# Patient Record
Sex: FEMALE | Race: WHITE | NOT HISPANIC OR LATINO | Employment: FULL TIME | URBAN - METROPOLITAN AREA
[De-identification: names, ages, dates, MRNs, and addresses within clinical notes are randomized per-mention and may not be internally consistent; named-entity substitution may affect disease eponyms.]

---

## 2015-09-16 LAB
HUMAN PAPILLOMAVIRUS (HPV): NORMAL
PAP SMEAR: NORMAL

## 2018-05-03 ENCOUNTER — OFFICE VISIT (OUTPATIENT)
Dept: CARDIOLOGY | Facility: CLINIC | Age: 28
End: 2018-05-03
Payer: COMMERCIAL

## 2018-05-03 VITALS
HEART RATE: 116 BPM | DIASTOLIC BLOOD PRESSURE: 69 MMHG | BODY MASS INDEX: 21.12 KG/M2 | HEIGHT: 63 IN | SYSTOLIC BLOOD PRESSURE: 119 MMHG | WEIGHT: 119.19 LBS

## 2018-05-03 DIAGNOSIS — R00.0 SINUS TACHYCARDIA: Primary | ICD-10-CM

## 2018-05-03 PROCEDURE — 99202 OFFICE O/P NEW SF 15 MIN: CPT | Mod: S$GLB,,, | Performed by: INTERNAL MEDICINE

## 2018-05-03 PROCEDURE — 93000 ELECTROCARDIOGRAM COMPLETE: CPT | Mod: S$GLB,,, | Performed by: INTERNAL MEDICINE

## 2018-05-03 PROCEDURE — 3008F BODY MASS INDEX DOCD: CPT | Mod: CPTII,S$GLB,, | Performed by: INTERNAL MEDICINE

## 2018-05-03 PROCEDURE — 99999 PR PBB SHADOW E&M-NEW PATIENT-LVL III: CPT | Mod: PBBFAC,,, | Performed by: INTERNAL MEDICINE

## 2018-05-03 RX ORDER — CYCLOBENZAPRINE HCL 10 MG
10 TABLET ORAL NIGHTLY
COMMUNITY
Start: 2018-04-24 | End: 2020-11-11

## 2018-05-03 RX ORDER — MELOXICAM 15 MG/1
15 TABLET ORAL DAILY PRN
COMMUNITY
End: 2020-11-11

## 2018-05-03 RX ORDER — MELATONIN 10 MG
CAPSULE ORAL
COMMUNITY
End: 2020-11-11

## 2018-05-03 NOTE — PROGRESS NOTES
"  Subjective:      Patient ID: Cheryl Peter is a 27 y.o. female.    Chief Complaint: Palpitations and Tachycardia    HPI: "I can feel a flutter in my chest and my heart pounding intermittently for the past month.  Pt is aware of palpitations mostly when sitting.  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  No exercise per se. Pt performs housework without difficulty.  After blowdrying hair last Saturday pt arose and felt weak with blurry vision transiently.  BP was 114/68 with heart rate 130 bpm.    Pt takes cyclobenzaprine for fibromyalgia.  Review of Systems   Cardiovascular: Positive for palpitations. Negative for chest pain, claudication, dyspnea on exertion, irregular heartbeat, leg swelling, near-syncope, orthopnea and syncope.        No past medical history on file.     Past Surgical History:   Procedure Laterality Date    ankle arthroscopy      DILATION AND CURETTAGE OF UTERUS      TONSILLECTOMY         Family History   Problem Relation Age of Onset    Diabetes Mother     Hyperlipidemia Mother     Heart disease Father     Hyperlipidemia Father     Diabetes Father        Social History     Social History    Marital status:      Spouse name: N/A    Number of children: N/A    Years of education: N/A     Social History Main Topics    Smoking status: Never Smoker    Smokeless tobacco: Never Used    Alcohol use No    Drug use: Unknown    Sexual activity: Not Asked     Other Topics Concern    None     Social History Narrative    None       No current outpatient prescriptions on file prior to visit.     No current facility-administered medications on file prior to visit.        Review of patient's allergies indicates:   Allergen Reactions    Penicillins Hives and Rash     Objective:     Vitals:    05/03/18 1605   BP: 119/69   Pulse: (!) 116   Weight: 54.1 kg (119 lb 3.2 oz)   Height: 5' 3" (1.6 m)        Physical Exam   Constitutional: She is oriented to person, " place, and time. She appears well-developed and well-nourished.   Eyes: No scleral icterus.   Neck: No JVD present. Carotid bruit is not present.   Cardiovascular: Normal rate and regular rhythm.  Exam reveals no gallop.    Murmur (I/VI systolic) heard.  Pulses:       Dorsalis pedis pulses are 2+ on the right side, and 2+ on the left side.   Pulmonary/Chest: Breath sounds normal.   Abdominal: Soft. There is no hepatosplenomegaly. There is no tenderness.   Musculoskeletal: She exhibits no edema.   Neurological: She is alert and oriented to person, place, and time.   Skin: Skin is warm and dry.   Psychiatric: She has a normal mood and affect. Her behavior is normal. Judgment and thought content normal.   Vitals reviewed.     ECG: sinus tachycardia, nonspecific ST segment and T wave abnormalities.    Log of heart rate and blood pressures reviewed from home  Note normal Hgb in January 2018  Note normal Tsh in December last year    Note BMP was normal last year      Assessment:     1. Sinus tachycardia      Plan:   Cheryl was seen today for palpitations and tachycardia.    Diagnoses and all orders for this visit:    Sinus tachycardia  -     2D echo with color flow doppler; Future  -     TSH; Future  -     CBC auto differential; Future  -     Lipid panel; Future  -     Comprehensive metabolic panel; Future  -     T4, free; Future  -     Holter monitor - 24 hour; Future  -     EKG 12-lead     I suspect that the sinus tachycardia is a side effect of cyclobenzaprine.     Will empircally reduce the dose to 10 mg q hs and pt will discuss with her rheumatologist alternative drugs.    Discussed usual w/u for sinus tachycardia:  TFT's   Echocardiogram  24 hour holter monitor   Repeat CBC  CMP  Lipids due to FH of hyperlipidemia    Pt will proceed with w/u if palpitations persist even after weaning the cyclobenzaprine    Follow-up in about 4 weeks (around 5/31/2018).

## 2018-09-21 ENCOUNTER — LAB VISIT (OUTPATIENT)
Dept: LAB | Facility: HOSPITAL | Age: 28
End: 2018-09-21
Attending: INTERNAL MEDICINE
Payer: COMMERCIAL

## 2018-09-21 DIAGNOSIS — D68.9 COAGULATION DISORDER: ICD-10-CM

## 2018-09-21 LAB
ALBUMIN SERPL BCP-MCNC: 4.9 G/DL
ALP SERPL-CCNC: 36 U/L
ALT SERPL W/O P-5'-P-CCNC: 25 U/L
ANION GAP SERPL CALC-SCNC: 12 MMOL/L
APTT PPP: 30.7 SEC
AST SERPL-CCNC: 23 U/L
BASOPHILS # BLD AUTO: 0.03 K/UL
BASOPHILS NFR BLD: 0.3 %
BILIRUB SERPL-MCNC: 0.4 MG/DL
BUN SERPL-MCNC: 12 MG/DL
CALCIUM SERPL-MCNC: 10.3 MG/DL
CHLORIDE SERPL-SCNC: 102 MMOL/L
CO2 SERPL-SCNC: 24 MMOL/L
CREAT SERPL-MCNC: 0.62 MG/DL
DIFFERENTIAL METHOD: ABNORMAL
EOSINOPHIL # BLD AUTO: 0 K/UL
EOSINOPHIL NFR BLD: 0.4 %
ERYTHROCYTE [DISTWIDTH] IN BLOOD BY AUTOMATED COUNT: 11.9 %
EST. GFR  (AFRICAN AMERICAN): >60 ML/MIN/1.73 M^2
EST. GFR  (NON AFRICAN AMERICAN): >60 ML/MIN/1.73 M^2
GLUCOSE SERPL-MCNC: 96 MG/DL
HCT VFR BLD AUTO: 41.8 %
HGB BLD-MCNC: 14 G/DL
INR PPP: 1
LYMPHOCYTES # BLD AUTO: 2.9 K/UL
LYMPHOCYTES NFR BLD: 33 %
MCH RBC QN AUTO: 31.3 PG
MCHC RBC AUTO-ENTMCNC: 33.5 G/DL
MCV RBC AUTO: 93 FL
MONOCYTES # BLD AUTO: 0.5 K/UL
MONOCYTES NFR BLD: 5.4 %
NEUTROPHILS # BLD AUTO: 5.4 K/UL
NEUTROPHILS NFR BLD: 60.9 %
NRBC BLD-RTO: 0 /100 WBC
PLATELET # BLD AUTO: 323 K/UL
PMV BLD AUTO: 8.7 FL
POTASSIUM SERPL-SCNC: 4.4 MMOL/L
PROT SERPL-MCNC: 8.2 G/DL
PROTHROMBIN TIME: 12.9 SEC
RBC # BLD AUTO: 4.48 M/UL
SODIUM SERPL-SCNC: 138 MMOL/L
WBC # BLD AUTO: 8.92 K/UL

## 2018-09-21 PROCEDURE — 80053 COMPREHEN METABOLIC PANEL: CPT

## 2018-09-21 PROCEDURE — 85610 PROTHROMBIN TIME: CPT

## 2018-09-21 PROCEDURE — 36415 COLL VENOUS BLD VENIPUNCTURE: CPT | Mod: PN

## 2018-09-21 PROCEDURE — 85610 PROTHROMBIN TIME: CPT | Mod: PN

## 2018-09-21 PROCEDURE — 80053 COMPREHEN METABOLIC PANEL: CPT | Mod: PN

## 2018-09-21 PROCEDURE — 85730 THROMBOPLASTIN TIME PARTIAL: CPT | Mod: PN

## 2018-09-21 PROCEDURE — 85025 COMPLETE CBC W/AUTO DIFF WBC: CPT

## 2018-09-21 PROCEDURE — 85025 COMPLETE CBC W/AUTO DIFF WBC: CPT | Mod: PN

## 2018-09-21 PROCEDURE — 85730 THROMBOPLASTIN TIME PARTIAL: CPT

## 2018-09-21 PROCEDURE — 86146 BETA-2 GLYCOPROTEIN ANTIBODY: CPT | Mod: 59

## 2018-09-27 LAB
B2 GLYCOPROT1 IGA SER QL: <9 SAU
B2 GLYCOPROT1 IGG SER QL: <9 SGU
B2 GLYCOPROT1 IGM SER QL: <9 SMU

## 2019-02-04 ENCOUNTER — TELEPHONE (OUTPATIENT)
Dept: OTOLARYNGOLOGY | Facility: CLINIC | Age: 29
End: 2019-02-04

## 2019-02-07 DIAGNOSIS — R42 DIZZY: Primary | ICD-10-CM

## 2019-02-07 NOTE — TELEPHONE ENCOUNTER
Pt reported vertigo and tinnitus.  Added BPPV eval after Audiogram and ENT visit tomorrow. Advised no Meclizine for 24 hours.  Pt's last dose was yesterday. Understanding voiced.

## 2019-02-08 ENCOUNTER — CLINICAL SUPPORT (OUTPATIENT)
Dept: AUDIOLOGY | Facility: CLINIC | Age: 29
End: 2019-02-08
Payer: COMMERCIAL

## 2019-02-08 ENCOUNTER — OFFICE VISIT (OUTPATIENT)
Dept: OTOLARYNGOLOGY | Facility: CLINIC | Age: 29
End: 2019-02-08
Payer: COMMERCIAL

## 2019-02-08 VITALS
SYSTOLIC BLOOD PRESSURE: 119 MMHG | BODY MASS INDEX: 21.54 KG/M2 | DIASTOLIC BLOOD PRESSURE: 69 MMHG | WEIGHT: 117.06 LBS | HEIGHT: 62 IN | HEART RATE: 82 BPM

## 2019-02-08 DIAGNOSIS — H81.11 BPPV (BENIGN PAROXYSMAL POSITIONAL VERTIGO), RIGHT: Primary | ICD-10-CM

## 2019-02-08 DIAGNOSIS — H81.01 ENDOLYMPHATIC HYDROPS OF RIGHT EAR: Primary | ICD-10-CM

## 2019-02-08 DIAGNOSIS — H90.41 SENSORINEURAL HEARING LOSS (SNHL) OF RIGHT EAR WITH UNRESTRICTED HEARING OF LEFT EAR: Primary | ICD-10-CM

## 2019-02-08 DIAGNOSIS — H93.11 TINNITUS, RIGHT: ICD-10-CM

## 2019-02-08 PROCEDURE — 92541 SPONTANEOUS NYSTAGMUS TEST: CPT | Mod: S$GLB,,, | Performed by: AUDIOLOGIST

## 2019-02-08 PROCEDURE — 99203 PR OFFICE/OUTPT VISIT, NEW, LEVL III, 30-44 MIN: ICD-10-PCS | Mod: S$GLB,,, | Performed by: NURSE PRACTITIONER

## 2019-02-08 PROCEDURE — 92567 TYMPANOMETRY: CPT | Mod: S$GLB,,, | Performed by: AUDIOLOGIST-HEARING AID FITTER

## 2019-02-08 PROCEDURE — 3008F PR BODY MASS INDEX (BMI) DOCUMENTED: ICD-10-PCS | Mod: CPTII,S$GLB,, | Performed by: NURSE PRACTITIONER

## 2019-02-08 PROCEDURE — 99999 PR PBB SHADOW E&M-EST. PATIENT-LVL III: ICD-10-PCS | Mod: PBBFAC,,, | Performed by: NURSE PRACTITIONER

## 2019-02-08 PROCEDURE — 3008F BODY MASS INDEX DOCD: CPT | Mod: CPTII,S$GLB,, | Performed by: NURSE PRACTITIONER

## 2019-02-08 PROCEDURE — 92557 PR COMPREHENSIVE HEARING TEST: ICD-10-PCS | Mod: S$GLB,,, | Performed by: AUDIOLOGIST-HEARING AID FITTER

## 2019-02-08 PROCEDURE — 99999 PR PBB SHADOW E&M-EST. PATIENT-LVL I: CPT | Mod: PBBFAC,,,

## 2019-02-08 PROCEDURE — 92541 PR SPONTANEOUS NYSTAGMUS TEST: ICD-10-PCS | Mod: S$GLB,,, | Performed by: AUDIOLOGIST

## 2019-02-08 PROCEDURE — 99203 OFFICE O/P NEW LOW 30 MIN: CPT | Mod: S$GLB,,, | Performed by: NURSE PRACTITIONER

## 2019-02-08 PROCEDURE — 92542 PR POSITIONAL NYSTAGMUS TEST: ICD-10-PCS | Mod: 59,S$GLB,, | Performed by: AUDIOLOGIST

## 2019-02-08 PROCEDURE — 92557 COMPREHENSIVE HEARING TEST: CPT | Mod: S$GLB,,, | Performed by: AUDIOLOGIST-HEARING AID FITTER

## 2019-02-08 PROCEDURE — 99999 PR PBB SHADOW E&M-EST. PATIENT-LVL I: ICD-10-PCS | Mod: PBBFAC,,,

## 2019-02-08 PROCEDURE — 99999 PR PBB SHADOW E&M-EST. PATIENT-LVL III: CPT | Mod: PBBFAC,,, | Performed by: NURSE PRACTITIONER

## 2019-02-08 PROCEDURE — 92567 PR TYMPA2METRY: ICD-10-PCS | Mod: S$GLB,,, | Performed by: AUDIOLOGIST-HEARING AID FITTER

## 2019-02-08 PROCEDURE — 92542 POSITIONAL NYSTAGMUS TEST: CPT | Mod: 59,S$GLB,, | Performed by: AUDIOLOGIST

## 2019-02-08 RX ORDER — TRIAMTERENE AND HYDROCHLOROTHIAZIDE 37.5; 25 MG/1; MG/1
1 CAPSULE ORAL EVERY MORNING
Qty: 30 CAPSULE | Refills: 2 | Status: SHIPPED | OUTPATIENT
Start: 2019-02-08 | End: 2019-02-08

## 2019-02-08 RX ORDER — METHYLPREDNISOLONE 4 MG/1
TABLET ORAL
Qty: 21 TABLET | Refills: 0 | Status: SHIPPED | OUTPATIENT
Start: 2019-02-08 | End: 2019-02-08

## 2019-02-08 RX ORDER — PROGESTERONE 200 MG/1
CAPSULE ORAL
Refills: 5 | COMMUNITY
Start: 2018-12-04 | End: 2020-11-11

## 2019-02-08 NOTE — PROGRESS NOTES
Cheryl Peter was seen 02/08/2019 for an audiological evaluation. Patient complains of dizziness. Pt reports dizziness began Saturday morning following a concert the previous night. She also endorses muffled hearing, aural fullness and loud tinnitus in her right ear as well. She became nauseous and vomited. She went to the ER where she was given Meclizine.  Her tinnitus has since changed to roaring and the dizziness has changed to more lightheadedness rather than spinning. She is being evaluated for dizziness but Lakisha Parker     Results reveal normal hearing from 250-8000Hz bilaterally with the exception of a mild SNHL at 8K Hz for the right ear.    Speech Reception Thresholds were  15 dBHL for the right ear and 15 dBHL for the left ear.    Word recognition scores were excellent bilaterally.   Tympanograms were Type A for the right ear and Type A for the left ear.    Audiogram results were reviewed in detail with patient and all questions were answered. Results will be reviewed by ENT at the completion of this note. Recommend further balance eval for the lightheadedness, repeat hearing test if problems arise and hearing protection in loud noise.

## 2019-02-08 NOTE — PROGRESS NOTES
Cheryl Peter was seen 2/8/19 for a BPPV evaluation.    Patient reported that she went to a concert on Friday and did consume some alcohol.  She woke up Saturday morning with vertigo, along with aural fullness, muffled hearing and tinnitus in her right ear.  She began to have nausea and vomiting as the day progressed.  Her dizziness occurred whenever she tried to get up but improved with laying down.  Her vertigo continued from Saturday through Tuesday and then gradually progressed to more of a lightheaded feeling as opposed to a true spinning. Her tinnitus began to change       VAT was negative right and negative left   Gaze nystagmus was absent  Spontaneous nystagmus absent  Head Right Positional was negative but yielded 2 d/s RB nystagmus   Head Left Positional was negative but yielded 4 d/s LB nystagmus   Hallpike Right was positive for BPPV - 5 d/s RB nystagmus with 5 d/s DB nystagmus; torsional with fixation  Hallpike Left was negative for BPPV but yielded 4 d/s LB nystagmus     A 5-position Epley maneuver was performed for the right ear.  Patient tolerated the maneuver well and was asymptomatic upon discharge.  Post maneuver instructions were given to the patient both verbally and written.  Understanding was voiced.    A follow-up appointment (Lydia) has been scheduled for one week to ensure that BPPV has resolved.    Diagnosis:  PSCC BPPV right, likely secondary to other inner ear pathology given additional aural symptoms that began at the same time    Will possibly need VNG once BPPV is cleared.

## 2019-02-08 NOTE — PROGRESS NOTES
Subjective:       Patient ID: Cheryl Peter is a 28 y.o. female.    Chief Complaint: No chief complaint on file.    HPI   Patient had some alcohol with dinner Friday night (2/1/19). Woke up Saturday (2/2/19) with right ear fullness, right loud ringing tinnitus, right-sided hearing loss, then developed non-positional vertigo that gradually worsened throughout the morning. She had emesis X 2 from vertigo; went to Urgent Care where she was given meclizine. She stayed in bed Saturday through Tuesday (2/5/19), because any movement was not tolerated due to vertigo. Wednesday she felt slightly better with transition from spinning to lightheaded, able to move around for the first time since it started, then yesterday and today just a little lightheaded.   No family h/o Meniere's. She had a beer and a radha Friday night, ate mexican so likely high sodium intake. States she is probably frequently dehydrated.   She and her  are trying to conceive so she is leery to start any new medication at this time (she has experienced three miscarriages).     Review of Systems    Objective:      Physical Exam   Constitutional: She is oriented to person, place, and time. Vital signs are normal. She appears well-developed and well-nourished. She is cooperative. She does not appear ill. No distress.   HENT:   Head: Normocephalic and atraumatic.   Right Ear: Hearing, tympanic membrane, external ear and ear canal normal. Tympanic membrane is not erythematous. No middle ear effusion.   Left Ear: Hearing, tympanic membrane, external ear and ear canal normal. Tympanic membrane is not erythematous.  No middle ear effusion.   Nose: Nose normal. No mucosal edema or rhinorrhea. Right sinus exhibits no maxillary sinus tenderness and no frontal sinus tenderness. Left sinus exhibits no maxillary sinus tenderness and no frontal sinus tenderness.   Mouth/Throat: Uvula is midline, oropharynx is clear and moist and mucous membranes are  normal. Mucous membranes are not pale, not dry and not cyanotic. No oral lesions. No oropharyngeal exudate, posterior oropharyngeal edema or posterior oropharyngeal erythema.   Eyes: Conjunctivae, EOM and lids are normal. Pupils are equal, round, and reactive to light. Right eye exhibits no discharge. Left eye exhibits no discharge. No scleral icterus.   Neck: Trachea normal and normal range of motion. Neck supple. No tracheal deviation present. No thyroid mass and no thyromegaly present.   Cardiovascular: Normal rate.   Pulmonary/Chest: Effort normal. No stridor. No respiratory distress. She has no wheezes.   Musculoskeletal: Normal range of motion.   Lymphadenopathy:        Head (right side): No submental, no submandibular, no tonsillar, no preauricular and no posterior auricular adenopathy present.        Head (left side): No submental, no submandibular, no tonsillar, no preauricular and no posterior auricular adenopathy present.     She has no cervical adenopathy.        Right cervical: No superficial cervical and no posterior cervical adenopathy present.       Left cervical: No superficial cervical and no posterior cervical adenopathy present.   Neurological: She is alert and oriented to person, place, and time. She has normal strength. Coordination and gait normal.   Skin: Skin is warm, dry and intact. No lesion and no rash noted. She is not diaphoretic. No cyanosis. No pallor.   Psychiatric: She has a normal mood and affect. Her speech is normal and behavior is normal. Judgment and thought content normal. Cognition and memory are normal.   Nursing note and vitals reviewed.       Assessment:     She reports all four components consistent with Meniere's: right aural fullness, right tinnitus, right hearing loss, and non-positional vertigo.   Audiogram today is not clearly indicative of Meniere's (no significant low-frequency asymmetry); however her symptoms have significantly improved over the last three days.  It would have been interesting to see what her audiogram may have looked like 5-6 days ago.      Plan:     Patient is trying to conceive. Dyazide is pregnancy category C. So is methylprednisolone. Her symptoms have significantly improved over the past 3 days, so she is inclined to wait and see if improvement continues. If not, and her upcoming pregnancy test next week is negative, she may choose to start Dyazide for three weeks.     She is encouraged to call me and let me know what she decides to do.

## 2019-02-11 ENCOUNTER — PATIENT MESSAGE (OUTPATIENT)
Dept: OTOLARYNGOLOGY | Facility: CLINIC | Age: 29
End: 2019-02-11

## 2019-02-11 DIAGNOSIS — R42 DIZZY: Primary | ICD-10-CM

## 2019-02-15 ENCOUNTER — CLINICAL SUPPORT (OUTPATIENT)
Dept: AUDIOLOGY | Facility: CLINIC | Age: 29
End: 2019-02-15
Payer: COMMERCIAL

## 2019-02-15 DIAGNOSIS — H81.11 BPPV (BENIGN PAROXYSMAL POSITIONAL VERTIGO), RIGHT: Primary | ICD-10-CM

## 2019-02-15 PROCEDURE — 92542 PR POSITIONAL NYSTAGMUS TEST: ICD-10-PCS | Mod: S$GLB,,, | Performed by: AUDIOLOGIST

## 2019-02-15 PROCEDURE — 92542 POSITIONAL NYSTAGMUS TEST: CPT | Mod: S$GLB,,, | Performed by: AUDIOLOGIST

## 2019-02-15 NOTE — PROGRESS NOTES
Cheryl Peter was seen 2/15/19 for a BPPV evaluation.     Patient reported no vertigo for several days.  She has been able to move in ways that previously triggered her vertigo.  She stated that her right ear is starting to feel better (decrease in degree of aural fullness).  She is not taking any meds for this because she recently found out she is pregnant.       VAT was negative right and negative left   Head Right Positional was negative  Head Left Positional was negative for BPPV but yielded 3 d/s RB nystagmus (clinically insignificant with pt reporting no vertigo)  Hallpike Right was negative for torsional nystagmus indicative of typical BPPV but yielded 5 d/s DB nystagmus with some vertigo reported  Hallpike Left was negative for BPPV but yielded 3 d/s RB nystagmus (clinically insignificant with pt reporting no vertigo)    A 5-position Epley maneuver was performed for the right ear.  Patient tolerated the maneuver well and was asymptomatic upon discharge.  Post maneuver instructions were given to the patient both verbally and written.  Understanding was voiced.    Pt will continue with movement restrictions through the weekend and call if her symptoms return as she begins to return to normal activities next week.     Impression: BPPV AD almost fully resolved

## 2020-06-05 ENCOUNTER — OFFICE VISIT (OUTPATIENT)
Dept: PODIATRY | Facility: CLINIC | Age: 30
End: 2020-06-05
Payer: COMMERCIAL

## 2020-06-05 VITALS
WEIGHT: 117 LBS | HEIGHT: 62 IN | BODY MASS INDEX: 21.53 KG/M2 | DIASTOLIC BLOOD PRESSURE: 65 MMHG | HEART RATE: 91 BPM | SYSTOLIC BLOOD PRESSURE: 106 MMHG

## 2020-06-05 DIAGNOSIS — L60.3 NAIL DYSTROPHY: Primary | ICD-10-CM

## 2020-06-05 PROCEDURE — 99203 PR OFFICE/OUTPT VISIT, NEW, LEVL III, 30-44 MIN: ICD-10-PCS | Mod: S$GLB,,, | Performed by: PODIATRIST

## 2020-06-05 PROCEDURE — 88312 PR  SPECIAL STAINS,GROUP I: ICD-10-PCS | Mod: 26,,, | Performed by: PATHOLOGY

## 2020-06-05 PROCEDURE — 99203 OFFICE O/P NEW LOW 30 MIN: CPT | Mod: S$GLB,,, | Performed by: PODIATRIST

## 2020-06-05 PROCEDURE — 88312 SPECIAL STAINS GROUP 1: CPT | Mod: 26,,, | Performed by: PATHOLOGY

## 2020-06-05 PROCEDURE — 88302 TISSUE EXAM BY PATHOLOGIST: CPT | Mod: 26,,, | Performed by: PATHOLOGY

## 2020-06-05 PROCEDURE — 99999 PR PBB SHADOW E&M-EST. PATIENT-LVL III: CPT | Mod: PBBFAC,,, | Performed by: PODIATRIST

## 2020-06-05 PROCEDURE — 88312 SPECIAL STAINS GROUP 1: CPT | Performed by: PATHOLOGY

## 2020-06-05 PROCEDURE — 3008F BODY MASS INDEX DOCD: CPT | Mod: CPTII,S$GLB,, | Performed by: PODIATRIST

## 2020-06-05 PROCEDURE — 88302 PR  SURG PATH,LEVEL II: ICD-10-PCS | Mod: 26,,, | Performed by: PATHOLOGY

## 2020-06-05 PROCEDURE — 88302 TISSUE EXAM BY PATHOLOGIST: CPT | Performed by: PATHOLOGY

## 2020-06-05 PROCEDURE — 3008F PR BODY MASS INDEX (BMI) DOCUMENTED: ICD-10-PCS | Mod: CPTII,S$GLB,, | Performed by: PODIATRIST

## 2020-06-05 PROCEDURE — 99999 PR PBB SHADOW E&M-EST. PATIENT-LVL III: ICD-10-PCS | Mod: PBBFAC,,, | Performed by: PODIATRIST

## 2020-06-05 NOTE — PROGRESS NOTES
Subjective:      Patient ID: Cheryl Peter is a 29 y.o. female.    Chief Complaint: Nail Problem (assessment for fungus 2nd lesser toe )  Patient presents to clinic with the chief complaint of a fungal infection of the Lt. 2nd toe that has been present for the past year.  Relates first noticing nail changes while pregnant which failed to resolve thereafter.  Notes having difficulty trimming the nail and is concerned by its unsightly appearance.  Has attempted to self treat with application of topical OTC antifungals.  States these failed to improve her issue.  Inquires as to additional treatment options.  Denies any additional pedal complaints.  No past medical history on file.    Past Surgical History:   Procedure Laterality Date    ankle arthroscopy      DILATION AND CURETTAGE OF UTERUS      TONSILLECTOMY         Family History   Problem Relation Age of Onset    Diabetes Mother     Hyperlipidemia Mother     Heart disease Father     Hyperlipidemia Father     Diabetes Father        Social History     Socioeconomic History    Marital status:      Spouse name: Not on file    Number of children: Not on file    Years of education: Not on file    Highest education level: Not on file   Occupational History    Not on file   Social Needs    Financial resource strain: Not on file    Food insecurity:     Worry: Not on file     Inability: Not on file    Transportation needs:     Medical: Not on file     Non-medical: Not on file   Tobacco Use    Smoking status: Never Smoker    Smokeless tobacco: Never Used   Substance and Sexual Activity    Alcohol use: No    Drug use: Not on file    Sexual activity: Not on file   Lifestyle    Physical activity:     Days per week: Not on file     Minutes per session: Not on file    Stress: Not on file   Relationships    Social connections:     Talks on phone: Not on file     Gets together: Not on file     Attends Zoroastrianism service: Not on file     Active  member of club or organization: Not on file     Attends meetings of clubs or organizations: Not on file     Relationship status: Not on file   Other Topics Concern    Not on file   Social History Narrative    Not on file       Current Outpatient Medications   Medication Sig Dispense Refill    cyclobenzaprine (FLEXERIL) 10 MG tablet Take 10 mg by mouth every evening.      diphenhydramine HCl (UNISOM SLEEPGELS ORAL) Take by mouth.      melatonin 10 mg Cap Take by mouth.      meloxicam (MOBIC) 15 MG tablet Take 15 mg by mouth daily as needed for Pain.      PNV 79-iron-folic-lmfolate-dha 27 mg-400 mcg- 1.13 mg-250 mg Cap Take 1 capsule by mouth once daily. 90 capsule 6    progesterone (PROMETRIUM) 200 MG capsule PLACE 1 CAPSULE IN VAGINA AT BEDTIME AS DIRECTED  5    promethazine (PHENERGAN) 25 MG tablet Take 25 mg by mouth every 4 (four) hours.       No current facility-administered medications for this visit.        Review of patient's allergies indicates:   Allergen Reactions    Penicillins Hives and Rash       Review of Systems   Constitution: Negative for chills and fever.   Cardiovascular: Negative for claudication and leg swelling.   Skin: Positive for color change and nail changes.   Musculoskeletal: Negative for joint pain, joint swelling, muscle cramps, muscle weakness and myalgias.   Gastrointestinal: Negative for nausea and vomiting.   Psychiatric/Behavioral: Negative for altered mental status.           Objective:      Physical Exam   Constitutional: She is oriented to person, place, and time. She appears well-developed and well-nourished. No distress.   Cardiovascular:   Pulses:       Dorsalis pedis pulses are 2+ on the right side, and 2+ on the left side.        Posterior tibial pulses are 2+ on the right side, and 2+ on the left side.   CFT is < 3 seconds bilateral.  Pedal hair growth is present bilateral.  No lower extremity edema noted bilateral.  Toes are warm to touch bilateral.      Musculoskeletal: She exhibits no edema or tenderness.   Muscle strength 5/5 in all muscle groups bilateral.  No tenderness nor crepitation with ROM of foot/ankle joints bilateral.  No tenderness with palpation of bilateral foot and ankle.  Bilateral pes planus foot type.   Neurological: She is alert and oriented to person, place, and time. She has normal strength. No sensory deficit.   Light touch intact bilateral.   Skin: Skin is warm, dry and intact. Capillary refill takes 2 to 3 seconds. No abrasion, no bruising, no burn, no ecchymosis, no laceration, no lesion and no rash noted. She is not diaphoretic. No erythema. No pallor.   Pedal skin has normal turgor, temperature, and texture bilateral.  Remaining toenails x 9 appear normotrophic.  The Lt. 2nd toenail appears thickened, dystrophic, and shows evidence of subungual debris.  Examination of the skin reveals no evidence of significant maceration, rashes, open lesions, suspicious appearing nevi or other concerning lesions.              Assessment:       Encounter Diagnosis   Name Primary?    Nail dystrophy Yes         Plan:       Cheryl was seen today for nail problem.    Diagnoses and all orders for this visit:    Nail dystrophy  -     Specimen to Pathology Other      I counseled the patient on her conditions, their implications and medical management.    With the patient's verbal consent, a sterile nail nipper was used to trim a portion of the Lt. 2nd toenail without incident.  This was well tolerated by the patient.  The specimen was sent to Pathology for assessment of possible fungal infection.    Discussed with patient a variety of treatment options to address onychomycosis including Maurice Vaporub, topical/oral antifungals, and laser therapy.    Advised to regularly clean shoe gear and shower surfaces to limit exposure.    Advised to be wary of walking barefoot on carpeted surfaces at gyms and hotel rooms.  Also, avoid barefoot walking at public showers  and pool areas.    RTC prn.    Fazal Haley DPM

## 2020-06-11 LAB
FINAL PATHOLOGIC DIAGNOSIS: NORMAL
GROSS: NORMAL

## 2020-06-12 ENCOUNTER — TELEPHONE (OUTPATIENT)
Dept: PODIATRY | Facility: CLINIC | Age: 30
End: 2020-06-12

## 2020-06-12 NOTE — TELEPHONE ENCOUNTER
----- Message from Siomara Rosenbaum LPN sent at 6/12/2020 11:05 AM CDT -----  2nd attempt called no answer. Could not leave message

## 2020-08-18 DIAGNOSIS — M25.512 LEFT SHOULDER PAIN, UNSPECIFIED CHRONICITY: Primary | ICD-10-CM

## 2020-08-20 ENCOUNTER — OFFICE VISIT (OUTPATIENT)
Dept: ORTHOPEDICS | Facility: CLINIC | Age: 30
End: 2020-08-20
Payer: COMMERCIAL

## 2020-08-20 ENCOUNTER — HOSPITAL ENCOUNTER (OUTPATIENT)
Dept: RADIOLOGY | Facility: HOSPITAL | Age: 30
Discharge: HOME | End: 2020-08-20
Attending: ORTHOPAEDIC SURGERY
Payer: COMMERCIAL

## 2020-08-20 VITALS — BODY MASS INDEX: 21.53 KG/M2 | RESPIRATION RATE: 16 BRPM | HEIGHT: 62 IN | WEIGHT: 117 LBS

## 2020-08-20 DIAGNOSIS — M25.512 LEFT SHOULDER PAIN, UNSPECIFIED CHRONICITY: ICD-10-CM

## 2020-08-20 DIAGNOSIS — M25.512 LEFT SHOULDER PAIN, UNSPECIFIED CHRONICITY: Primary | ICD-10-CM

## 2020-08-20 DIAGNOSIS — M89.8X1 PAIN OF LEFT SCAPULA: Primary | ICD-10-CM

## 2020-08-20 PROCEDURE — 99203 OFFICE O/P NEW LOW 30 MIN: CPT | Mod: S$GLB,,, | Performed by: ORTHOPAEDIC SURGERY

## 2020-08-20 PROCEDURE — 73030 XR SHOULDER TRAUMA 3 VIEW LEFT: ICD-10-PCS | Mod: 26,LT,, | Performed by: RADIOLOGY

## 2020-08-20 PROCEDURE — 3008F PR BODY MASS INDEX (BMI) DOCUMENTED: ICD-10-PCS | Mod: CPTII,S$GLB,, | Performed by: ORTHOPAEDIC SURGERY

## 2020-08-20 PROCEDURE — 99999 PR PBB SHADOW E&M-EST. PATIENT-LVL III: ICD-10-PCS | Mod: PBBFAC,,, | Performed by: ORTHOPAEDIC SURGERY

## 2020-08-20 PROCEDURE — 99203 PR OFFICE/OUTPT VISIT, NEW, LEVL III, 30-44 MIN: ICD-10-PCS | Mod: S$GLB,,, | Performed by: ORTHOPAEDIC SURGERY

## 2020-08-20 PROCEDURE — 73030 X-RAY EXAM OF SHOULDER: CPT | Mod: TC,PN,LT

## 2020-08-20 PROCEDURE — 73030 X-RAY EXAM OF SHOULDER: CPT | Mod: 26,LT,, | Performed by: RADIOLOGY

## 2020-08-20 PROCEDURE — 99999 PR PBB SHADOW E&M-EST. PATIENT-LVL III: CPT | Mod: PBBFAC,,, | Performed by: ORTHOPAEDIC SURGERY

## 2020-08-20 PROCEDURE — 3008F BODY MASS INDEX DOCD: CPT | Mod: CPTII,S$GLB,, | Performed by: ORTHOPAEDIC SURGERY

## 2020-08-20 NOTE — PROGRESS NOTES
History reviewed. No pertinent past medical history.    Past Surgical History:   Procedure Laterality Date    ankle arthroscopy      DILATION AND CURETTAGE OF UTERUS      TONSILLECTOMY         Current Outpatient Medications   Medication Sig    cyclobenzaprine (FLEXERIL) 10 MG tablet Take 10 mg by mouth every evening.    diphenhydramine HCl (UNISOM SLEEPGELS ORAL) Take by mouth.    melatonin 10 mg Cap Take by mouth.    meloxicam (MOBIC) 15 MG tablet Take 15 mg by mouth daily as needed for Pain.    PNV 79-iron-folic-lmfolate-dha 27 mg-400 mcg- 1.13 mg-250 mg Cap Take 1 capsule by mouth once daily.    progesterone (PROMETRIUM) 200 MG capsule PLACE 1 CAPSULE IN VAGINA AT BEDTIME AS DIRECTED    promethazine (PHENERGAN) 25 MG tablet Take 25 mg by mouth every 4 (four) hours.     No current facility-administered medications for this visit.        Review of patient's allergies indicates:   Allergen Reactions    Penicillins Hives and Rash       Family History   Problem Relation Age of Onset    Diabetes Mother     Hyperlipidemia Mother     Heart disease Father     Hyperlipidemia Father     Diabetes Father        Social History     Socioeconomic History    Marital status:      Spouse name: Not on file    Number of children: Not on file    Years of education: Not on file    Highest education level: Not on file   Occupational History    Not on file   Social Needs    Financial resource strain: Not on file    Food insecurity     Worry: Not on file     Inability: Not on file    Transportation needs     Medical: Not on file     Non-medical: Not on file   Tobacco Use    Smoking status: Never Smoker    Smokeless tobacco: Never Used   Substance and Sexual Activity    Alcohol use: No    Drug use: Not on file    Sexual activity: Not on file   Lifestyle    Physical activity     Days per week: Not on file     Minutes per session: Not on file    Stress: Not on file   Relationships    Social connections      Talks on phone: Not on file     Gets together: Not on file     Attends [employer] service: Not on file     Active member of club or organization: Not on file     Attends meetings of clubs or organizations: Not on file     Relationship status: Not on file   Other Topics Concern    Not on file   Social History Narrative    Not on file       Chief Complaint:   Chief Complaint   Patient presents with    Left Shoulder - Pain, Initial Assessment, Initial Visit       History of present illness:  This is a 30-year-old right-hand-dominant female seen for left scapular pain.  Patient states that she has had some symptoms similar for years but gotten worse over the last month.  No injury or trauma.  It is worse when sitting at a keyboard or lifting her toddler up.  Pain is a 3/10.  Describes a numbness and tingling around the medial shoulder blade.      Answers for HPI/ROS submitted by the patient on 8/13/2020   Arm pain  unexpected weight change: No  appetite change : No  sleep disturbance: Yes  IMMUNOCOMPROMISED: No  nervous/ anxious: No  dysphoric mood: No  rash: No  visual disturbance: No  eye redness: No  eye pain: No  ear pain: No  tinnitus: No  hearing loss: No  sinus pressure : No  nosebleeds: No  enviro allergies: No  food allergies: No  cough: No  shortness of breath: No  sweating: No  dysuria: No  frequency: No  difficulty urinating: No  hematuria: No  painful intercourse: No  chest pain: No  palpitations: No  nausea: No  vomiting: No  diarrhea: No  blood in stool: No  constipation: No  headaches: No  dizziness: No  numbness: No  seizures: No  joint swelling: No  myalgia: No  weakness: No  back pain: No  Pain Chronicity: new  History of trauma: Yes  Onset: more than 1 month ago  Frequency: daily  Progression since onset: gradually worsening  injury location: at home  pain- numeric: 0/10  pain location: left shoulder, left elbow  pain quality: aching, dull, tingling  Radiating Pain: No  Aggravating factors:  flexion, lifting  fever: No  inability to bear weight: No  itching: No  joint locking: No  limited range of motion: No  stiffness: No  tingling: Yes  Treatments tried: brace/corset, exercise, movement, NSAIDs, OTC pain meds, rest, other  physical therapy: not tried  Improvement on treatment: no relief      Physical Examination:    Vital Signs:    Vitals:    08/20/20 0806   Resp: 16       Body mass index is 21.4 kg/m².    This a well-developed, well nourished patient in no acute distress.  They are alert and oriented and cooperative to examination.  Pt. walks without an antalgic gait.      Examination of the left shoulder shows no rashes or erythema. There are no masses, ecchymosis, or atrophy. The patient has full range of motion in forward flexion, external rotation, and internal rotation to the mid T-spine. The patient has - impingement signs. - Carroll's test. - Speeds test. Nontender to palpation over a.c. joint. Normal stability anteriorly, posteriorly, and negative sulcus sign. Passive range of motion: Forward flexion of 180°, external rotation at 90° of 90°, internal rotation of 50°, and external rotation at 0° of 50°. 2+ radial pulse. Intact axillary, radial, median and ulnar sensation. 5 out of 5 resisted forward flexion, external rotation, and negative lift off test.  Some slight scapular protraction    Examination of the right shoulder shows no rashes or erythema. There are no masses, ecchymosis, or atrophy. The patient has full range of motion in forward flexion, external rotation, and internal rotation to the mid T-spine. The patient has - impingement signs. - Carroll's test. - Speeds test. Nontender to palpation over a.c. joint. Normal stability anteriorly, posteriorly, and negative sulcus sign. Passive range of motion: Forward flexion of 180°, external rotation at 90° of 90°, internal rotation of 50°, and external rotation at 0° of 50°. 2+ radial pulse. Intact axillary, radial, median and ulnar  sensation. 5 out of 5 resisted forward flexion, external rotation, and negative lift off test.        X-rays:  X-rays of the left shoulder ordered and reviewed which show normal left shoulder     Assessment::  Left scapulothoracic pain    Plan:  Reviewed the findings with her today.  Recommended formal physical therapy for some scapular work.  Follow-up in 6 weeks.    This note was created using [employer] voice recognition software that occasionally misinterpreted phrases or words.    Consult note is delivered via Epic messaging service.

## 2020-09-30 ENCOUNTER — OFFICE VISIT (OUTPATIENT)
Dept: ORTHOPEDICS | Facility: CLINIC | Age: 30
End: 2020-09-30
Payer: COMMERCIAL

## 2020-09-30 VITALS — WEIGHT: 117 LBS | RESPIRATION RATE: 16 BRPM | HEIGHT: 62 IN | BODY MASS INDEX: 21.53 KG/M2

## 2020-09-30 DIAGNOSIS — M89.8X1 PAIN OF LEFT SCAPULA: Primary | ICD-10-CM

## 2020-09-30 PROCEDURE — 99999 PR PBB SHADOW E&M-EST. PATIENT-LVL III: ICD-10-PCS | Mod: PBBFAC,,, | Performed by: ORTHOPAEDIC SURGERY

## 2020-09-30 PROCEDURE — 99212 OFFICE O/P EST SF 10 MIN: CPT | Mod: S$GLB,,, | Performed by: ORTHOPAEDIC SURGERY

## 2020-09-30 PROCEDURE — 99212 PR OFFICE/OUTPT VISIT, EST, LEVL II, 10-19 MIN: ICD-10-PCS | Mod: S$GLB,,, | Performed by: ORTHOPAEDIC SURGERY

## 2020-09-30 PROCEDURE — 3008F PR BODY MASS INDEX (BMI) DOCUMENTED: ICD-10-PCS | Mod: CPTII,S$GLB,, | Performed by: ORTHOPAEDIC SURGERY

## 2020-09-30 PROCEDURE — 99999 PR PBB SHADOW E&M-EST. PATIENT-LVL III: CPT | Mod: PBBFAC,,, | Performed by: ORTHOPAEDIC SURGERY

## 2020-09-30 PROCEDURE — 3008F BODY MASS INDEX DOCD: CPT | Mod: CPTII,S$GLB,, | Performed by: ORTHOPAEDIC SURGERY

## 2020-09-30 NOTE — PROGRESS NOTES
History reviewed. No pertinent past medical history.    Past Surgical History:   Procedure Laterality Date    ankle arthroscopy      DILATION AND CURETTAGE OF UTERUS      TONSILLECTOMY         Current Outpatient Medications   Medication Sig    cyclobenzaprine (FLEXERIL) 10 MG tablet Take 10 mg by mouth every evening.    diphenhydramine HCl (UNISOM SLEEPGELS ORAL) Take by mouth.    melatonin 10 mg Cap Take by mouth.    meloxicam (MOBIC) 15 MG tablet Take 15 mg by mouth daily as needed for Pain.    PNV 79-iron-folic-lmfolate-dha 27 mg-400 mcg- 1.13 mg-250 mg Cap Take 1 capsule by mouth once daily.    progesterone (PROMETRIUM) 200 MG capsule PLACE 1 CAPSULE IN VAGINA AT BEDTIME AS DIRECTED    promethazine (PHENERGAN) 25 MG tablet Take 25 mg by mouth every 4 (four) hours.     No current facility-administered medications for this visit.        Review of patient's allergies indicates:   Allergen Reactions    Penicillins Hives and Rash       Family History   Problem Relation Age of Onset    Diabetes Mother     Hyperlipidemia Mother     Heart disease Father     Hyperlipidemia Father     Diabetes Father        Social History     Socioeconomic History    Marital status:      Spouse name: Not on file    Number of children: Not on file    Years of education: Not on file    Highest education level: Not on file   Occupational History    Not on file   Social Needs    Financial resource strain: Not on file    Food insecurity     Worry: Not on file     Inability: Not on file    Transportation needs     Medical: Not on file     Non-medical: Not on file   Tobacco Use    Smoking status: Never Smoker    Smokeless tobacco: Never Used   Substance and Sexual Activity    Alcohol use: No    Drug use: Not on file    Sexual activity: Not on file   Lifestyle    Physical activity     Days per week: Not on file     Minutes per session: Not on file    Stress: Not on file   Relationships    Social connections      Talks on phone: Not on file     Gets together: Not on file     Attends Latter-day service: Not on file     Active member of club or organization: Not on file     Attends meetings of clubs or organizations: Not on file     Relationship status: Not on file   Other Topics Concern    Not on file   Social History Narrative    Not on file       Chief Complaint:   Chief Complaint   Patient presents with    Shoulder Pain     left shoulder pain-6 week follow up post PT       History of present illness:  This is a 30-year-old right-hand-dominant female seen for left scapular pain.  Patient states that she has had some symptoms similar for years but gotten worse over the last month.  No injury or trauma.  It is worse when sitting at a keyboard or lifting her toddler up.  Pain is a 3/10.  We start her on some physical therapy in the numbness and tingling around the shoulder has gotten better.  Just more sore and her postural issues have not fully resolved but are improving.      Answers for HPI/ROS submitted by the patient on 8/13/2020   Arm pain  unexpected weight change: No  appetite change : No  sleep disturbance: Yes  IMMUNOCOMPROMISED: No  nervous/ anxious: No  dysphoric mood: No  rash: No  visual disturbance: No  eye redness: No  eye pain: No  ear pain: No  tinnitus: No  hearing loss: No  sinus pressure : No  nosebleeds: No  enviro allergies: No  food allergies: No  cough: No  shortness of breath: No  sweating: No  dysuria: No  frequency: No  difficulty urinating: No  hematuria: No  painful intercourse: No  chest pain: No  palpitations: No  nausea: No  vomiting: No  diarrhea: No  blood in stool: No  constipation: No  headaches: No  dizziness: No  numbness: No  seizures: No  joint swelling: No  myalgia: No  weakness: No  back pain: No  Pain Chronicity: new  History of trauma: Yes  Onset: more than 1 month ago  Frequency: daily  Progression since onset: gradually worsening  injury location: at home  pain- numeric:  0/10  pain location: left shoulder, left elbow  pain quality: aching, dull, tingling  Radiating Pain: No  Aggravating factors: flexion, lifting  fever: No  inability to bear weight: No  itching: No  joint locking: No  limited range of motion: No  stiffness: No  tingling: Yes  Treatments tried: brace/corset, exercise, movement, NSAIDs, OTC pain meds, rest, other  physical therapy: not tried  Improvement on treatment: no relief      Physical Examination:    Vital Signs:    Vitals:    09/30/20 0833   Resp: 16       Body mass index is 21.4 kg/m².    This a well-developed, well nourished patient in no acute distress.  They are alert and oriented and cooperative to examination.  Pt. walks without an antalgic gait.      Examination of the left shoulder shows no rashes or erythema. There are no masses, ecchymosis, or atrophy. The patient has full range of motion in forward flexion, external rotation, and internal rotation to the mid T-spine. The patient has - impingement signs. - McLaughlin's test. - Speeds test. Nontender to palpation over a.c. joint. Normal stability anteriorly, posteriorly, and negative sulcus sign. Passive range of motion: Forward flexion of 180°, external rotation at 90° of 90°, internal rotation of 50°, and external rotation at 0° of 50°. 2+ radial pulse. Intact axillary, radial, median and ulnar sensation. 5 out of 5 resisted forward flexion, external rotation, and negative lift off test.  Some slight scapular protraction but improved from previous          X-rays:  X-rays of the left shoulder  reviewed which show normal left shoulder     Assessment::  Left scapulothoracic pain    Plan:  Reviewed the findings with her today.  Continue with physical therapy.  She has just been taking Tylenol and I recommended changing to an NSAID.  Follow-up in 6 weeks.    This note was created using [employer] voice recognition software that occasionally misinterpreted phrases or words.    Consult note is delivered via  Epic messaging service.

## 2020-11-11 ENCOUNTER — OFFICE VISIT (OUTPATIENT)
Dept: ORTHOPEDICS | Facility: CLINIC | Age: 30
End: 2020-11-11
Payer: COMMERCIAL

## 2020-11-11 VITALS — HEIGHT: 62 IN | RESPIRATION RATE: 16 BRPM | WEIGHT: 117 LBS | BODY MASS INDEX: 21.53 KG/M2

## 2020-11-11 DIAGNOSIS — M54.2 NECK PAIN: Primary | ICD-10-CM

## 2020-11-11 DIAGNOSIS — M89.8X1 PAIN OF LEFT SCAPULA: Primary | ICD-10-CM

## 2020-11-11 PROCEDURE — 3008F PR BODY MASS INDEX (BMI) DOCUMENTED: ICD-10-PCS | Mod: CPTII,S$GLB,, | Performed by: ORTHOPAEDIC SURGERY

## 2020-11-11 PROCEDURE — 99999 PR PBB SHADOW E&M-EST. PATIENT-LVL III: CPT | Mod: PBBFAC,,, | Performed by: ORTHOPAEDIC SURGERY

## 2020-11-11 PROCEDURE — 99999 PR PBB SHADOW E&M-EST. PATIENT-LVL III: ICD-10-PCS | Mod: PBBFAC,,, | Performed by: ORTHOPAEDIC SURGERY

## 2020-11-11 PROCEDURE — 3008F BODY MASS INDEX DOCD: CPT | Mod: CPTII,S$GLB,, | Performed by: ORTHOPAEDIC SURGERY

## 2020-11-11 PROCEDURE — 99213 PR OFFICE/OUTPT VISIT, EST, LEVL III, 20-29 MIN: ICD-10-PCS | Mod: S$GLB,,, | Performed by: ORTHOPAEDIC SURGERY

## 2020-11-11 PROCEDURE — 99213 OFFICE O/P EST LOW 20 MIN: CPT | Mod: S$GLB,,, | Performed by: ORTHOPAEDIC SURGERY

## 2020-11-11 NOTE — PROGRESS NOTES
History reviewed. No pertinent past medical history.    Past Surgical History:   Procedure Laterality Date    ankle arthroscopy      DILATION AND CURETTAGE OF UTERUS      TONSILLECTOMY         Current Outpatient Medications   Medication Sig    PNV 79-iron-folic-lmfolate-dha 27 mg-400 mcg- 1.13 mg-250 mg Cap Take 1 capsule by mouth once daily.     No current facility-administered medications for this visit.        Review of patient's allergies indicates:   Allergen Reactions    Penicillins Hives and Rash       Family History   Problem Relation Age of Onset    Diabetes Mother     Hyperlipidemia Mother     Heart disease Father     Hyperlipidemia Father     Diabetes Father        Social History     Socioeconomic History    Marital status:      Spouse name: Not on file    Number of children: Not on file    Years of education: Not on file    Highest education level: Not on file   Occupational History    Not on file   Social Needs    Financial resource strain: Not on file    Food insecurity     Worry: Not on file     Inability: Not on file    Transportation needs     Medical: Not on file     Non-medical: Not on file   Tobacco Use    Smoking status: Never Smoker    Smokeless tobacco: Never Used   Substance and Sexual Activity    Alcohol use: No    Drug use: Not on file    Sexual activity: Not on file   Lifestyle    Physical activity     Days per week: Not on file     Minutes per session: Not on file    Stress: Not on file   Relationships    Social connections     Talks on phone: Not on file     Gets together: Not on file     Attends Latter-day service: Not on file     Active member of club or organization: Not on file     Attends meetings of clubs or organizations: Not on file     Relationship status: Not on file   Other Topics Concern    Not on file   Social History Narrative    Not on file       Chief Complaint:   Chief Complaint   Patient presents with    Left Shoulder - Pain        History of present illness:  This is a 30-year-old right-hand-dominant female seen for left scapular pain as well as some neck pain.  Patient states that she has had some symptoms similar for years. No injury or trauma.  It is worse when sitting at a keyboard or lifting her toddler up.  Pain is a 3/10.  We started her on some physical therapy and the numbness and tingling around the shoulder had gotten better for a while but now is back.  The therapist thinks it is related to her neck.  Just more sore and her postural issues have not fully resolved but are improving.      Answers for HPI/ROS submitted by the patient on 8/13/2020   Arm pain  unexpected weight change: No  appetite change : No  sleep disturbance: Yes  IMMUNOCOMPROMISED: No  nervous/ anxious: No  dysphoric mood: No  rash: No  visual disturbance: No  eye redness: No  eye pain: No  ear pain: No  tinnitus: No  hearing loss: No  sinus pressure : No  nosebleeds: No  enviro allergies: No  food allergies: No  cough: No  shortness of breath: No  sweating: No  dysuria: No  frequency: No  difficulty urinating: No  hematuria: No  painful intercourse: No  chest pain: No  palpitations: No  nausea: No  vomiting: No  diarrhea: No  blood in stool: No  constipation: No  headaches: No  dizziness: No  numbness: No  seizures: No  joint swelling: No  myalgia: No  weakness: No  back pain: No  Pain Chronicity: new  History of trauma: Yes  Onset: more than 1 month ago  Frequency: daily  Progression since onset: gradually worsening  injury location: at home  pain- numeric: 0/10  pain location: left shoulder, left elbow  pain quality: aching, dull, tingling  Radiating Pain: No  Aggravating factors: flexion, lifting  fever: No  inability to bear weight: No  itching: No  joint locking: No  limited range of motion: No  stiffness: No  tingling: Yes  Treatments tried: brace/corset, exercise, movement, NSAIDs, OTC pain meds, rest, other  physical therapy: not tried  Improvement  on treatment: no relief      Physical Examination:    Vital Signs:    Vitals:    11/11/20 0847   Resp: 16       Body mass index is 21.4 kg/m².    This a well-developed, well nourished patient in no acute distress.  They are alert and oriented and cooperative to examination.  Pt. walks without an antalgic gait.      Examination of the left shoulder shows no rashes or erythema. There are no masses, ecchymosis, or atrophy. The patient has full range of motion in forward flexion, external rotation, and internal rotation to the mid T-spine. The patient has - impingement signs. - Gates's test. - Speeds test. Nontender to palpation over a.c. joint. Normal stability anteriorly, posteriorly, and negative sulcus sign. Passive range of motion: Forward flexion of 180°, external rotation at 90° of 90°, internal rotation of 50°, and external rotation at 0° of 50°. 2+ radial pulse. Intact axillary, radial, median and ulnar sensation. 5 out of 5 resisted forward flexion, external rotation, and negative lift off test.  Some slight scapular protraction but improved from previous          X-rays:  X-rays of the left shoulder  reviewed which show normal left shoulder     Assessment::  Left scapulothoracic pain  Possible cervical involvement    Plan:  Reviewed the findings with her today.  We will get her in with the back and spine to evaluate her neck.  Follow-up with me as needed.      This note was created using [employer] voice recognition software that occasionally misinterpreted phrases or words.    Consult note is delivered via Epic messaging service.

## 2020-11-12 ENCOUNTER — OFFICE VISIT (OUTPATIENT)
Dept: PAIN MEDICINE | Facility: CLINIC | Age: 30
End: 2020-11-12
Payer: COMMERCIAL

## 2020-11-12 VITALS
SYSTOLIC BLOOD PRESSURE: 110 MMHG | OXYGEN SATURATION: 100 % | HEIGHT: 63 IN | WEIGHT: 111.56 LBS | BODY MASS INDEX: 19.77 KG/M2 | RESPIRATION RATE: 18 BRPM | TEMPERATURE: 97 F | DIASTOLIC BLOOD PRESSURE: 51 MMHG | HEART RATE: 91 BPM

## 2020-11-12 DIAGNOSIS — M54.2 NECK PAIN: ICD-10-CM

## 2020-11-12 DIAGNOSIS — M54.12 RADICULOPATHY, CERVICAL REGION: Primary | ICD-10-CM

## 2020-11-12 PROCEDURE — 3008F PR BODY MASS INDEX (BMI) DOCUMENTED: ICD-10-PCS | Mod: CPTII,S$GLB,, | Performed by: ANESTHESIOLOGY

## 2020-11-12 PROCEDURE — 99203 PR OFFICE/OUTPT VISIT, NEW, LEVL III, 30-44 MIN: ICD-10-PCS | Mod: S$GLB,,, | Performed by: ANESTHESIOLOGY

## 2020-11-12 PROCEDURE — 1126F PR PAIN SEVERITY QUANTIFIED, NO PAIN PRESENT: ICD-10-PCS | Mod: S$GLB,,, | Performed by: ANESTHESIOLOGY

## 2020-11-12 PROCEDURE — 3008F BODY MASS INDEX DOCD: CPT | Mod: CPTII,S$GLB,, | Performed by: ANESTHESIOLOGY

## 2020-11-12 PROCEDURE — 99999 PR PBB SHADOW E&M-EST. PATIENT-LVL IV: CPT | Mod: PBBFAC,,, | Performed by: ANESTHESIOLOGY

## 2020-11-12 PROCEDURE — 99999 PR PBB SHADOW E&M-EST. PATIENT-LVL IV: ICD-10-PCS | Mod: PBBFAC,,, | Performed by: ANESTHESIOLOGY

## 2020-11-12 PROCEDURE — 99203 OFFICE O/P NEW LOW 30 MIN: CPT | Mod: S$GLB,,, | Performed by: ANESTHESIOLOGY

## 2020-11-12 PROCEDURE — 1126F AMNT PAIN NOTED NONE PRSNT: CPT | Mod: S$GLB,,, | Performed by: ANESTHESIOLOGY

## 2020-11-12 RX ORDER — METHOCARBAMOL 500 MG/1
500 TABLET, FILM COATED ORAL 2 TIMES DAILY PRN
Qty: 30 TABLET | Refills: 0 | Status: SHIPPED | OUTPATIENT
Start: 2020-11-12 | End: 2021-04-07

## 2020-11-12 NOTE — PROGRESS NOTES
[facility] New Patient Evaluation    Referred by: Dr. Garcia  Reason for referral: neck pain    CC:   Chief Complaint   Patient presents with    Establish Care    Neck Pain    Shoulder Pain    Numbness      Last 3 PDI Scores 11/12/2020   Pain Disability Index (PDI) 0       HPI:   Cheryl Peter is a 30 y.o. female who complains of neck pain    Onset: 6 months  Inciting Event: none  Progression: since onset, pain is stable  Typical Range: 2-6/10  Timing: intermittent  Quality: aching, tingling  Radiation: no  Associated numbness or weakness: yes numbness, yes weakness of the left hand  Exacerbated by: neck flexion  Allievated by: PT, neck retraction  Is Pain Level Acceptable?: No    Previous Therapies:  PT/OT: yes, currently  HEP: yes, currently  Interventions:   Surgery:  Medications:   - NSAIDS: ibuprofen  - MSK Relaxants:   - TCAs:   - SNRIs:   - Topicals:   - Anticonvulsants:  - Opioids:      History:    Current Outpatient Medications:     methocarbamoL (ROBAXIN) 500 MG Tab, Take 1 tablet (500 mg total) by mouth 2 (two) times daily as needed., Disp: 30 tablet, Rfl: 0    PNV 79-iron-folic-lmfolate-dha 27 mg-400 mcg- 1.13 mg-250 mg Cap, Take 1 capsule by mouth once daily., Disp: 90 capsule, Rfl: 6    History reviewed. No pertinent past medical history.    Past Surgical History:   Procedure Laterality Date    ankle arthroscopy      DILATION AND CURETTAGE OF UTERUS      TONSILLECTOMY         Family History   Problem Relation Age of Onset    Diabetes Mother     Hyperlipidemia Mother     Heart disease Father     Hyperlipidemia Father     Diabetes Father        Social History     Socioeconomic History    Marital status:      Spouse name: Not on file    Number of children: Not on file    Years of education: Not on file    Highest education level: Not on file   Occupational History    Not on file   Social Needs    Financial resource strain: Not on file    Food insecurity      "Worry: Not on file     Inability: Not on file    Transportation needs     Medical: Not on file     Non-medical: Not on file   Tobacco Use    Smoking status: Never Smoker    Smokeless tobacco: Never Used   Substance and Sexual Activity    Alcohol use: No    Drug use: Not on file    Sexual activity: Not on file   Lifestyle    Physical activity     Days per week: Not on file     Minutes per session: Not on file    Stress: Not on file   Relationships    Social connections     Talks on phone: Not on file     Gets together: Not on file     Attends [employer] service: Not on file     Active member of club or organization: Not on file     Attends meetings of clubs or organizations: Not on file     Relationship status: Not on file   Other Topics Concern    Not on file   Social History Narrative    Not on file       Review of patient's allergies indicates:   Allergen Reactions    Penicillins Hives and Rash       Review of Systems:  General ROS: negative for - fever  Psychological ROS: negative for - hostility  Hematological and Lymphatic ROS: negative for - bleeding problems  Endocrine ROS: negative for - unexpected weight changes  Respiratory ROS: no cough, shortness of breath, or wheezing  Cardiovascular ROS: no chest pain or dyspnea on exertion  Gastrointestinal ROS: no abdominal pain, change in bowel habits, or black or bloody stools  Musculoskeletal ROS: positive for - muscular weakness  Neurological ROS: positive for - numbness/tingling  negative for - bowel and bladder control changes or gait disturbance  Dermatological ROS: negative for rash    Physical Exam:  Vitals:    11/12/20 0804   BP: (!) 110/51   Pulse: 91   Resp: 18   Temp: 97.4 °F (36.3 °C)   TempSrc: Temporal   SpO2: 100%   Weight: 50.6 kg (111 lb 8.8 oz)   Height: 5' 3" (1.6 m)   PainSc: 0-No pain     Body mass index is 19.76 kg/m².     Gen: NAD  Psych: mood appropriate for given condition  CV: 2+ radial pulse  HEENT: anicteric   Respiratory: non " labored  Abd: soft nt, nd  Skin: intact  Sensation: intact to lt touch bilaterally in c4-t1   Reflexes: 0/0 b/l Bicep, 2+ b/l tricep and patella Brown positive bilaterally  ROM: Cervical ROM full, shoulder, elbow and wrist ROM full  Tone:  Normal at elbow, wrist and shoulder   Inspection: no atrophy of bicep, FDI or APB noted  Special tests: negative axial facet loading  Palpation: tender cervical paraspinals, levator scapula and trapezius    Motor:    Right Left   C4 Shoulder Abduction  5  5   C5 Elbow Flexion    5  5   C6 Wrist Extension  5  5   C7 Elbow Extension   5  5   C8/T1 Hand Intrinsics   5  5                 Imaging:  none    Labs:  BMP  Lab Results   Component Value Date     09/21/2018    K 4.4 09/21/2018     09/21/2018    CO2 24 09/21/2018    BUN 12 09/21/2018    CREATININE 0.62 09/21/2018    CALCIUM 10.3 (H) 09/21/2018    ANIONGAP 12 09/21/2018    ESTGFRAFRICA >60 09/21/2018    EGFRNONAA >60 09/21/2018     Lab Results   Component Value Date    ALT 25 09/21/2018    AST 23 09/21/2018    ALKPHOS 36 (L) 09/21/2018    BILITOT 0.4 09/21/2018       Assessment:   Problem List Items Addressed This Visit     None      Visit Diagnoses     Radiculopathy, cervical region    -  Primary    Relevant Orders    MRI Cervical Spine Without Contrast    Neck pain            30 y.o. year old female presents to the office with neck pain.  She says the pain started about 6 months ago.  No inciting event, no trauma, had a child a year ago.  Today she reports aching pain into her left shoulder and scapula with tingling, ranges from 2-6/10, intermittent.  In the past she has had some weakness into her left hand.  She denies any current weakness, bowel/bladder dysfunction, or gait changes.  On exam she has 2+ b/l triceps and patellar dtr, 0/0 b/l biceps, intact sensation to light touch b/l C4-T1, + brown's bilaterally.  Differential is scapulothoracic myofascial pain vs cervical radiculopathy.  Given her persistent  pain despite maximizing conservative care and also positive jamil's will get a cervical MRI to evaluate her neuroanatomy.  We will call her once it's done to discuss further treatment options.      Treatment Plan:   Procedures: none at this time  PT/OT/HEP: over the past 12 weeks she has done PT and home exercises without significant relief  Medications: over the past 12 weeks she has taken ibuprofen without significant relief.  Trial robaxin 500mg po bid prn for any myofascial component of her pain.    Labs: Reviewed and medications are appropriately dosed for current hepatorenal function.  Imaging: MRI cervical spine    : Not applicable    Gerhard Kumar M.D.  Interventional Pain Medicine / Anesthesiology

## 2020-11-12 NOTE — LETTER
November 12, 2020      Kj Garcia MD  [address] [place] [address] [address]  [place] [address]           [facility]  [address] [place] [address]  Phone: [phone]  Fax: [phone]          Patient: Cheryl Peter   MR Number: [id number]   YOB: 1990   Date of Visit: 11/12/2020       Dear Dr. Kj Garcia:    Thank you for referring Cheryl Peter to me for evaluation. Attached you will find relevant portions of my assessment and plan of care.    If you have questions, please do not hesitate to call me. I look forward to following Cheryl Peter along with you.    Sincerely,    Gerhard Kumar MD    Enclosure  CC:  No Recipients    If you would like to receive this communication electronically, please contact [email or web address] or [phone] to request more information on [employer] access.    For providers and/or their staff who would like to refer a patient to [facility], please contact us through our one-stop-shop provider referral line, [facility], at [phone].    If you feel you have received this communication in error or would no longer like to receive these types of communications, please e-mail [email or web address]

## 2020-11-13 ENCOUNTER — TELEPHONE (OUTPATIENT)
Dept: PAIN MEDICINE | Facility: CLINIC | Age: 30
End: 2020-11-13

## 2020-11-13 ENCOUNTER — HOSPITAL ENCOUNTER (OUTPATIENT)
Dept: RADIOLOGY | Facility: HOSPITAL | Age: 30
Discharge: HOME | End: 2020-11-13
Attending: ANESTHESIOLOGY
Payer: COMMERCIAL

## 2020-11-13 DIAGNOSIS — M79.18 MYOFASCIAL PAIN: Primary | ICD-10-CM

## 2020-11-13 DIAGNOSIS — M54.12 RADICULOPATHY, CERVICAL REGION: ICD-10-CM

## 2020-11-13 PROCEDURE — 72141 MRI NECK SPINE W/O DYE: CPT | Mod: TC,PO

## 2020-11-13 PROCEDURE — 72141 MRI CERVICAL SPINE WITHOUT CONTRAST: ICD-10-PCS | Mod: 26,,, | Performed by: RADIOLOGY

## 2020-11-13 PROCEDURE — 72141 MRI NECK SPINE W/O DYE: CPT | Mod: 26,,, | Performed by: RADIOLOGY

## 2020-11-13 NOTE — TELEPHONE ENCOUNTER
I spoke to Ms. Rome regarding her MRI results.  No significant pathology.    I've placed a referral for her to see Dr. Brothers for eval for scapulothoracic muscle pain.

## 2020-12-01 ENCOUNTER — OFFICE VISIT (OUTPATIENT)
Dept: PHYSICAL MEDICINE AND REHAB | Facility: CLINIC | Age: 30
End: 2020-12-01
Payer: COMMERCIAL

## 2020-12-01 VITALS — HEIGHT: 63 IN | WEIGHT: 112.44 LBS | BODY MASS INDEX: 19.92 KG/M2

## 2020-12-01 DIAGNOSIS — M79.18 MYOFASCIAL PAIN: ICD-10-CM

## 2020-12-01 DIAGNOSIS — M89.9 SCAPULAR DYSFUNCTION: Primary | ICD-10-CM

## 2020-12-01 PROCEDURE — 99999 PR PBB SHADOW E&M-EST. PATIENT-LVL III: CPT | Mod: PBBFAC,,, | Performed by: PHYSICAL MEDICINE & REHABILITATION

## 2020-12-01 PROCEDURE — 99243 PR OFFICE CONSULTATION,LEVEL III: ICD-10-PCS | Mod: S$GLB,,, | Performed by: PHYSICAL MEDICINE & REHABILITATION

## 2020-12-01 PROCEDURE — 99999 PR PBB SHADOW E&M-EST. PATIENT-LVL III: ICD-10-PCS | Mod: PBBFAC,,, | Performed by: PHYSICAL MEDICINE & REHABILITATION

## 2020-12-01 PROCEDURE — 3008F BODY MASS INDEX DOCD: CPT | Mod: CPTII,S$GLB,, | Performed by: PHYSICAL MEDICINE & REHABILITATION

## 2020-12-01 PROCEDURE — 1125F PR PAIN SEVERITY QUANTIFIED, PAIN PRESENT: ICD-10-PCS | Mod: S$GLB,,, | Performed by: PHYSICAL MEDICINE & REHABILITATION

## 2020-12-01 PROCEDURE — 99243 OFF/OP CNSLTJ NEW/EST LOW 30: CPT | Mod: S$GLB,,, | Performed by: PHYSICAL MEDICINE & REHABILITATION

## 2020-12-01 PROCEDURE — 3008F PR BODY MASS INDEX (BMI) DOCUMENTED: ICD-10-PCS | Mod: CPTII,S$GLB,, | Performed by: PHYSICAL MEDICINE & REHABILITATION

## 2020-12-01 PROCEDURE — 1125F AMNT PAIN NOTED PAIN PRSNT: CPT | Mod: S$GLB,,, | Performed by: PHYSICAL MEDICINE & REHABILITATION

## 2020-12-01 NOTE — LETTER
December 1, 2020      Gerhard Kumar MD  [address] [facility] [address] [place] [address]           [facility]  [address] [place] [address]  Phone: [phone]  Fax: [phone]          Patient: Cheryl Peter   MR Number: [id number]   YOB: 1990   Date of Visit: 12/1/2020       Dear Dr. Gerhard Kumar:    Thank you for referring Cheryl Peter to me for evaluation. Attached you will find relevant portions of my assessment and plan of care.    If you have questions, please do not hesitate to call me. I look forward to following Cheryl Peter along with you.    Sincerely,    Jr Brothers MD    Enclosure  CC:  No Recipients    If you would like to receive this communication electronically, please contact [email or web address] or [phone] to request more information on [employer] Link access.    For providers and/or their staff who would like to refer a patient to [facility], please contact us through our one-stop-shop provider referral line, [facility], at [phone].    If you feel you have received this communication in error or would no longer like to receive these types of communications, please e-mail [email or web address]

## 2021-02-09 ENCOUNTER — OFFICE VISIT (OUTPATIENT)
Dept: OTOLARYNGOLOGY | Facility: CLINIC | Age: 31
End: 2021-02-09
Payer: COMMERCIAL

## 2021-02-09 VITALS — BODY MASS INDEX: 19.92 KG/M2 | WEIGHT: 112.44 LBS | HEIGHT: 63 IN

## 2021-02-09 DIAGNOSIS — J02.9 EXUDATIVE PHARYNGITIS: Primary | ICD-10-CM

## 2021-02-09 PROCEDURE — 3008F PR BODY MASS INDEX (BMI) DOCUMENTED: ICD-10-PCS | Mod: CPTII,S$GLB,, | Performed by: NURSE PRACTITIONER

## 2021-02-09 PROCEDURE — 99214 PR OFFICE/OUTPT VISIT, EST, LEVL IV, 30-39 MIN: ICD-10-PCS | Mod: S$GLB,,, | Performed by: NURSE PRACTITIONER

## 2021-02-09 PROCEDURE — 99214 OFFICE O/P EST MOD 30 MIN: CPT | Mod: S$GLB,,, | Performed by: NURSE PRACTITIONER

## 2021-02-09 PROCEDURE — 87070 CULTURE OTHR SPECIMN AEROBIC: CPT

## 2021-02-09 PROCEDURE — 1125F PR PAIN SEVERITY QUANTIFIED, PAIN PRESENT: ICD-10-PCS | Mod: S$GLB,,, | Performed by: NURSE PRACTITIONER

## 2021-02-09 PROCEDURE — 99999 PR PBB SHADOW E&M-EST. PATIENT-LVL III: CPT | Mod: PBBFAC,,, | Performed by: NURSE PRACTITIONER

## 2021-02-09 PROCEDURE — 99999 PR PBB SHADOW E&M-EST. PATIENT-LVL III: ICD-10-PCS | Mod: PBBFAC,,, | Performed by: NURSE PRACTITIONER

## 2021-02-09 PROCEDURE — 3008F BODY MASS INDEX DOCD: CPT | Mod: CPTII,S$GLB,, | Performed by: NURSE PRACTITIONER

## 2021-02-09 PROCEDURE — 1125F AMNT PAIN NOTED PAIN PRSNT: CPT | Mod: S$GLB,,, | Performed by: NURSE PRACTITIONER

## 2021-02-09 RX ORDER — ACETAMINOPHEN, DIPHENHYDRAMINE HCL, PHENYLEPHRINE HCL 325; 25; 5 MG/1; MG/1; MG/1
TABLET ORAL
COMMUNITY
End: 2022-11-22

## 2021-02-09 RX ORDER — CEFDINIR 300 MG/1
600 CAPSULE ORAL DAILY
Qty: 20 CAPSULE | Refills: 0 | Status: SHIPPED | OUTPATIENT
Start: 2021-02-09 | End: 2021-02-19

## 2021-02-11 ENCOUNTER — TELEPHONE (OUTPATIENT)
Dept: OTOLARYNGOLOGY | Facility: CLINIC | Age: 31
End: 2021-02-11

## 2021-02-11 LAB — BACTERIA THROAT CULT: NORMAL

## 2021-03-03 ENCOUNTER — PATIENT MESSAGE (OUTPATIENT)
Dept: OTOLARYNGOLOGY | Facility: CLINIC | Age: 31
End: 2021-03-03

## 2021-03-04 DIAGNOSIS — B37.31 VULVAL CANDIDIASIS: Primary | ICD-10-CM

## 2021-03-04 RX ORDER — FLUCONAZOLE 150 MG/1
150 TABLET ORAL DAILY
Qty: 2 TABLET | Refills: 0 | Status: SHIPPED | OUTPATIENT
Start: 2021-03-04 | End: 2021-03-06

## 2021-04-07 ENCOUNTER — OFFICE VISIT (OUTPATIENT)
Dept: FAMILY MEDICINE | Facility: CLINIC | Age: 31
End: 2021-04-07
Payer: COMMERCIAL

## 2021-04-07 ENCOUNTER — LAB VISIT (OUTPATIENT)
Dept: LAB | Facility: HOSPITAL | Age: 31
End: 2021-04-07
Attending: PHYSICIAN ASSISTANT
Payer: COMMERCIAL

## 2021-04-07 VITALS
HEART RATE: 82 BPM | OXYGEN SATURATION: 98 % | SYSTOLIC BLOOD PRESSURE: 100 MMHG | WEIGHT: 113.13 LBS | HEIGHT: 62 IN | DIASTOLIC BLOOD PRESSURE: 64 MMHG | BODY MASS INDEX: 20.82 KG/M2

## 2021-04-07 DIAGNOSIS — M25.50 PAIN IN JOINT, MULTIPLE SITES: ICD-10-CM

## 2021-04-07 DIAGNOSIS — R53.82 CHRONIC FATIGUE: ICD-10-CM

## 2021-04-07 DIAGNOSIS — M79.7 FIBROMYALGIA: ICD-10-CM

## 2021-04-07 DIAGNOSIS — Z76.89 ENCOUNTER TO ESTABLISH CARE: Primary | ICD-10-CM

## 2021-04-07 PROCEDURE — 99999 PR PBB SHADOW E&M-EST. PATIENT-LVL III: ICD-10-PCS | Mod: PBBFAC,,, | Performed by: PHYSICIAN ASSISTANT

## 2021-04-07 PROCEDURE — 80061 LIPID PANEL: CPT | Performed by: PHYSICIAN ASSISTANT

## 2021-04-07 PROCEDURE — 86225 DNA ANTIBODY NATIVE: CPT | Performed by: PHYSICIAN ASSISTANT

## 2021-04-07 PROCEDURE — 86431 RHEUMATOID FACTOR QUANT: CPT | Performed by: PHYSICIAN ASSISTANT

## 2021-04-07 PROCEDURE — 3008F BODY MASS INDEX DOCD: CPT | Mod: CPTII,S$GLB,, | Performed by: PHYSICIAN ASSISTANT

## 2021-04-07 PROCEDURE — 82306 VITAMIN D 25 HYDROXY: CPT | Performed by: PHYSICIAN ASSISTANT

## 2021-04-07 PROCEDURE — 84443 ASSAY THYROID STIM HORMONE: CPT | Performed by: PHYSICIAN ASSISTANT

## 2021-04-07 PROCEDURE — 99204 PR OFFICE/OUTPT VISIT, NEW, LEVL IV, 45-59 MIN: ICD-10-PCS | Mod: S$GLB,,, | Performed by: PHYSICIAN ASSISTANT

## 2021-04-07 PROCEDURE — 3008F PR BODY MASS INDEX (BMI) DOCUMENTED: ICD-10-PCS | Mod: CPTII,S$GLB,, | Performed by: PHYSICIAN ASSISTANT

## 2021-04-07 PROCEDURE — 85025 COMPLETE CBC W/AUTO DIFF WBC: CPT | Performed by: PHYSICIAN ASSISTANT

## 2021-04-07 PROCEDURE — 83540 ASSAY OF IRON: CPT | Performed by: PHYSICIAN ASSISTANT

## 2021-04-07 PROCEDURE — 82607 VITAMIN B-12: CPT | Performed by: PHYSICIAN ASSISTANT

## 2021-04-07 PROCEDURE — 80053 COMPREHEN METABOLIC PANEL: CPT | Performed by: PHYSICIAN ASSISTANT

## 2021-04-07 PROCEDURE — 82728 ASSAY OF FERRITIN: CPT | Performed by: PHYSICIAN ASSISTANT

## 2021-04-07 PROCEDURE — 99999 PR PBB SHADOW E&M-EST. PATIENT-LVL III: CPT | Mod: PBBFAC,,, | Performed by: PHYSICIAN ASSISTANT

## 2021-04-07 PROCEDURE — 36415 COLL VENOUS BLD VENIPUNCTURE: CPT | Mod: PO | Performed by: PHYSICIAN ASSISTANT

## 2021-04-07 PROCEDURE — 99204 OFFICE O/P NEW MOD 45 MIN: CPT | Mod: S$GLB,,, | Performed by: PHYSICIAN ASSISTANT

## 2021-04-07 RX ORDER — DULOXETIN HYDROCHLORIDE 30 MG/1
30 CAPSULE, DELAYED RELEASE ORAL DAILY
Qty: 30 CAPSULE | Refills: 1 | Status: SHIPPED | OUTPATIENT
Start: 2021-04-07 | End: 2021-04-29

## 2021-04-08 LAB
25(OH)D3+25(OH)D2 SERPL-MCNC: 25 NG/ML (ref 30–96)
ALBUMIN SERPL BCP-MCNC: 4.2 G/DL (ref 3.5–5.2)
ALP SERPL-CCNC: 41 U/L (ref 55–135)
ALT SERPL W/O P-5'-P-CCNC: 7 U/L (ref 10–44)
ANION GAP SERPL CALC-SCNC: 9 MMOL/L (ref 8–16)
AST SERPL-CCNC: 14 U/L (ref 10–40)
BASOPHILS # BLD AUTO: 0.03 K/UL (ref 0–0.2)
BASOPHILS NFR BLD: 0.6 % (ref 0–1.9)
BILIRUB SERPL-MCNC: 0.2 MG/DL (ref 0.1–1)
BUN SERPL-MCNC: 16 MG/DL (ref 6–20)
CALCIUM SERPL-MCNC: 9.3 MG/DL (ref 8.7–10.5)
CHLORIDE SERPL-SCNC: 104 MMOL/L (ref 95–110)
CHOLEST SERPL-MCNC: 162 MG/DL (ref 120–199)
CHOLEST/HDLC SERPL: 2.7 {RATIO} (ref 2–5)
CO2 SERPL-SCNC: 22 MMOL/L (ref 23–29)
CREAT SERPL-MCNC: 0.8 MG/DL (ref 0.5–1.4)
DIFFERENTIAL METHOD: NORMAL
DSDNA AB SER-ACNC: NORMAL [IU]/ML
EOSINOPHIL # BLD AUTO: 0 K/UL (ref 0–0.5)
EOSINOPHIL NFR BLD: 0.8 % (ref 0–8)
ERYTHROCYTE [DISTWIDTH] IN BLOOD BY AUTOMATED COUNT: 13 % (ref 11.5–14.5)
EST. GFR  (AFRICAN AMERICAN): >60 ML/MIN/1.73 M^2
EST. GFR  (NON AFRICAN AMERICAN): >60 ML/MIN/1.73 M^2
FERRITIN SERPL-MCNC: 13 NG/ML (ref 20–300)
GLUCOSE SERPL-MCNC: 84 MG/DL (ref 70–110)
HCT VFR BLD AUTO: 39.2 % (ref 37–48.5)
HDLC SERPL-MCNC: 61 MG/DL (ref 40–75)
HDLC SERPL: 37.7 % (ref 20–50)
HGB BLD-MCNC: 12.6 G/DL (ref 12–16)
IMM GRANULOCYTES # BLD AUTO: 0.01 K/UL (ref 0–0.04)
IMM GRANULOCYTES NFR BLD AUTO: 0.2 % (ref 0–0.5)
IRON SERPL-MCNC: 44 UG/DL (ref 30–160)
LDLC SERPL CALC-MCNC: 83.6 MG/DL (ref 63–159)
LYMPHOCYTES # BLD AUTO: 2.5 K/UL (ref 1–4.8)
LYMPHOCYTES NFR BLD: 46.2 % (ref 18–48)
MCH RBC QN AUTO: 29.9 PG (ref 27–31)
MCHC RBC AUTO-ENTMCNC: 32.1 G/DL (ref 32–36)
MCV RBC AUTO: 93 FL (ref 82–98)
MONOCYTES # BLD AUTO: 0.4 K/UL (ref 0.3–1)
MONOCYTES NFR BLD: 7.5 % (ref 4–15)
NEUTROPHILS # BLD AUTO: 2.4 K/UL (ref 1.8–7.7)
NEUTROPHILS NFR BLD: 44.7 % (ref 38–73)
NONHDLC SERPL-MCNC: 101 MG/DL
NRBC BLD-RTO: 0 /100 WBC
PLATELET # BLD AUTO: 292 K/UL (ref 150–450)
PMV BLD AUTO: 9.9 FL (ref 9.2–12.9)
POTASSIUM SERPL-SCNC: 3.9 MMOL/L (ref 3.5–5.1)
PROT SERPL-MCNC: 7.6 G/DL (ref 6–8.4)
RBC # BLD AUTO: 4.21 M/UL (ref 4–5.4)
RHEUMATOID FACT SERPL-ACNC: <10 IU/ML (ref 0–15)
SATURATED IRON: 10 % (ref 20–50)
SODIUM SERPL-SCNC: 135 MMOL/L (ref 136–145)
TOTAL IRON BINDING CAPACITY: 440 UG/DL (ref 250–450)
TRANSFERRIN SERPL-MCNC: 297 MG/DL (ref 200–375)
TRIGL SERPL-MCNC: 87 MG/DL (ref 30–150)
TSH SERPL DL<=0.005 MIU/L-ACNC: 1.84 UIU/ML (ref 0.4–4)
VIT B12 SERPL-MCNC: 373 PG/ML (ref 210–950)
WBC # BLD AUTO: 5.3 K/UL (ref 3.9–12.7)

## 2021-04-09 DIAGNOSIS — E61.1 IRON DEFICIENCY: Primary | ICD-10-CM

## 2021-04-09 RX ORDER — FERROUS SULFATE 325(65) MG
TABLET ORAL
Qty: 30 TABLET | Refills: 3 | Status: SHIPPED | OUTPATIENT
Start: 2021-04-09 | End: 2021-08-05

## 2021-04-19 ENCOUNTER — PATIENT MESSAGE (OUTPATIENT)
Dept: FAMILY MEDICINE | Facility: CLINIC | Age: 31
End: 2021-04-19

## 2021-04-21 ENCOUNTER — PATIENT OUTREACH (OUTPATIENT)
Dept: ADMINISTRATIVE | Facility: HOSPITAL | Age: 31
End: 2021-04-21

## 2021-04-21 ENCOUNTER — PATIENT MESSAGE (OUTPATIENT)
Dept: ADMINISTRATIVE | Facility: HOSPITAL | Age: 31
End: 2021-04-21

## 2021-04-21 ENCOUNTER — PATIENT MESSAGE (OUTPATIENT)
Dept: FAMILY MEDICINE | Facility: CLINIC | Age: 31
End: 2021-04-21

## 2021-04-23 ENCOUNTER — PATIENT OUTREACH (OUTPATIENT)
Dept: ADMINISTRATIVE | Facility: HOSPITAL | Age: 31
End: 2021-04-23

## 2021-04-29 ENCOUNTER — PATIENT MESSAGE (OUTPATIENT)
Dept: RESEARCH | Facility: HOSPITAL | Age: 31
End: 2021-04-29

## 2021-05-05 ENCOUNTER — OFFICE VISIT (OUTPATIENT)
Dept: FAMILY MEDICINE | Facility: CLINIC | Age: 31
End: 2021-05-05
Payer: COMMERCIAL

## 2021-05-05 VITALS
DIASTOLIC BLOOD PRESSURE: 60 MMHG | OXYGEN SATURATION: 99 % | SYSTOLIC BLOOD PRESSURE: 104 MMHG | WEIGHT: 112 LBS | BODY MASS INDEX: 20.48 KG/M2 | HEART RATE: 82 BPM

## 2021-05-05 DIAGNOSIS — E55.9 VITAMIN D DEFICIENCY: ICD-10-CM

## 2021-05-05 DIAGNOSIS — M79.7 FIBROMYALGIA: Primary | ICD-10-CM

## 2021-05-05 DIAGNOSIS — E61.1 IRON DEFICIENCY: ICD-10-CM

## 2021-05-05 PROCEDURE — 3008F BODY MASS INDEX DOCD: CPT | Mod: CPTII,S$GLB,, | Performed by: PHYSICIAN ASSISTANT

## 2021-05-05 PROCEDURE — 99214 PR OFFICE/OUTPT VISIT, EST, LEVL IV, 30-39 MIN: ICD-10-PCS | Mod: S$GLB,,, | Performed by: PHYSICIAN ASSISTANT

## 2021-05-05 PROCEDURE — 99999 PR PBB SHADOW E&M-EST. PATIENT-LVL III: CPT | Mod: PBBFAC,,, | Performed by: PHYSICIAN ASSISTANT

## 2021-05-05 PROCEDURE — 99999 PR PBB SHADOW E&M-EST. PATIENT-LVL III: ICD-10-PCS | Mod: PBBFAC,,, | Performed by: PHYSICIAN ASSISTANT

## 2021-05-05 PROCEDURE — 3008F PR BODY MASS INDEX (BMI) DOCUMENTED: ICD-10-PCS | Mod: CPTII,S$GLB,, | Performed by: PHYSICIAN ASSISTANT

## 2021-05-05 PROCEDURE — 99214 OFFICE O/P EST MOD 30 MIN: CPT | Mod: S$GLB,,, | Performed by: PHYSICIAN ASSISTANT

## 2021-05-19 ENCOUNTER — LAB VISIT (OUTPATIENT)
Dept: LAB | Facility: HOSPITAL | Age: 31
End: 2021-05-19
Attending: PHYSICIAN ASSISTANT
Payer: COMMERCIAL

## 2021-05-19 ENCOUNTER — PATIENT MESSAGE (OUTPATIENT)
Dept: FAMILY MEDICINE | Facility: CLINIC | Age: 31
End: 2021-05-19

## 2021-05-19 DIAGNOSIS — E61.1 IRON DEFICIENCY: ICD-10-CM

## 2021-05-19 DIAGNOSIS — E61.1 IRON DEFICIENCY: Primary | ICD-10-CM

## 2021-05-19 DIAGNOSIS — E55.9 VITAMIN D DEFICIENCY: ICD-10-CM

## 2021-05-19 LAB
BASOPHILS # BLD AUTO: 0.02 K/UL (ref 0–0.2)
BASOPHILS NFR BLD: 0.5 % (ref 0–1.9)
DIFFERENTIAL METHOD: ABNORMAL
EOSINOPHIL # BLD AUTO: 0 K/UL (ref 0–0.5)
EOSINOPHIL NFR BLD: 0.8 % (ref 0–8)
ERYTHROCYTE [DISTWIDTH] IN BLOOD BY AUTOMATED COUNT: 13.2 % (ref 11.5–14.5)
FERRITIN SERPL-MCNC: 10 NG/ML (ref 20–300)
HCT VFR BLD AUTO: 40 % (ref 37–48.5)
HGB BLD-MCNC: 13.2 G/DL (ref 12–16)
IMM GRANULOCYTES # BLD AUTO: 0 K/UL (ref 0–0.04)
IMM GRANULOCYTES NFR BLD AUTO: 0 % (ref 0–0.5)
IRON SERPL-MCNC: 61 UG/DL (ref 30–160)
LYMPHOCYTES # BLD AUTO: 1.8 K/UL (ref 1–4.8)
LYMPHOCYTES NFR BLD: 46.3 % (ref 18–48)
MCH RBC QN AUTO: 31.1 PG (ref 27–31)
MCHC RBC AUTO-ENTMCNC: 33 G/DL (ref 32–36)
MCV RBC AUTO: 94 FL (ref 82–98)
MONOCYTES # BLD AUTO: 0.3 K/UL (ref 0.3–1)
MONOCYTES NFR BLD: 7.3 % (ref 4–15)
NEUTROPHILS # BLD AUTO: 1.7 K/UL (ref 1.8–7.7)
NEUTROPHILS NFR BLD: 45.1 % (ref 38–73)
NRBC BLD-RTO: 0 /100 WBC
PLATELET # BLD AUTO: 307 K/UL (ref 150–450)
PMV BLD AUTO: 9.7 FL (ref 9.2–12.9)
RBC # BLD AUTO: 4.24 M/UL (ref 4–5.4)
SATURATED IRON: 15 % (ref 20–50)
TOTAL IRON BINDING CAPACITY: 419 UG/DL (ref 250–450)
TRANSFERRIN SERPL-MCNC: 283 MG/DL (ref 200–375)
WBC # BLD AUTO: 3.82 K/UL (ref 3.9–12.7)

## 2021-05-19 PROCEDURE — 85025 COMPLETE CBC W/AUTO DIFF WBC: CPT | Performed by: PHYSICIAN ASSISTANT

## 2021-05-19 PROCEDURE — 83540 ASSAY OF IRON: CPT | Performed by: PHYSICIAN ASSISTANT

## 2021-05-19 PROCEDURE — 82306 VITAMIN D 25 HYDROXY: CPT | Performed by: PHYSICIAN ASSISTANT

## 2021-05-19 PROCEDURE — 36415 COLL VENOUS BLD VENIPUNCTURE: CPT | Mod: PO | Performed by: PHYSICIAN ASSISTANT

## 2021-05-19 PROCEDURE — 82728 ASSAY OF FERRITIN: CPT | Performed by: PHYSICIAN ASSISTANT

## 2021-05-20 ENCOUNTER — PATIENT MESSAGE (OUTPATIENT)
Dept: FAMILY MEDICINE | Facility: CLINIC | Age: 31
End: 2021-05-20

## 2021-05-20 LAB — 25(OH)D3+25(OH)D2 SERPL-MCNC: 24 NG/ML (ref 30–96)

## 2021-05-26 PROBLEM — E61.1 IRON DEFICIENCY: Status: ACTIVE | Noted: 2021-05-26

## 2021-06-10 ENCOUNTER — PATIENT MESSAGE (OUTPATIENT)
Dept: FAMILY MEDICINE | Facility: CLINIC | Age: 31
End: 2021-06-10

## 2021-06-21 ENCOUNTER — PATIENT MESSAGE (OUTPATIENT)
Dept: OTOLARYNGOLOGY | Facility: CLINIC | Age: 31
End: 2021-06-21

## 2021-06-25 ENCOUNTER — OFFICE VISIT (OUTPATIENT)
Dept: OTOLARYNGOLOGY | Facility: CLINIC | Age: 31
End: 2021-06-25
Payer: COMMERCIAL

## 2021-06-25 DIAGNOSIS — J32.9 PURULENT POSTNASAL DRAINAGE: Primary | ICD-10-CM

## 2021-06-25 DIAGNOSIS — R09.A2 GLOBUS SENSATION: ICD-10-CM

## 2021-06-25 PROCEDURE — 99213 OFFICE O/P EST LOW 20 MIN: CPT | Mod: 95,,, | Performed by: PHYSICIAN ASSISTANT

## 2021-06-25 PROCEDURE — 99213 PR OFFICE/OUTPT VISIT, EST, LEVL III, 20-29 MIN: ICD-10-PCS | Mod: 95,,, | Performed by: PHYSICIAN ASSISTANT

## 2021-06-25 RX ORDER — CLINDAMYCIN HYDROCHLORIDE 300 MG/1
300 CAPSULE ORAL EVERY 8 HOURS
Qty: 30 CAPSULE | Refills: 0 | Status: SHIPPED | OUTPATIENT
Start: 2021-06-25 | End: 2021-07-05

## 2021-07-07 ENCOUNTER — LAB VISIT (OUTPATIENT)
Dept: LAB | Facility: HOSPITAL | Age: 31
End: 2021-07-07
Attending: PHYSICIAN ASSISTANT
Payer: COMMERCIAL

## 2021-07-07 ENCOUNTER — OFFICE VISIT (OUTPATIENT)
Dept: OTOLARYNGOLOGY | Facility: CLINIC | Age: 31
End: 2021-07-07
Payer: COMMERCIAL

## 2021-07-07 VITALS — WEIGHT: 115.06 LBS | BODY MASS INDEX: 21.17 KG/M2 | HEIGHT: 62 IN

## 2021-07-07 DIAGNOSIS — J32.8 OTHER CHRONIC SINUSITIS: ICD-10-CM

## 2021-07-07 DIAGNOSIS — R09.82 POST-NASAL DRAINAGE: Primary | ICD-10-CM

## 2021-07-07 DIAGNOSIS — E55.9 VITAMIN D DEFICIENCY: ICD-10-CM

## 2021-07-07 DIAGNOSIS — E61.1 IRON DEFICIENCY: Primary | ICD-10-CM

## 2021-07-07 DIAGNOSIS — R09.A2 GLOBUS SENSATION: ICD-10-CM

## 2021-07-07 LAB
ALBUMIN SERPL BCP-MCNC: 4 G/DL (ref 3.5–5.2)
ALP SERPL-CCNC: 43 U/L (ref 55–135)
ALT SERPL W/O P-5'-P-CCNC: 14 U/L (ref 10–44)
ANION GAP SERPL CALC-SCNC: 6 MMOL/L (ref 8–16)
AST SERPL-CCNC: 17 U/L (ref 10–40)
BASOPHILS # BLD AUTO: 0.02 K/UL (ref 0–0.2)
BASOPHILS NFR BLD: 0.4 % (ref 0–1.9)
BILIRUB SERPL-MCNC: 0.3 MG/DL (ref 0.1–1)
BUN SERPL-MCNC: 11 MG/DL (ref 6–20)
CALCIUM SERPL-MCNC: 9.9 MG/DL (ref 8.7–10.5)
CHLORIDE SERPL-SCNC: 105 MMOL/L (ref 95–110)
CO2 SERPL-SCNC: 29 MMOL/L (ref 23–29)
CREAT SERPL-MCNC: 0.8 MG/DL (ref 0.5–1.4)
DIFFERENTIAL METHOD: NORMAL
EOSINOPHIL # BLD AUTO: 0 K/UL (ref 0–0.5)
EOSINOPHIL NFR BLD: 0.8 % (ref 0–8)
ERYTHROCYTE [DISTWIDTH] IN BLOOD BY AUTOMATED COUNT: 13.9 % (ref 11.5–14.5)
EST. GFR  (AFRICAN AMERICAN): >60 ML/MIN/1.73 M^2
EST. GFR  (NON AFRICAN AMERICAN): >60 ML/MIN/1.73 M^2
FERRITIN SERPL-MCNC: 259 NG/ML (ref 20–300)
GLUCOSE SERPL-MCNC: 71 MG/DL (ref 70–110)
HCT VFR BLD AUTO: 40.1 % (ref 37–48.5)
HGB BLD-MCNC: 12.9 G/DL (ref 12–16)
IMM GRANULOCYTES # BLD AUTO: 0.02 K/UL (ref 0–0.04)
IMM GRANULOCYTES NFR BLD AUTO: 0.4 % (ref 0–0.5)
IRON SERPL-MCNC: 94 UG/DL (ref 30–160)
LYMPHOCYTES # BLD AUTO: 1.5 K/UL (ref 1–4.8)
LYMPHOCYTES NFR BLD: 27.4 % (ref 18–48)
MCH RBC QN AUTO: 31 PG (ref 27–31)
MCHC RBC AUTO-ENTMCNC: 32.2 G/DL (ref 32–36)
MCV RBC AUTO: 96 FL (ref 82–98)
MONOCYTES # BLD AUTO: 0.3 K/UL (ref 0.3–1)
MONOCYTES NFR BLD: 5.7 % (ref 4–15)
NEUTROPHILS # BLD AUTO: 3.5 K/UL (ref 1.8–7.7)
NEUTROPHILS NFR BLD: 65.3 % (ref 38–73)
NRBC BLD-RTO: 0 /100 WBC
PLATELET # BLD AUTO: 280 K/UL (ref 150–450)
PMV BLD AUTO: 9.2 FL (ref 9.2–12.9)
POTASSIUM SERPL-SCNC: 4.3 MMOL/L (ref 3.5–5.1)
PROT SERPL-MCNC: 7.4 G/DL (ref 6–8.4)
RBC # BLD AUTO: 4.16 M/UL (ref 4–5.4)
SATURATED IRON: 29 % (ref 20–50)
SODIUM SERPL-SCNC: 140 MMOL/L (ref 136–145)
TOTAL IRON BINDING CAPACITY: 321 UG/DL (ref 250–450)
TRANSFERRIN SERPL-MCNC: 217 MG/DL (ref 200–375)
WBC # BLD AUTO: 5.29 K/UL (ref 3.9–12.7)

## 2021-07-07 PROCEDURE — 83540 ASSAY OF IRON: CPT | Performed by: PHYSICIAN ASSISTANT

## 2021-07-07 PROCEDURE — 99214 PR OFFICE/OUTPT VISIT, EST, LEVL IV, 30-39 MIN: ICD-10-PCS | Mod: S$GLB,,, | Performed by: NURSE PRACTITIONER

## 2021-07-07 PROCEDURE — 99999 PR PBB SHADOW E&M-EST. PATIENT-LVL III: CPT | Mod: PBBFAC,,, | Performed by: NURSE PRACTITIONER

## 2021-07-07 PROCEDURE — 3008F PR BODY MASS INDEX (BMI) DOCUMENTED: ICD-10-PCS | Mod: CPTII,S$GLB,, | Performed by: NURSE PRACTITIONER

## 2021-07-07 PROCEDURE — 82728 ASSAY OF FERRITIN: CPT | Performed by: PHYSICIAN ASSISTANT

## 2021-07-07 PROCEDURE — 99999 PR PBB SHADOW E&M-EST. PATIENT-LVL III: ICD-10-PCS | Mod: PBBFAC,,, | Performed by: NURSE PRACTITIONER

## 2021-07-07 PROCEDURE — 99214 OFFICE O/P EST MOD 30 MIN: CPT | Mod: S$GLB,,, | Performed by: NURSE PRACTITIONER

## 2021-07-07 PROCEDURE — 80053 COMPREHEN METABOLIC PANEL: CPT | Performed by: PHYSICIAN ASSISTANT

## 2021-07-07 PROCEDURE — 1125F PR PAIN SEVERITY QUANTIFIED, PAIN PRESENT: ICD-10-PCS | Mod: S$GLB,,, | Performed by: NURSE PRACTITIONER

## 2021-07-07 PROCEDURE — 1125F AMNT PAIN NOTED PAIN PRSNT: CPT | Mod: S$GLB,,, | Performed by: NURSE PRACTITIONER

## 2021-07-07 PROCEDURE — 85025 COMPLETE CBC W/AUTO DIFF WBC: CPT | Performed by: PHYSICIAN ASSISTANT

## 2021-07-07 PROCEDURE — 3008F BODY MASS INDEX DOCD: CPT | Mod: CPTII,S$GLB,, | Performed by: NURSE PRACTITIONER

## 2021-07-07 PROCEDURE — 36415 COLL VENOUS BLD VENIPUNCTURE: CPT | Mod: PO | Performed by: PHYSICIAN ASSISTANT

## 2021-07-09 ENCOUNTER — HOSPITAL ENCOUNTER (OUTPATIENT)
Dept: RADIOLOGY | Facility: HOSPITAL | Age: 31
Discharge: HOME | End: 2021-07-09
Attending: NURSE PRACTITIONER
Payer: COMMERCIAL

## 2021-07-09 DIAGNOSIS — J32.8 OTHER CHRONIC SINUSITIS: ICD-10-CM

## 2021-07-09 PROCEDURE — 70486 CT MAXILLOFACIAL W/O DYE: CPT | Mod: 26,,, | Performed by: RADIOLOGY

## 2021-07-09 PROCEDURE — 70486 CT SINUSES WITHOUT CONTRAST: ICD-10-PCS | Mod: 26,,, | Performed by: RADIOLOGY

## 2021-07-09 PROCEDURE — 70486 CT MAXILLOFACIAL W/O DYE: CPT | Mod: TC,PO

## 2021-07-28 ENCOUNTER — IMMUNIZATION (OUTPATIENT)
Dept: FAMILY MEDICINE | Facility: CLINIC | Age: 31
End: 2021-07-28
Payer: COMMERCIAL

## 2021-07-28 DIAGNOSIS — Z23 NEED FOR VACCINATION: Primary | ICD-10-CM

## 2021-07-28 PROCEDURE — 91300 COVID-19, MRNA, LNP-S, PF, 30 MCG/0.3 ML DOSE VACCINE: CPT | Mod: PBBFAC | Performed by: FAMILY MEDICINE

## 2021-08-05 ENCOUNTER — OFFICE VISIT (OUTPATIENT)
Dept: FAMILY MEDICINE | Facility: CLINIC | Age: 31
End: 2021-08-05
Payer: COMMERCIAL

## 2021-08-05 VITALS
HEART RATE: 75 BPM | WEIGHT: 114.63 LBS | TEMPERATURE: 98 F | HEIGHT: 62 IN | SYSTOLIC BLOOD PRESSURE: 90 MMHG | OXYGEN SATURATION: 99 % | BODY MASS INDEX: 21.1 KG/M2 | DIASTOLIC BLOOD PRESSURE: 54 MMHG

## 2021-08-05 DIAGNOSIS — E61.1 IRON DEFICIENCY: ICD-10-CM

## 2021-08-05 DIAGNOSIS — M79.7 FIBROMYALGIA: Primary | ICD-10-CM

## 2021-08-05 PROCEDURE — 1160F PR REVIEW ALL MEDS BY PRESCRIBER/CLIN PHARMACIST DOCUMENTED: ICD-10-PCS | Mod: CPTII,S$GLB,, | Performed by: INTERNAL MEDICINE

## 2021-08-05 PROCEDURE — 99999 PR PBB SHADOW E&M-EST. PATIENT-LVL III: CPT | Mod: PBBFAC,,, | Performed by: INTERNAL MEDICINE

## 2021-08-05 PROCEDURE — 3074F SYST BP LT 130 MM HG: CPT | Mod: CPTII,S$GLB,, | Performed by: INTERNAL MEDICINE

## 2021-08-05 PROCEDURE — 1160F RVW MEDS BY RX/DR IN RCRD: CPT | Mod: CPTII,S$GLB,, | Performed by: INTERNAL MEDICINE

## 2021-08-05 PROCEDURE — 99214 OFFICE O/P EST MOD 30 MIN: CPT | Mod: S$GLB,,, | Performed by: INTERNAL MEDICINE

## 2021-08-05 PROCEDURE — 1126F AMNT PAIN NOTED NONE PRSNT: CPT | Mod: CPTII,S$GLB,, | Performed by: INTERNAL MEDICINE

## 2021-08-05 PROCEDURE — 3078F DIAST BP <80 MM HG: CPT | Mod: CPTII,S$GLB,, | Performed by: INTERNAL MEDICINE

## 2021-08-05 PROCEDURE — 3008F PR BODY MASS INDEX (BMI) DOCUMENTED: ICD-10-PCS | Mod: CPTII,S$GLB,, | Performed by: INTERNAL MEDICINE

## 2021-08-05 PROCEDURE — 3074F PR MOST RECENT SYSTOLIC BLOOD PRESSURE < 130 MM HG: ICD-10-PCS | Mod: CPTII,S$GLB,, | Performed by: INTERNAL MEDICINE

## 2021-08-05 PROCEDURE — 99999 PR PBB SHADOW E&M-EST. PATIENT-LVL III: ICD-10-PCS | Mod: PBBFAC,,, | Performed by: INTERNAL MEDICINE

## 2021-08-05 PROCEDURE — 3008F BODY MASS INDEX DOCD: CPT | Mod: CPTII,S$GLB,, | Performed by: INTERNAL MEDICINE

## 2021-08-05 PROCEDURE — 3078F PR MOST RECENT DIASTOLIC BLOOD PRESSURE < 80 MM HG: ICD-10-PCS | Mod: CPTII,S$GLB,, | Performed by: INTERNAL MEDICINE

## 2021-08-05 PROCEDURE — 99214 PR OFFICE/OUTPT VISIT, EST, LEVL IV, 30-39 MIN: ICD-10-PCS | Mod: S$GLB,,, | Performed by: INTERNAL MEDICINE

## 2021-08-05 PROCEDURE — 1126F PR PAIN SEVERITY QUANTIFIED, NO PAIN PRESENT: ICD-10-PCS | Mod: CPTII,S$GLB,, | Performed by: INTERNAL MEDICINE

## 2021-08-05 PROCEDURE — 1159F MED LIST DOCD IN RCRD: CPT | Mod: CPTII,S$GLB,, | Performed by: INTERNAL MEDICINE

## 2021-08-05 PROCEDURE — 1159F PR MEDICATION LIST DOCUMENTED IN MEDICAL RECORD: ICD-10-PCS | Mod: CPTII,S$GLB,, | Performed by: INTERNAL MEDICINE

## 2021-08-05 RX ORDER — TRAZODONE HYDROCHLORIDE 50 MG/1
50 TABLET ORAL NIGHTLY
Qty: 30 TABLET | Refills: 11 | Status: SHIPPED | OUTPATIENT
Start: 2021-08-05 | End: 2022-06-13

## 2021-08-05 RX ORDER — DULOXETIN HYDROCHLORIDE 30 MG/1
30 CAPSULE, DELAYED RELEASE ORAL DAILY
Qty: 30 CAPSULE | Refills: 11 | Status: SHIPPED | OUTPATIENT
Start: 2021-08-05 | End: 2022-02-23

## 2021-08-18 ENCOUNTER — IMMUNIZATION (OUTPATIENT)
Dept: FAMILY MEDICINE | Facility: CLINIC | Age: 31
End: 2021-08-18
Payer: COMMERCIAL

## 2021-08-18 DIAGNOSIS — Z23 NEED FOR VACCINATION: Primary | ICD-10-CM

## 2021-08-18 PROCEDURE — 0002A COVID-19, MRNA, LNP-S, PF, 30 MCG/0.3 ML DOSE VACCINE: ICD-10-PCS | Mod: CV19,,, | Performed by: FAMILY MEDICINE

## 2021-08-18 PROCEDURE — 91300 COVID-19, MRNA, LNP-S, PF, 30 MCG/0.3 ML DOSE VACCINE: ICD-10-PCS | Mod: ,,, | Performed by: FAMILY MEDICINE

## 2021-08-18 PROCEDURE — 91300 COVID-19, MRNA, LNP-S, PF, 30 MCG/0.3 ML DOSE VACCINE: CPT | Mod: ,,, | Performed by: FAMILY MEDICINE

## 2021-08-18 PROCEDURE — 0002A COVID-19, MRNA, LNP-S, PF, 30 MCG/0.3 ML DOSE VACCINE: CPT | Mod: CV19,,, | Performed by: FAMILY MEDICINE

## 2021-09-15 ENCOUNTER — OFFICE VISIT (OUTPATIENT)
Dept: FAMILY MEDICINE | Facility: CLINIC | Age: 31
End: 2021-09-15
Payer: COMMERCIAL

## 2021-09-15 ENCOUNTER — PATIENT MESSAGE (OUTPATIENT)
Dept: FAMILY MEDICINE | Facility: CLINIC | Age: 31
End: 2021-09-15

## 2021-09-15 ENCOUNTER — LAB VISIT (OUTPATIENT)
Dept: LAB | Facility: HOSPITAL | Age: 31
End: 2021-09-15
Attending: PHYSICIAN ASSISTANT
Payer: COMMERCIAL

## 2021-09-15 VITALS
TEMPERATURE: 99 F | DIASTOLIC BLOOD PRESSURE: 64 MMHG | BODY MASS INDEX: 20.6 KG/M2 | OXYGEN SATURATION: 98 % | WEIGHT: 112.63 LBS | HEART RATE: 99 BPM | SYSTOLIC BLOOD PRESSURE: 100 MMHG

## 2021-09-15 DIAGNOSIS — R11.0 NAUSEA: ICD-10-CM

## 2021-09-15 DIAGNOSIS — K21.9 GASTROESOPHAGEAL REFLUX DISEASE, UNSPECIFIED WHETHER ESOPHAGITIS PRESENT: Primary | ICD-10-CM

## 2021-09-15 DIAGNOSIS — M79.7 FIBROMYALGIA: ICD-10-CM

## 2021-09-15 DIAGNOSIS — R30.0 DYSURIA: ICD-10-CM

## 2021-09-15 LAB
ALBUMIN SERPL BCP-MCNC: 4.2 G/DL (ref 3.5–5.2)
ALP SERPL-CCNC: 35 U/L (ref 55–135)
ALT SERPL W/O P-5'-P-CCNC: 10 U/L (ref 10–44)
ANION GAP SERPL CALC-SCNC: 11 MMOL/L (ref 8–16)
AST SERPL-CCNC: 17 U/L (ref 10–40)
BACTERIA #/AREA URNS HPF: ABNORMAL /HPF
BASOPHILS # BLD AUTO: 0.02 K/UL (ref 0–0.2)
BASOPHILS NFR BLD: 0.5 % (ref 0–1.9)
BILIRUB SERPL-MCNC: 0.4 MG/DL (ref 0.1–1)
BILIRUB UR QL STRIP: ABNORMAL
BUN SERPL-MCNC: 11 MG/DL (ref 6–20)
CALCIUM SERPL-MCNC: 9.6 MG/DL (ref 8.7–10.5)
CHLORIDE SERPL-SCNC: 99 MMOL/L (ref 95–110)
CLARITY UR: CLEAR
CO2 SERPL-SCNC: 22 MMOL/L (ref 23–29)
COLOR UR: YELLOW
CREAT SERPL-MCNC: 0.9 MG/DL (ref 0.5–1.4)
DIFFERENTIAL METHOD: ABNORMAL
EOSINOPHIL # BLD AUTO: 0 K/UL (ref 0–0.5)
EOSINOPHIL NFR BLD: 0.3 % (ref 0–8)
ERYTHROCYTE [DISTWIDTH] IN BLOOD BY AUTOMATED COUNT: 12.3 % (ref 11.5–14.5)
EST. GFR  (AFRICAN AMERICAN): >60 ML/MIN/1.73 M^2
EST. GFR  (NON AFRICAN AMERICAN): >60 ML/MIN/1.73 M^2
GLUCOSE SERPL-MCNC: 90 MG/DL (ref 70–110)
GLUCOSE UR QL STRIP: NEGATIVE
HCT VFR BLD AUTO: 45.5 % (ref 37–48.5)
HGB BLD-MCNC: 15.3 G/DL (ref 12–16)
HGB UR QL STRIP: ABNORMAL
HYALINE CASTS #/AREA URNS LPF: 1 /LPF
IMM GRANULOCYTES # BLD AUTO: 0.01 K/UL (ref 0–0.04)
IMM GRANULOCYTES NFR BLD AUTO: 0.3 % (ref 0–0.5)
KETONES UR QL STRIP: ABNORMAL
LEUKOCYTE ESTERASE UR QL STRIP: ABNORMAL
LYMPHOCYTES # BLD AUTO: 1.1 K/UL (ref 1–4.8)
LYMPHOCYTES NFR BLD: 30.1 % (ref 18–48)
MCH RBC QN AUTO: 31.4 PG (ref 27–31)
MCHC RBC AUTO-ENTMCNC: 33.6 G/DL (ref 32–36)
MCV RBC AUTO: 93 FL (ref 82–98)
MICROSCOPIC COMMENT: ABNORMAL
MONOCYTES # BLD AUTO: 0.3 K/UL (ref 0.3–1)
MONOCYTES NFR BLD: 8.8 % (ref 4–15)
NEUTROPHILS # BLD AUTO: 2.2 K/UL (ref 1.8–7.7)
NEUTROPHILS NFR BLD: 60 % (ref 38–73)
NITRITE UR QL STRIP: NEGATIVE
NRBC BLD-RTO: 0 /100 WBC
PH UR STRIP: 6 [PH] (ref 5–8)
PLATELET # BLD AUTO: 223 K/UL (ref 150–450)
PMV BLD AUTO: 9.3 FL (ref 9.2–12.9)
POTASSIUM SERPL-SCNC: 3.9 MMOL/L (ref 3.5–5.1)
PROT SERPL-MCNC: 7.5 G/DL (ref 6–8.4)
PROT UR QL STRIP: ABNORMAL
RBC # BLD AUTO: 4.87 M/UL (ref 4–5.4)
RBC #/AREA URNS HPF: 1 /HPF (ref 0–4)
SODIUM SERPL-SCNC: 132 MMOL/L (ref 136–145)
SP GR UR STRIP: >=1.03 (ref 1–1.03)
SQUAMOUS #/AREA URNS HPF: 6 /HPF
URN SPEC COLLECT METH UR: ABNORMAL
WBC # BLD AUTO: 3.65 K/UL (ref 3.9–12.7)
WBC #/AREA URNS HPF: 8 /HPF (ref 0–5)

## 2021-09-15 PROCEDURE — 1160F RVW MEDS BY RX/DR IN RCRD: CPT | Mod: CPTII,S$GLB,, | Performed by: PHYSICIAN ASSISTANT

## 2021-09-15 PROCEDURE — 1160F PR REVIEW ALL MEDS BY PRESCRIBER/CLIN PHARMACIST DOCUMENTED: ICD-10-PCS | Mod: CPTII,S$GLB,, | Performed by: PHYSICIAN ASSISTANT

## 2021-09-15 PROCEDURE — 3078F PR MOST RECENT DIASTOLIC BLOOD PRESSURE < 80 MM HG: ICD-10-PCS | Mod: CPTII,S$GLB,, | Performed by: PHYSICIAN ASSISTANT

## 2021-09-15 PROCEDURE — 3008F PR BODY MASS INDEX (BMI) DOCUMENTED: ICD-10-PCS | Mod: CPTII,S$GLB,, | Performed by: PHYSICIAN ASSISTANT

## 2021-09-15 PROCEDURE — 99999 PR PBB SHADOW E&M-EST. PATIENT-LVL III: CPT | Mod: PBBFAC,,, | Performed by: PHYSICIAN ASSISTANT

## 2021-09-15 PROCEDURE — 3074F PR MOST RECENT SYSTOLIC BLOOD PRESSURE < 130 MM HG: ICD-10-PCS | Mod: CPTII,S$GLB,, | Performed by: PHYSICIAN ASSISTANT

## 2021-09-15 PROCEDURE — 99214 PR OFFICE/OUTPT VISIT, EST, LEVL IV, 30-39 MIN: ICD-10-PCS | Mod: S$GLB,,, | Performed by: PHYSICIAN ASSISTANT

## 2021-09-15 PROCEDURE — 81000 URINALYSIS NONAUTO W/SCOPE: CPT | Mod: PO | Performed by: PHYSICIAN ASSISTANT

## 2021-09-15 PROCEDURE — 80053 COMPREHEN METABOLIC PANEL: CPT | Performed by: PHYSICIAN ASSISTANT

## 2021-09-15 PROCEDURE — 1159F MED LIST DOCD IN RCRD: CPT | Mod: CPTII,S$GLB,, | Performed by: PHYSICIAN ASSISTANT

## 2021-09-15 PROCEDURE — 1159F PR MEDICATION LIST DOCUMENTED IN MEDICAL RECORD: ICD-10-PCS | Mod: CPTII,S$GLB,, | Performed by: PHYSICIAN ASSISTANT

## 2021-09-15 PROCEDURE — 36415 COLL VENOUS BLD VENIPUNCTURE: CPT | Mod: PO | Performed by: PHYSICIAN ASSISTANT

## 2021-09-15 PROCEDURE — 3074F SYST BP LT 130 MM HG: CPT | Mod: CPTII,S$GLB,, | Performed by: PHYSICIAN ASSISTANT

## 2021-09-15 PROCEDURE — 3008F BODY MASS INDEX DOCD: CPT | Mod: CPTII,S$GLB,, | Performed by: PHYSICIAN ASSISTANT

## 2021-09-15 PROCEDURE — 99999 PR PBB SHADOW E&M-EST. PATIENT-LVL III: ICD-10-PCS | Mod: PBBFAC,,, | Performed by: PHYSICIAN ASSISTANT

## 2021-09-15 PROCEDURE — 99214 OFFICE O/P EST MOD 30 MIN: CPT | Mod: S$GLB,,, | Performed by: PHYSICIAN ASSISTANT

## 2021-09-15 PROCEDURE — 85025 COMPLETE CBC W/AUTO DIFF WBC: CPT | Performed by: PHYSICIAN ASSISTANT

## 2021-09-15 PROCEDURE — 3078F DIAST BP <80 MM HG: CPT | Mod: CPTII,S$GLB,, | Performed by: PHYSICIAN ASSISTANT

## 2021-09-15 RX ORDER — SUCRALFATE 1 G/1
1 TABLET ORAL 2 TIMES DAILY
Qty: 10 TABLET | Refills: 0 | Status: SHIPPED | OUTPATIENT
Start: 2021-09-15 | End: 2021-09-20

## 2021-09-15 RX ORDER — ONDANSETRON 4 MG/1
4 TABLET, ORALLY DISINTEGRATING ORAL EVERY 12 HOURS PRN
Qty: 14 TABLET | Refills: 0 | Status: SHIPPED | OUTPATIENT
Start: 2021-09-15 | End: 2021-09-22

## 2021-09-15 RX ORDER — PANTOPRAZOLE SODIUM 40 MG/1
40 TABLET, DELAYED RELEASE ORAL DAILY
Qty: 30 TABLET | Refills: 0 | Status: SHIPPED | OUTPATIENT
Start: 2021-09-15 | End: 2022-02-23

## 2021-09-15 RX ORDER — NITROFURANTOIN 25; 75 MG/1; MG/1
100 CAPSULE ORAL 2 TIMES DAILY
Qty: 10 CAPSULE | Refills: 0 | Status: SHIPPED | OUTPATIENT
Start: 2021-09-15 | End: 2021-09-20 | Stop reason: ALTCHOICE

## 2021-09-16 ENCOUNTER — PATIENT MESSAGE (OUTPATIENT)
Dept: FAMILY MEDICINE | Facility: CLINIC | Age: 31
End: 2021-09-16

## 2021-09-16 DIAGNOSIS — E87.1 HYPONATREMIA: Primary | ICD-10-CM

## 2021-09-17 ENCOUNTER — PATIENT MESSAGE (OUTPATIENT)
Dept: FAMILY MEDICINE | Facility: CLINIC | Age: 31
End: 2021-09-17

## 2021-09-20 ENCOUNTER — TELEPHONE (OUTPATIENT)
Dept: FAMILY MEDICINE | Facility: CLINIC | Age: 31
End: 2021-09-20

## 2021-09-20 ENCOUNTER — PATIENT MESSAGE (OUTPATIENT)
Dept: FAMILY MEDICINE | Facility: CLINIC | Age: 31
End: 2021-09-20

## 2021-09-20 DIAGNOSIS — N30.00 ACUTE CYSTITIS WITHOUT HEMATURIA: Primary | ICD-10-CM

## 2021-09-20 RX ORDER — SULFAMETHOXAZOLE AND TRIMETHOPRIM 800; 160 MG/1; MG/1
1 TABLET ORAL 2 TIMES DAILY
Qty: 20 TABLET | Refills: 0 | Status: SHIPPED | OUTPATIENT
Start: 2021-09-20 | End: 2021-09-30

## 2021-09-21 ENCOUNTER — PATIENT MESSAGE (OUTPATIENT)
Dept: FAMILY MEDICINE | Facility: CLINIC | Age: 31
End: 2021-09-21

## 2021-10-25 ENCOUNTER — LAB VISIT (OUTPATIENT)
Dept: LAB | Facility: HOSPITAL | Age: 31
End: 2021-10-25
Attending: PHYSICIAN ASSISTANT
Payer: COMMERCIAL

## 2021-10-25 DIAGNOSIS — E61.1 IRON DEFICIENCY: Primary | ICD-10-CM

## 2021-10-25 LAB
ALBUMIN SERPL BCP-MCNC: 4.1 G/DL (ref 3.5–5.2)
ALP SERPL-CCNC: 31 U/L (ref 55–135)
ALT SERPL W/O P-5'-P-CCNC: 9 U/L (ref 10–44)
ANION GAP SERPL CALC-SCNC: 9 MMOL/L (ref 8–16)
AST SERPL-CCNC: 14 U/L (ref 10–40)
BASOPHILS # BLD AUTO: 0.02 K/UL (ref 0–0.2)
BASOPHILS NFR BLD: 0.5 % (ref 0–1.9)
BILIRUB SERPL-MCNC: 0.5 MG/DL (ref 0.1–1)
BUN SERPL-MCNC: 7 MG/DL (ref 6–20)
CALCIUM SERPL-MCNC: 10 MG/DL (ref 8.7–10.5)
CHLORIDE SERPL-SCNC: 106 MMOL/L (ref 95–110)
CO2 SERPL-SCNC: 25 MMOL/L (ref 23–29)
CREAT SERPL-MCNC: 0.8 MG/DL (ref 0.5–1.4)
DIFFERENTIAL METHOD: ABNORMAL
EOSINOPHIL # BLD AUTO: 0 K/UL (ref 0–0.5)
EOSINOPHIL NFR BLD: 1.1 % (ref 0–8)
ERYTHROCYTE [DISTWIDTH] IN BLOOD BY AUTOMATED COUNT: 12.2 % (ref 11.5–14.5)
EST. GFR  (AFRICAN AMERICAN): >60 ML/MIN/1.73 M^2
EST. GFR  (NON AFRICAN AMERICAN): >60 ML/MIN/1.73 M^2
FERRITIN SERPL-MCNC: 143 NG/ML (ref 20–300)
GLUCOSE SERPL-MCNC: 89 MG/DL (ref 70–110)
HCT VFR BLD AUTO: 40.8 % (ref 37–48.5)
HGB BLD-MCNC: 13.3 G/DL (ref 12–16)
IMM GRANULOCYTES # BLD AUTO: 0.01 K/UL (ref 0–0.04)
IMM GRANULOCYTES NFR BLD AUTO: 0.3 % (ref 0–0.5)
IRON SERPL-MCNC: 103 UG/DL (ref 30–160)
LYMPHOCYTES # BLD AUTO: 1.5 K/UL (ref 1–4.8)
LYMPHOCYTES NFR BLD: 40.8 % (ref 18–48)
MCH RBC QN AUTO: 31.9 PG (ref 27–31)
MCHC RBC AUTO-ENTMCNC: 32.6 G/DL (ref 32–36)
MCV RBC AUTO: 98 FL (ref 82–98)
MONOCYTES # BLD AUTO: 0.3 K/UL (ref 0.3–1)
MONOCYTES NFR BLD: 8.2 % (ref 4–15)
NEUTROPHILS # BLD AUTO: 1.8 K/UL (ref 1.8–7.7)
NEUTROPHILS NFR BLD: 49.1 % (ref 38–73)
NRBC BLD-RTO: 0 /100 WBC
PLATELET # BLD AUTO: 272 K/UL (ref 150–450)
PMV BLD AUTO: 9.4 FL (ref 9.2–12.9)
POTASSIUM SERPL-SCNC: 4.2 MMOL/L (ref 3.5–5.1)
PROT SERPL-MCNC: 6.8 G/DL (ref 6–8.4)
RBC # BLD AUTO: 4.17 M/UL (ref 4–5.4)
SATURATED IRON: 30 % (ref 20–50)
SODIUM SERPL-SCNC: 140 MMOL/L (ref 136–145)
TOTAL IRON BINDING CAPACITY: 339 UG/DL (ref 250–450)
TRANSFERRIN SERPL-MCNC: 229 MG/DL (ref 200–375)
WBC # BLD AUTO: 3.65 K/UL (ref 3.9–12.7)

## 2021-10-25 PROCEDURE — 84466 ASSAY OF TRANSFERRIN: CPT | Performed by: PHYSICIAN ASSISTANT

## 2021-10-25 PROCEDURE — 82728 ASSAY OF FERRITIN: CPT | Performed by: PHYSICIAN ASSISTANT

## 2021-10-25 PROCEDURE — 80053 COMPREHEN METABOLIC PANEL: CPT | Performed by: PHYSICIAN ASSISTANT

## 2021-10-25 PROCEDURE — 36415 COLL VENOUS BLD VENIPUNCTURE: CPT | Mod: PO | Performed by: PHYSICIAN ASSISTANT

## 2021-10-25 PROCEDURE — 85025 COMPLETE CBC W/AUTO DIFF WBC: CPT | Performed by: PHYSICIAN ASSISTANT

## 2022-02-22 ENCOUNTER — PATIENT MESSAGE (OUTPATIENT)
Dept: FAMILY MEDICINE | Facility: CLINIC | Age: 32
End: 2022-02-22
Payer: COMMERCIAL

## 2022-02-23 ENCOUNTER — OFFICE VISIT (OUTPATIENT)
Dept: FAMILY MEDICINE | Facility: CLINIC | Age: 32
End: 2022-02-23
Payer: COMMERCIAL

## 2022-02-23 DIAGNOSIS — G93.31 POSTVIRAL FATIGUE SYNDROME: ICD-10-CM

## 2022-02-23 DIAGNOSIS — Z86.16 HISTORY OF COVID-19: Primary | ICD-10-CM

## 2022-02-23 PROCEDURE — 99214 PR OFFICE/OUTPT VISIT, EST, LEVL IV, 30-39 MIN: ICD-10-PCS | Mod: 95,,, | Performed by: PHYSICIAN ASSISTANT

## 2022-02-23 PROCEDURE — 99214 OFFICE O/P EST MOD 30 MIN: CPT | Mod: 95,,, | Performed by: PHYSICIAN ASSISTANT

## 2022-02-23 PROCEDURE — 1160F PR REVIEW ALL MEDS BY PRESCRIBER/CLIN PHARMACIST DOCUMENTED: ICD-10-PCS | Mod: CPTII,95,, | Performed by: PHYSICIAN ASSISTANT

## 2022-02-23 PROCEDURE — 1160F RVW MEDS BY RX/DR IN RCRD: CPT | Mod: CPTII,95,, | Performed by: PHYSICIAN ASSISTANT

## 2022-02-23 PROCEDURE — 1159F MED LIST DOCD IN RCRD: CPT | Mod: CPTII,95,, | Performed by: PHYSICIAN ASSISTANT

## 2022-02-23 PROCEDURE — 1159F PR MEDICATION LIST DOCUMENTED IN MEDICAL RECORD: ICD-10-PCS | Mod: CPTII,95,, | Performed by: PHYSICIAN ASSISTANT

## 2022-02-23 NOTE — PROGRESS NOTES
Subjective:       Patient ID: Cheryl Peter is a 31 y.o. female.    The patient location is: [place], [place]  The chief complaint leading to consultation is: post covid symptoms    Visit type: audiovisual    Face to Face time with patient: 20  minutes of total time spent on the encounter, which includes face to face time and non-face to face time preparing to see the patient (eg, review of tests), Obtaining and/or reviewing separately obtained history, Documenting clinical information in the electronic or other health record, Independently interpreting results (not separately reported) and communicating results to the patient/family/caregiver, or Care coordination (not separately reported).       Each patient to whom he or she provides medical services by telemedicine is:  (1) informed of the relationship between the physician and patient and the respective role of any other health care provider with respect to management of the patient; and (2) notified that he or she may decline to receive medical services by telemedicine and may withdraw from such care at any time.    Notes:       Chief Complaint: post covid symptoms    HPI'    Pt is known to me, PCP Dr. Pepper.     Pt is a 31 year old female with fibromyalgia and ABA. She presents today complaining of post-covid symptoms. Tested positive for covid 1 week ago. Initial symptoms were nasal congestion, body aches, lightheadedness, fever, cough, headache. Now, pt states that her chest feels like she has to make more of an effort to breath. Reports some shortness of breath with exertion. If she rests, this resolves. Overall, she just feels tired and a sense of generalized weakness.     Review of Systems   Constitutional: Negative for fever.   HENT: Positive for postnasal drip and sore throat (scratchy). Negative for nasal congestion, ear pain, rhinorrhea, sinus pressure/congestion and trouble swallowing.    Respiratory: Positive for cough and shortness of  breath (with activity). Negative for chest tightness and wheezing.    Cardiovascular: Negative for chest pain, palpitations and leg swelling.   Gastrointestinal: Negative for abdominal pain, diarrhea, nausea, rectal pain, vomiting and reflux.   Genitourinary: Negative for difficulty urinating, dysuria, flank pain, frequency and urgency.   Musculoskeletal: Positive for myalgias. Negative for arthralgias, leg pain and neck pain.   Integumentary:  Negative for rash.   Neurological: Positive for weakness (generalized). Negative for dizziness, vertigo, tremors, syncope, light-headedness and headaches.         Objective:      Physical Exam  Constitutional:       General: She is not in acute distress.     Appearance: Normal appearance. She is not ill-appearing, toxic-appearing or diaphoretic.   HENT:      Head: Normocephalic and atraumatic.   Pulmonary:      Effort: No respiratory distress.   Neurological:      General: No focal deficit present.      Mental Status: She is alert and oriented to person, place, and time.   Psychiatric:         Mood and Affect: Mood normal.         Behavior: Behavior normal.         Thought Content: Thought content normal.         Judgment: Judgment normal.         Full exam was not able to be performed today, as this was a virtual visit.     Assessment:       Problem List Items Addressed This Visit    None     Visit Diagnoses     History of COVID-19    -  Primary    Postviral fatigue syndrome              Plan:           1. History of COVID-19    2. Postviral fatigue syndrome      -explained that muscle fatigue and aching is expected post covid, along with mild exertional dyspnea. Will improve with time. Encouraged increasing activity as tolerated, increased hydration, multivitamin, eating healthy.     RTC/ER precautions given. F/U if symptoms persist or worsen

## 2022-02-23 NOTE — PATIENT INSTRUCTIONS
A few reminders from today:    Try once daily flonase nasal spray  Warm salt water gargles 3x daily  Purchase pulse ox to monitor oxygen saturation  Eat healthy, stay hydrated, activity as tolerated    Follow up with me if needed.   Please go to ER/urgent care if after hours or symptoms persist/worsen.     Do not hesitate to get in touch with me should you have any further questions.     Thank you for trusting me with your care!  I wish you health and happiness.    -Mercedes Reyes PA-C

## 2022-03-09 ENCOUNTER — LAB VISIT (OUTPATIENT)
Dept: LAB | Facility: HOSPITAL | Age: 32
End: 2022-03-09
Attending: PHYSICIAN ASSISTANT
Payer: COMMERCIAL

## 2022-03-09 DIAGNOSIS — E61.1 IRON DEFICIENCY: Primary | ICD-10-CM

## 2022-03-09 LAB
ALBUMIN SERPL BCP-MCNC: 4.1 G/DL (ref 3.5–5.2)
ALP SERPL-CCNC: 35 U/L (ref 55–135)
ALT SERPL W/O P-5'-P-CCNC: 8 U/L (ref 10–44)
ANION GAP SERPL CALC-SCNC: 7 MMOL/L (ref 8–16)
AST SERPL-CCNC: 13 U/L (ref 10–40)
BASOPHILS # BLD AUTO: 0.03 K/UL (ref 0–0.2)
BASOPHILS NFR BLD: 0.5 % (ref 0–1.9)
BILIRUB SERPL-MCNC: 0.2 MG/DL (ref 0.1–1)
BUN SERPL-MCNC: 8 MG/DL (ref 6–20)
CALCIUM SERPL-MCNC: 9.8 MG/DL (ref 8.7–10.5)
CHLORIDE SERPL-SCNC: 101 MMOL/L (ref 95–110)
CO2 SERPL-SCNC: 27 MMOL/L (ref 23–29)
CREAT SERPL-MCNC: 0.7 MG/DL (ref 0.5–1.4)
DIFFERENTIAL METHOD: ABNORMAL
EOSINOPHIL # BLD AUTO: 0 K/UL (ref 0–0.5)
EOSINOPHIL NFR BLD: 0.3 % (ref 0–8)
ERYTHROCYTE [DISTWIDTH] IN BLOOD BY AUTOMATED COUNT: 11.9 % (ref 11.5–14.5)
EST. GFR  (AFRICAN AMERICAN): >60 ML/MIN/1.73 M^2
EST. GFR  (NON AFRICAN AMERICAN): >60 ML/MIN/1.73 M^2
FERRITIN SERPL-MCNC: 133 NG/ML (ref 20–300)
GLUCOSE SERPL-MCNC: 81 MG/DL (ref 70–110)
HCT VFR BLD AUTO: 40 % (ref 37–48.5)
HGB BLD-MCNC: 13.1 G/DL (ref 12–16)
IMM GRANULOCYTES # BLD AUTO: 0.02 K/UL (ref 0–0.04)
IMM GRANULOCYTES NFR BLD AUTO: 0.3 % (ref 0–0.5)
IRON SERPL-MCNC: 69 UG/DL (ref 30–160)
LYMPHOCYTES # BLD AUTO: 2.3 K/UL (ref 1–4.8)
LYMPHOCYTES NFR BLD: 37.2 % (ref 18–48)
MCH RBC QN AUTO: 31.3 PG (ref 27–31)
MCHC RBC AUTO-ENTMCNC: 32.8 G/DL (ref 32–36)
MCV RBC AUTO: 96 FL (ref 82–98)
MONOCYTES # BLD AUTO: 0.4 K/UL (ref 0.3–1)
MONOCYTES NFR BLD: 6.2 % (ref 4–15)
NEUTROPHILS # BLD AUTO: 3.5 K/UL (ref 1.8–7.7)
NEUTROPHILS NFR BLD: 55.5 % (ref 38–73)
NRBC BLD-RTO: 0 /100 WBC
PLATELET # BLD AUTO: 292 K/UL (ref 150–450)
PMV BLD AUTO: 9.4 FL (ref 9.2–12.9)
POTASSIUM SERPL-SCNC: 4.2 MMOL/L (ref 3.5–5.1)
PROT SERPL-MCNC: 7 G/DL (ref 6–8.4)
RBC # BLD AUTO: 4.18 M/UL (ref 4–5.4)
SATURATED IRON: 20 % (ref 20–50)
SODIUM SERPL-SCNC: 135 MMOL/L (ref 136–145)
TOTAL IRON BINDING CAPACITY: 337 UG/DL (ref 250–450)
TRANSFERRIN SERPL-MCNC: 228 MG/DL (ref 200–375)
WBC # BLD AUTO: 6.29 K/UL (ref 3.9–12.7)

## 2022-03-09 PROCEDURE — 82728 ASSAY OF FERRITIN: CPT | Performed by: PHYSICIAN ASSISTANT

## 2022-03-09 PROCEDURE — 80053 COMPREHEN METABOLIC PANEL: CPT | Performed by: PHYSICIAN ASSISTANT

## 2022-03-09 PROCEDURE — 36415 COLL VENOUS BLD VENIPUNCTURE: CPT | Mod: PO | Performed by: PHYSICIAN ASSISTANT

## 2022-03-09 PROCEDURE — 85025 COMPLETE CBC W/AUTO DIFF WBC: CPT | Performed by: PHYSICIAN ASSISTANT

## 2022-03-09 PROCEDURE — 84466 ASSAY OF TRANSFERRIN: CPT | Performed by: PHYSICIAN ASSISTANT

## 2022-04-05 ENCOUNTER — PATIENT MESSAGE (OUTPATIENT)
Dept: ADMINISTRATIVE | Facility: HOSPITAL | Age: 32
End: 2022-04-05
Payer: COMMERCIAL

## 2022-04-05 ENCOUNTER — PATIENT OUTREACH (OUTPATIENT)
Dept: ADMINISTRATIVE | Facility: HOSPITAL | Age: 32
End: 2022-04-05
Payer: COMMERCIAL

## 2022-06-13 DIAGNOSIS — M79.7 FIBROMYALGIA: ICD-10-CM

## 2022-06-13 RX ORDER — TRAZODONE HYDROCHLORIDE 50 MG/1
50 TABLET ORAL NIGHTLY
Qty: 90 TABLET | Refills: 3 | Status: SHIPPED | OUTPATIENT
Start: 2022-06-13 | End: 2022-06-30

## 2022-06-13 NOTE — TELEPHONE ENCOUNTER
Refill Routing Note   Medication(s) are not appropriate for processing by [facility] for the following reason(s):      - Indication is outside of scope for [facility]    [facility] action(s):  Route Medication-related problems identified: Requires appointment        Medication reconciliation completed: No     Appointments  past 12m or future 3m with PCP    Date Provider   Last Visit   8/5/2021 Dayton Pepper MD   Next Visit   Visit date not found Dayton Pepper MD   ED visits in past 90 days: 0        Note composed:4:51 PM 06/13/2022

## 2022-06-13 NOTE — TELEPHONE ENCOUNTER
Care Due:                  Date            Visit Type   Department     Provider  --------------------------------------------------------------------------------                                EP -                              PRIMARY      [facility] FAM[facility]  Last Visit: 08-      CARE ([facility])   MEDICINE       Dayton Pepper  Next Visit: None Scheduled  None         None Found                                                            Last  Test          Frequency    Reason                     Performed    Due Date  --------------------------------------------------------------------------------    Office Visit  12 months..  traZODone................  08- 07-    [facility] Embedded Care Gaps. Reference number: [id number]. 6/13/2022   12:06:05 AM CDT

## 2022-06-30 ENCOUNTER — LAB VISIT (OUTPATIENT)
Dept: LAB | Facility: HOSPITAL | Age: 32
End: 2022-06-30
Attending: INTERNAL MEDICINE
Payer: COMMERCIAL

## 2022-06-30 ENCOUNTER — OFFICE VISIT (OUTPATIENT)
Dept: RHEUMATOLOGY | Facility: CLINIC | Age: 32
End: 2022-06-30
Payer: COMMERCIAL

## 2022-06-30 VITALS
HEART RATE: 98 BPM | HEIGHT: 62 IN | DIASTOLIC BLOOD PRESSURE: 70 MMHG | SYSTOLIC BLOOD PRESSURE: 112 MMHG | BODY MASS INDEX: 20.08 KG/M2 | WEIGHT: 109.13 LBS

## 2022-06-30 DIAGNOSIS — Z15.89 HETEROZYGOUS MTHFR MUTATION C677T: ICD-10-CM

## 2022-06-30 DIAGNOSIS — E61.1 IRON DEFICIENCY: ICD-10-CM

## 2022-06-30 DIAGNOSIS — R00.0 SINUS TACHYCARDIA: ICD-10-CM

## 2022-06-30 DIAGNOSIS — M79.7 FIBROMYALGIA: ICD-10-CM

## 2022-06-30 DIAGNOSIS — R76.8 ANA POSITIVE: ICD-10-CM

## 2022-06-30 DIAGNOSIS — R76.8 ANA POSITIVE: Primary | ICD-10-CM

## 2022-06-30 LAB
ALBUMIN SERPL BCP-MCNC: 4.4 G/DL (ref 3.5–5.2)
ALP SERPL-CCNC: 39 U/L (ref 55–135)
ALT SERPL W/O P-5'-P-CCNC: 11 U/L (ref 10–44)
ANION GAP SERPL CALC-SCNC: 10 MMOL/L (ref 8–16)
AST SERPL-CCNC: 16 U/L (ref 10–40)
BASOPHILS # BLD AUTO: 0.03 K/UL (ref 0–0.2)
BASOPHILS NFR BLD: 0.4 % (ref 0–1.9)
BILIRUB SERPL-MCNC: 0.4 MG/DL (ref 0.1–1)
BUN SERPL-MCNC: 9 MG/DL (ref 6–20)
C3 SERPL-MCNC: 85 MG/DL (ref 50–180)
C4 SERPL-MCNC: 26 MG/DL (ref 11–44)
CALCIUM SERPL-MCNC: 9.7 MG/DL (ref 8.7–10.5)
CCP AB SER IA-ACNC: <0.5 U/ML
CHLORIDE SERPL-SCNC: 105 MMOL/L (ref 95–110)
CO2 SERPL-SCNC: 22 MMOL/L (ref 23–29)
CREAT SERPL-MCNC: 0.8 MG/DL (ref 0.5–1.4)
CRP SERPL-MCNC: 1 MG/L (ref 0–8.2)
DIFFERENTIAL METHOD: ABNORMAL
EOSINOPHIL # BLD AUTO: 0 K/UL (ref 0–0.5)
EOSINOPHIL NFR BLD: 0.2 % (ref 0–8)
ERYTHROCYTE [DISTWIDTH] IN BLOOD BY AUTOMATED COUNT: 11.8 % (ref 11.5–14.5)
ERYTHROCYTE [SEDIMENTATION RATE] IN BLOOD BY PHOTOMETRIC METHOD: <2 MM/HR (ref 0–36)
EST. GFR  (AFRICAN AMERICAN): >60 ML/MIN/1.73 M^2
EST. GFR  (NON AFRICAN AMERICAN): >60 ML/MIN/1.73 M^2
GLUCOSE SERPL-MCNC: 92 MG/DL (ref 70–110)
HCG INTACT+B SERPL-ACNC: 15 MIU/ML
HCT VFR BLD AUTO: 41.8 % (ref 37–48.5)
HGB BLD-MCNC: 13.9 G/DL (ref 12–16)
IGA SERPL-MCNC: 199 MG/DL (ref 40–350)
IGG SERPL-MCNC: 1097 MG/DL (ref 650–1600)
IGM SERPL-MCNC: 111 MG/DL (ref 50–300)
IMM GRANULOCYTES # BLD AUTO: 0.02 K/UL (ref 0–0.04)
IMM GRANULOCYTES NFR BLD AUTO: 0.2 % (ref 0–0.5)
LYMPHOCYTES # BLD AUTO: 2.1 K/UL (ref 1–4.8)
LYMPHOCYTES NFR BLD: 26.1 % (ref 18–48)
MCH RBC QN AUTO: 31.6 PG (ref 27–31)
MCHC RBC AUTO-ENTMCNC: 33.3 G/DL (ref 32–36)
MCV RBC AUTO: 95 FL (ref 82–98)
MONOCYTES # BLD AUTO: 0.4 K/UL (ref 0.3–1)
MONOCYTES NFR BLD: 4.9 % (ref 4–15)
NEUTROPHILS # BLD AUTO: 5.5 K/UL (ref 1.8–7.7)
NEUTROPHILS NFR BLD: 68.2 % (ref 38–73)
NRBC BLD-RTO: 0 /100 WBC
PLATELET # BLD AUTO: 291 K/UL (ref 150–450)
PMV BLD AUTO: 9.3 FL (ref 9.2–12.9)
POTASSIUM SERPL-SCNC: 3.9 MMOL/L (ref 3.5–5.1)
PROT SERPL-MCNC: 7.5 G/DL (ref 6–8.4)
RBC # BLD AUTO: 4.4 M/UL (ref 4–5.4)
RHEUMATOID FACT SERPL-ACNC: <13 IU/ML (ref 0–15)
SODIUM SERPL-SCNC: 137 MMOL/L (ref 136–145)
T4 FREE SERPL-MCNC: 0.98 NG/DL (ref 0.71–1.51)
THYROGLOB AB SERPL IA-ACNC: <4 IU/ML (ref 0–3.9)
THYROPEROXIDASE IGG SERPL-ACNC: <6 IU/ML
TSH SERPL DL<=0.005 MIU/L-ACNC: 1.3 UIU/ML (ref 0.4–4)
WBC # BLD AUTO: 8.02 K/UL (ref 3.9–12.7)

## 2022-06-30 PROCEDURE — 85025 COMPLETE CBC W/AUTO DIFF WBC: CPT | Performed by: INTERNAL MEDICINE

## 2022-06-30 PROCEDURE — 3074F SYST BP LT 130 MM HG: CPT | Mod: CPTII,S$GLB,, | Performed by: INTERNAL MEDICINE

## 2022-06-30 PROCEDURE — 99999 PR PBB SHADOW E&M-EST. PATIENT-LVL III: ICD-10-PCS | Mod: PBBFAC,,, | Performed by: INTERNAL MEDICINE

## 2022-06-30 PROCEDURE — 84439 ASSAY OF FREE THYROXINE: CPT | Performed by: INTERNAL MEDICINE

## 2022-06-30 PROCEDURE — 86038 ANTINUCLEAR ANTIBODIES: CPT | Performed by: INTERNAL MEDICINE

## 2022-06-30 PROCEDURE — 80053 COMPREHEN METABOLIC PANEL: CPT | Performed by: INTERNAL MEDICINE

## 2022-06-30 PROCEDURE — 3078F PR MOST RECENT DIASTOLIC BLOOD PRESSURE < 80 MM HG: ICD-10-PCS | Mod: CPTII,S$GLB,, | Performed by: INTERNAL MEDICINE

## 2022-06-30 PROCEDURE — 86160 COMPLEMENT ANTIGEN: CPT | Performed by: INTERNAL MEDICINE

## 2022-06-30 PROCEDURE — 86376 MICROSOMAL ANTIBODY EACH: CPT | Performed by: INTERNAL MEDICINE

## 2022-06-30 PROCEDURE — 85610 PROTHROMBIN TIME: CPT | Performed by: INTERNAL MEDICINE

## 2022-06-30 PROCEDURE — 86225 DNA ANTIBODY NATIVE: CPT | Performed by: INTERNAL MEDICINE

## 2022-06-30 PROCEDURE — 82784 ASSAY IGA/IGD/IGG/IGM EACH: CPT | Mod: 59 | Performed by: INTERNAL MEDICINE

## 2022-06-30 PROCEDURE — 81375 HLA II TYPING AG EQUIV LR: CPT | Mod: PO

## 2022-06-30 PROCEDURE — 3078F DIAST BP <80 MM HG: CPT | Mod: CPTII,S$GLB,, | Performed by: INTERNAL MEDICINE

## 2022-06-30 PROCEDURE — 86235 NUCLEAR ANTIGEN ANTIBODY: CPT | Mod: 59 | Performed by: INTERNAL MEDICINE

## 2022-06-30 PROCEDURE — 3008F BODY MASS INDEX DOCD: CPT | Mod: CPTII,S$GLB,, | Performed by: INTERNAL MEDICINE

## 2022-06-30 PROCEDURE — 85220 BLOOC CLOT FACTOR V TEST: CPT | Performed by: INTERNAL MEDICINE

## 2022-06-30 PROCEDURE — 86162 COMPLEMENT TOTAL (CH50): CPT | Performed by: INTERNAL MEDICINE

## 2022-06-30 PROCEDURE — 3074F PR MOST RECENT SYSTOLIC BLOOD PRESSURE < 130 MM HG: ICD-10-PCS | Mod: CPTII,S$GLB,, | Performed by: INTERNAL MEDICINE

## 2022-06-30 PROCEDURE — 85305 CLOT INHIBIT PROT S TOTAL: CPT | Performed by: INTERNAL MEDICINE

## 2022-06-30 PROCEDURE — 99205 PR OFFICE/OUTPT VISIT, NEW, LEVL V, 60-74 MIN: ICD-10-PCS | Mod: S$GLB,,, | Performed by: INTERNAL MEDICINE

## 2022-06-30 PROCEDURE — 85652 RBC SED RATE AUTOMATED: CPT | Performed by: INTERNAL MEDICINE

## 2022-06-30 PROCEDURE — 85303 CLOT INHIBIT PROT C ACTIVITY: CPT | Performed by: INTERNAL MEDICINE

## 2022-06-30 PROCEDURE — 99999 PR PBB SHADOW E&M-EST. PATIENT-LVL III: CPT | Mod: PBBFAC,,, | Performed by: INTERNAL MEDICINE

## 2022-06-30 PROCEDURE — 86431 RHEUMATOID FACTOR QUANT: CPT | Performed by: INTERNAL MEDICINE

## 2022-06-30 PROCEDURE — 82784 ASSAY IGA/IGD/IGG/IGM EACH: CPT | Performed by: INTERNAL MEDICINE

## 2022-06-30 PROCEDURE — 86147 CARDIOLIPIN ANTIBODY EA IG: CPT | Performed by: INTERNAL MEDICINE

## 2022-06-30 PROCEDURE — 84702 CHORIONIC GONADOTROPIN TEST: CPT | Performed by: INTERNAL MEDICINE

## 2022-06-30 PROCEDURE — 83516 IMMUNOASSAY NONANTIBODY: CPT | Performed by: INTERNAL MEDICINE

## 2022-06-30 PROCEDURE — 3008F PR BODY MASS INDEX (BMI) DOCUMENTED: ICD-10-PCS | Mod: CPTII,S$GLB,, | Performed by: INTERNAL MEDICINE

## 2022-06-30 PROCEDURE — 81374 HLA I TYPING 1 ANTIGEN LR: CPT | Mod: PO | Performed by: INTERNAL MEDICINE

## 2022-06-30 PROCEDURE — 86800 THYROGLOBULIN ANTIBODY: CPT | Performed by: INTERNAL MEDICINE

## 2022-06-30 PROCEDURE — 82787 IGG 1 2 3 OR 4 EACH: CPT | Performed by: INTERNAL MEDICINE

## 2022-06-30 PROCEDURE — 86235 NUCLEAR ANTIGEN ANTIBODY: CPT | Performed by: INTERNAL MEDICINE

## 2022-06-30 PROCEDURE — 84443 ASSAY THYROID STIM HORMONE: CPT | Performed by: INTERNAL MEDICINE

## 2022-06-30 PROCEDURE — 86160 COMPLEMENT ANTIGEN: CPT | Mod: 59 | Performed by: INTERNAL MEDICINE

## 2022-06-30 PROCEDURE — 86140 C-REACTIVE PROTEIN: CPT | Performed by: INTERNAL MEDICINE

## 2022-06-30 PROCEDURE — 1159F PR MEDICATION LIST DOCUMENTED IN MEDICAL RECORD: ICD-10-PCS | Mod: CPTII,S$GLB,, | Performed by: INTERNAL MEDICINE

## 2022-06-30 PROCEDURE — 1159F MED LIST DOCD IN RCRD: CPT | Mod: CPTII,S$GLB,, | Performed by: INTERNAL MEDICINE

## 2022-06-30 PROCEDURE — 86200 CCP ANTIBODY: CPT | Performed by: INTERNAL MEDICINE

## 2022-06-30 PROCEDURE — 81241 F5 GENE: CPT | Performed by: INTERNAL MEDICINE

## 2022-06-30 PROCEDURE — 36415 COLL VENOUS BLD VENIPUNCTURE: CPT | Mod: PO | Performed by: INTERNAL MEDICINE

## 2022-06-30 PROCEDURE — 99205 OFFICE O/P NEW HI 60 MIN: CPT | Mod: S$GLB,,, | Performed by: INTERNAL MEDICINE

## 2022-06-30 NOTE — PROGRESS NOTES
Answers for HPI/ROS submitted by the patient on 6/27/2022  fever: No  eye redness: No  mouth sores: No  headaches: Yes  shortness of breath: No  chest pain: No  trouble swallowing: No  diarrhea: No  constipation: Yes  unexpected weight change: No  genital sore: No  dysuria: No  During the last 3 days, have you had a skin rash?: No  Bruises or bleeds easily: No  cough: No

## 2022-06-30 NOTE — PROGRESS NOTES
Subjective:       Patient ID: Cheryl Peter is a 31 y.o. female.    Chief Complaint: Disease Management    HPI: 32 yo female with a h/o positive lashonda and rosacea myalgia, late teens and early twenties with fatigue,  She did Melod[employer] dancing. Lashonda  1:40,  She starte d myalgia and joint pain, no true swelling. She has am gelling 3 hours, she always tired, never sleeps.  Patient complains of arthralgias and myalgias for which has been present for a few years. Pain is located in multiple joints, both shoulder(s), both elbow(s), both wrist(s), both MCP(s): 1st, 2nd, 3rd, 4th and 5th, both PIP(s): 1st, 2nd, 3rd, 4th and 5th, both DIP(s): 1st and 2nd, both hip(s), both knee(s) and both MTP(s): 1st, 2nd, 3rd, 4th and 5th, is described as aching, pulsating, shooting and throbbing, and is constant, moderate .  Associated symptoms include: crepitation, decreased range of motion, edema, effusion, tenderness and warmth. She has tried trazadone, ambien,  c section,  She  L side of her neck  painful and tenderness.  Non MS,  RA mother and grandmother, psoriatic nail L second toes    Review of Systems   Constitutional: Positive for activity change and fatigue. Negative for appetite change, chills, diaphoresis, fever and unexpected weight change.   HENT: Negative for congestion, dental problem, ear discharge, ear pain, facial swelling, mouth sores, nosebleeds, postnasal drip, rhinorrhea, sinus pressure, sneezing, sore throat, tinnitus, trouble swallowing and voice change.    Eyes: Negative for photophobia, pain, discharge, redness and itching.   Respiratory: Negative for apnea, cough, chest tightness, shortness of breath and wheezing.    Cardiovascular: Positive for leg swelling. Negative for chest pain and palpitations.   Gastrointestinal: Positive for constipation. Negative for abdominal distention, abdominal pain, diarrhea, nausea and vomiting.   Endocrine: Negative for cold intolerance, heat intolerance, polydipsia and  "polyuria.   Genitourinary: Negative for decreased urine volume, difficulty urinating, dysuria, flank pain, frequency, genital sores, hematuria and urgency.   Musculoskeletal: Positive for arthralgias, back pain, myalgias, neck pain and neck stiffness. Negative for gait problem and joint swelling.   Skin: Negative for pallor, rash and wound.   Allergic/Immunologic: Negative for immunocompromised state.   Neurological: Positive for headaches. Negative for dizziness, tremors, weakness and numbness.   Hematological: Negative for adenopathy. Does not bruise/bleed easily.   Psychiatric/Behavioral: Negative for sleep disturbance. The patient is not nervous/anxious.          Objective:   /70   Pulse 98   Ht 5' 2.01" (1.575 m)   Wt 49.5 kg (109 lb 2 oz)   BMI 19.95 kg/m²      Physical Exam   Constitutional: She is oriented to person, place, and time.   HENT:   Head: Normocephalic and atraumatic.   Mouth/Throat: Oropharynx is clear and moist.   Eyes: Pupils are equal, round, and reactive to light.   Neck: No thyromegaly present.   Cardiovascular: Normal rate, regular rhythm and normal heart sounds. Exam reveals no gallop and no friction rub.   No murmur heard.  Pulmonary/Chest: Breath sounds normal. She has no wheezes. She has no rales. She exhibits no tenderness.   Abdominal: There is no abdominal tenderness. There is no rebound and no guarding.   Musculoskeletal:         General: Swelling and tenderness present.      Right shoulder: Normal.      Left shoulder: Normal.      Right elbow: Normal.      Left elbow: Normal.      Right wrist: Normal.      Left wrist: Normal.      Cervical back: Neck supple.      Right knee: Normal. No effusion.      Left knee: Normal. No effusion.      Comments: Wrist,    Lymphadenopathy:     She has no cervical adenopathy.   Neurological: She is alert and oriented to person, place, and time. Gait normal.   Skin: No rash noted. No erythema. No pallor.   Psychiatric: Mood and affect " normal.   Vitals reviewed.      Right Side Rheumatological Exam     Examination finds the shoulder, elbow, wrist, knee, 1st PIP, 1st MCP, 2nd PIP, 2nd MCP, 3rd PIP, 3rd MCP, 4th PIP, 4th MCP, 5th PIP and 5th MCP normal.    Shoulder Exam   Tenderness Location: no tenderness    Range of Motion   Active abduction: abnormal   Adduction: abnormal  Sensation: normal    Knee Exam   Patellofemoral Crepitus: negative  Effusion: negative  Sensation: normal    Hip Exam   Tenderness Location: no tenderness  Sensation: normal    Elbow/Wrist Exam   Tenderness Location: no tenderness  Sensation: normal    Left Side Rheumatological Exam     Examination finds the shoulder, elbow, wrist, knee, 1st PIP, 1st MCP, 2nd PIP, 2nd MCP, 3rd PIP, 3rd MCP, 4th PIP, 4th MCP, 5th PIP and 5th MCP normal.    Shoulder Exam   Tenderness Location: no tenderness    Range of Motion   Active abduction: abnormal   Sensation: normal    Knee Exam     Patellofemoral Crepitus: negative  Effusion: negative  Sensation: normal    Hip Exam   Tenderness Location: no tenderness  Sensation: normal    Elbow/Wrist Exam   Sensation: normal      Back/Neck Exam   General Inspection   Gait: normal              Results for orders placed or performed in visit on 03/09/22   CBC Auto Differential   Result Value Ref Range    WBC 6.29 3.90 - 12.70 K/uL    RBC 4.18 4.00 - 5.40 M/uL    Hemoglobin 13.1 12.0 - 16.0 g/dL    Hematocrit 40.0 37.0 - 48.5 %    MCV 96 82 - 98 fL    MCH 31.3 (H) 27.0 - 31.0 pg    MCHC 32.8 32.0 - 36.0 g/dL    RDW 11.9 11.5 - 14.5 %    Platelets 292 150 - 450 K/uL    MPV 9.4 9.2 - 12.9 fL    Immature Granulocytes 0.3 0.0 - 0.5 %    Gran # (ANC) 3.5 1.8 - 7.7 K/uL    Immature Grans (Abs) 0.02 0.00 - 0.04 K/uL    Lymph # 2.3 1.0 - 4.8 K/uL    Mono # 0.4 0.3 - 1.0 K/uL    Eos # 0.0 0.0 - 0.5 K/uL    Baso # 0.03 0.00 - 0.20 K/uL    nRBC 0 0 /100 WBC    Gran % 55.5 38.0 - 73.0 %    Lymph % 37.2 18.0 - 48.0 %    Mono % 6.2 4.0 - 15.0 %    Eosinophil % 0.3 0.0 -  8.0 %    Basophil % 0.5 0.0 - 1.9 %    Differential Method Automated    Comprehensive Metabolic Panel   Result Value Ref Range    Sodium 135 (L) 136 - 145 mmol/L    Potassium 4.2 3.5 - 5.1 mmol/L    Chloride 101 95 - 110 mmol/L    CO2 27 23 - 29 mmol/L    Glucose 81 70 - 110 mg/dL    BUN 8 6 - 20 mg/dL    Creatinine 0.7 0.5 - 1.4 mg/dL    Calcium 9.8 8.7 - 10.5 mg/dL    Total Protein 7.0 6.0 - 8.4 g/dL    Albumin 4.1 3.5 - 5.2 g/dL    Total Bilirubin 0.2 0.1 - 1.0 mg/dL    Alkaline Phosphatase 35 (L) 55 - 135 U/L    AST 13 10 - 40 U/L    ALT 8 (L) 10 - 44 U/L    Anion Gap 7 (L) 8 - 16 mmol/L    eGFR if African American >60.0 >60 mL/min/1.73 m^2    eGFR if non African American >60.0 >60 mL/min/1.73 m^2   Ferritin   Result Value Ref Range    Ferritin 133 20.0 - 300.0 ng/mL   IRON AND TIBC   Result Value Ref Range    Iron 69 30 - 160 ug/dL    Transferrin 228 200 - 375 mg/dL    TIBC 337 250 - 450 ug/dL    Saturated Iron 20 20 - 50 %        Assessment:       1. MERRY positive    2. Iron deficiency    3. Fibromyalgia    4. Sinus tachycardia            Plan:       Cheryl was seen today for disease management.    Diagnoses and all orders for this visit:    MERRY positive  -     MERRY Screen w/Reflex; Future  -     Anti Sm/RNP Antibody; Future  -     Anti-DNA Ab, Double-Stranded; Future  -     Anti-Histone Antibody; Future  -     Anti-Scleroderma Antibody; Future  -     Anti-Smith Antibody; Future  -     Anti-Thyroglobulin Antibody; Future  -     Cardiolipin antibody; Future  -     CBC Auto Differential; Future  -     Comprehensive Metabolic Panel; Future  -     IgA; Future  -     IgG; Future  -     IgG 1, 2, 3, and 4; Future  -     IgM; Future  -     Protein S Activity; Future  -     Protein C Activity; Future  -     Rheumatoid Factor; Future  -     Sedimentation rate; Future  -     Sjogrens syndrome-A extractable nuclear antibody; Future  -     Sjogrens syndrome-B extractable nuclear antibody; Future  -     T4, Free; Future  -      Thyroid Peroxidase Antibody; Future  -     TSH; Future  -     C-Reactive Protein; Future  -     Cyclic Citrullinated Peptide Antibody, IgG; Future  -     DRVVT; Future  -     Factor 5 leiden; Future  -     C3 Complement; Future  -     C4 Complement; Future  -     HLA CELIAC CL2; Future  -     Complement, Total; Future  -     Factor 5 Assay; Future  -     HLA B27 Antigen; Future  -     HCG, QUANTITATIVE, PREGNANCY; Future    Iron deficiency  -     MERRY Screen w/Reflex; Future  -     Anti Sm/RNP Antibody; Future  -     Anti-DNA Ab, Double-Stranded; Future  -     Anti-Histone Antibody; Future  -     Anti-Scleroderma Antibody; Future  -     Anti-Smith Antibody; Future  -     Anti-Thyroglobulin Antibody; Future  -     Cardiolipin antibody; Future  -     CBC Auto Differential; Future  -     Comprehensive Metabolic Panel; Future  -     IgA; Future  -     IgG; Future  -     IgG 1, 2, 3, and 4; Future  -     IgM; Future  -     Protein S Activity; Future  -     Protein C Activity; Future  -     Rheumatoid Factor; Future  -     Sedimentation rate; Future  -     Sjogrens syndrome-A extractable nuclear antibody; Future  -     Sjogrens syndrome-B extractable nuclear antibody; Future  -     T4, Free; Future  -     Thyroid Peroxidase Antibody; Future  -     TSH; Future  -     C-Reactive Protein; Future  -     Cyclic Citrullinated Peptide Antibody, IgG; Future  -     DRVVT; Future  -     Factor 5 leiden; Future  -     C3 Complement; Future  -     C4 Complement; Future  -     HLA CELIAC CL2; Future  -     Complement, Total; Future  -     Factor 5 Assay; Future  -     HLA B27 Antigen; Future  -     HCG, QUANTITATIVE, PREGNANCY; Future    Fibromyalgia  -     MERRY Screen w/Reflex; Future  -     Anti Sm/RNP Antibody; Future  -     Anti-DNA Ab, Double-Stranded; Future  -     Anti-Histone Antibody; Future  -     Anti-Scleroderma Antibody; Future  -     Anti-Smith Antibody; Future  -     Anti-Thyroglobulin Antibody; Future  -      Cardiolipin antibody; Future  -     CBC Auto Differential; Future  -     Comprehensive Metabolic Panel; Future  -     IgA; Future  -     IgG; Future  -     IgG 1, 2, 3, and 4; Future  -     IgM; Future  -     Protein S Activity; Future  -     Protein C Activity; Future  -     Rheumatoid Factor; Future  -     Sedimentation rate; Future  -     Sjogrens syndrome-A extractable nuclear antibody; Future  -     Sjogrens syndrome-B extractable nuclear antibody; Future  -     T4, Free; Future  -     Thyroid Peroxidase Antibody; Future  -     TSH; Future  -     C-Reactive Protein; Future  -     Cyclic Citrullinated Peptide Antibody, IgG; Future  -     DRVVT; Future  -     Factor 5 leiden; Future  -     C3 Complement; Future  -     C4 Complement; Future  -     HLA CELIAC CL2; Future  -     Complement, Total; Future  -     Factor 5 Assay; Future  -     HLA B27 Antigen; Future  -     HCG, QUANTITATIVE, PREGNANCY; Future    Sinus tachycardia  -     MERRY Screen w/Reflex; Future  -     Anti Sm/RNP Antibody; Future  -     Anti-DNA Ab, Double-Stranded; Future  -     Anti-Histone Antibody; Future  -     Anti-Scleroderma Antibody; Future  -     Anti-Smith Antibody; Future  -     Anti-Thyroglobulin Antibody; Future  -     Cardiolipin antibody; Future  -     CBC Auto Differential; Future  -     Comprehensive Metabolic Panel; Future  -     IgA; Future  -     IgG; Future  -     IgG 1, 2, 3, and 4; Future  -     IgM; Future  -     Protein S Activity; Future  -     Protein C Activity; Future  -     Rheumatoid Factor; Future  -     Sedimentation rate; Future  -     Sjogrens syndrome-A extractable nuclear antibody; Future  -     Sjogrens syndrome-B extractable nuclear antibody; Future  -     T4, Free; Future  -     Thyroid Peroxidase Antibody; Future  -     TSH; Future  -     C-Reactive Protein; Future  -     Cyclic Citrullinated Peptide Antibody, IgG; Future  -     DRVVT; Future  -     Factor 5 leiden; Future  -     C3 Complement; Future  -      C4 Complement; Future  -     HLA CELIAC CL2; Future  -     Complement, Total; Future  -     Factor 5 Assay; Future  -     HLA B27 Antigen; Future  -     HCG, QUANTITATIVE, PREGNANCY; Future

## 2022-07-01 ENCOUNTER — PATIENT MESSAGE (OUTPATIENT)
Dept: RHEUMATOLOGY | Facility: CLINIC | Age: 32
End: 2022-07-01
Payer: COMMERCIAL

## 2022-07-01 LAB
ANA SER QL IF: NORMAL
ANTI SM ANTIBODY: 0.11 RATIO (ref 0–0.99)
ANTI SM/RNP ANTIBODY: 0.1 RATIO (ref 0–0.99)
ANTI-SM INTERPRETATION: NEGATIVE
ANTI-SM/RNP INTERPRETATION: NEGATIVE
ANTI-SSA ANTIBODY: 0.09 RATIO (ref 0–0.99)
ANTI-SSA INTERPRETATION: NEGATIVE
ANTI-SSB ANTIBODY: 0.05 RATIO (ref 0–0.99)
ANTI-SSB INTERPRETATION: NEGATIVE
DSDNA AB SER-ACNC: NORMAL [IU]/ML
FACT V ACT/NOR PPP: 105 % (ref 60–145)

## 2022-07-04 LAB — HISTONE IGG SER IA-ACNC: 0.4 UNITS (ref 0–0.9)

## 2022-07-05 LAB
CARDIOLIPIN IGG SER IA-ACNC: <9.4 GPL (ref 0–14.99)
CARDIOLIPIN IGM SER IA-ACNC: 10.4 MPL (ref 0–12.49)
CH50 SERPL-ACNC: 60 U/ML (ref 42–95)
ENA SCL70 AB SER-ACNC: 3 UNITS
F5 P.R506Q BLD/T QL: NEGATIVE
IGG1 SER-MCNC: 529 MG/DL (ref 382–929)
IGG2 SER-MCNC: 461 MG/DL (ref 242–700)
IGG3 SER-MCNC: 83 MG/DL (ref 22–176)
IGG4 SER-MCNC: 56 MG/DL (ref 4–86)
PROT C ACT/NOR PPP CHRO: 107 % (ref 70–150)
PROT S ACT/NOR PPP: 94 % (ref 50–160)

## 2022-07-06 ENCOUNTER — PATIENT MESSAGE (OUTPATIENT)
Dept: RHEUMATOLOGY | Facility: CLINIC | Age: 32
End: 2022-07-06
Payer: COMMERCIAL

## 2022-07-07 LAB
APTT IMM NP PPP: ABNORMAL SEC (ref 32–48)
APTT P HEP NEUT PPP: ABNORMAL SEC (ref 32–48)
CONFIRM APTT STACLOT: ABNORMAL
DRVVT SCREEN TO CONFIRM RATIO: ABNORMAL RATIO
LA 3 SCREEN W REFLEX-IMP: ABNORMAL
LA NT DPL PPP QL: ABNORMAL
MIXING DRVVT: ABNORMAL SEC (ref 33–44)
PROTHROMBIN TIME: 12.6 SEC (ref 12–15.5)
REPTILASE TIME: ABNORMAL SEC
SCREEN APTT: 42 SEC (ref 32–48)
SCREEN DRVVT: 31 SEC (ref 33–44)
THROMBIN TIME: ABNORMAL SEC (ref 14.7–19.5)

## 2022-07-12 ENCOUNTER — PATIENT MESSAGE (OUTPATIENT)
Dept: RHEUMATOLOGY | Facility: CLINIC | Age: 32
End: 2022-07-12
Payer: COMMERCIAL

## 2022-07-20 LAB
HLA B27 INTERPRETATION: NORMAL
HLA-B27 RELATED AG QL: NEGATIVE
HLA-B27 RELATED AG QL: NORMAL

## 2022-07-28 ENCOUNTER — PATIENT MESSAGE (OUTPATIENT)
Dept: RHEUMATOLOGY | Facility: CLINIC | Age: 32
End: 2022-07-28
Payer: COMMERCIAL

## 2022-08-06 LAB
CELIA INTERPRETATION: NORMAL
CELIA TESTING DATE: NORMAL
HLA DQA1 1: NORMAL
HLA DQA1 2: NORMAL
HLA DRB4 1: NORMAL
HLA-DP 1 SERO. EQUIV: NORMAL
HLA-DP 2 SERO. EQUIV: NORMAL
HLA-DPA1 1: NORMAL
HLA-DPA1 2: NORMAL
HLA-DPB1 1: NORMAL
HLA-DPB1 2: NORMAL
HLA-DQ 1 SERO. EQUIV: 2
HLA-DQ 2 SERO. EQUIV: 9
HLA-DQB1 1: NORMAL
HLA-DQB1 2: NORMAL
HLA-DRB1 1 SERO. EQUIV: NORMAL
HLA-DRB1 1: NORMAL
HLA-DRB1 2 SERO. EQUIV: NORMAL
HLA-DRB1 2: NORMAL
HLA-DRB3 1: NORMAL
HLA-DRB3 2: NORMAL
HLA-DRB345 1 SERO. EQUIV: NORMAL
HLA-DRB345 2 SERO. EQUIV: NORMAL
HLA-DRB4 2: NORMAL
HLA-DRB5 1: NORMAL
HLA-DRB5 2: NORMAL

## 2022-09-09 ENCOUNTER — LAB VISIT (OUTPATIENT)
Dept: LAB | Facility: HOSPITAL | Age: 32
End: 2022-09-09
Payer: COMMERCIAL

## 2022-09-09 DIAGNOSIS — E61.1 IRON DEFICIENCY: Primary | ICD-10-CM

## 2022-09-09 LAB
ALBUMIN SERPL BCP-MCNC: 4.5 G/DL (ref 3.5–5.2)
ALP SERPL-CCNC: 33 U/L (ref 55–135)
ALT SERPL W/O P-5'-P-CCNC: 7 U/L (ref 10–44)
ANION GAP SERPL CALC-SCNC: 10 MMOL/L (ref 8–16)
AST SERPL-CCNC: 13 U/L (ref 10–40)
BASOPHILS # BLD AUTO: 0.03 K/UL (ref 0–0.2)
BASOPHILS NFR BLD: 0.6 % (ref 0–1.9)
BILIRUB SERPL-MCNC: 0.3 MG/DL (ref 0.1–1)
BUN SERPL-MCNC: 9 MG/DL (ref 6–20)
CALCIUM SERPL-MCNC: 9.8 MG/DL (ref 8.7–10.5)
CHLORIDE SERPL-SCNC: 106 MMOL/L (ref 95–110)
CO2 SERPL-SCNC: 25 MMOL/L (ref 23–29)
CREAT SERPL-MCNC: 0.7 MG/DL (ref 0.5–1.4)
DIFFERENTIAL METHOD: NORMAL
EOSINOPHIL # BLD AUTO: 0 K/UL (ref 0–0.5)
EOSINOPHIL NFR BLD: 0.6 % (ref 0–8)
ERYTHROCYTE [DISTWIDTH] IN BLOOD BY AUTOMATED COUNT: 11.9 % (ref 11.5–14.5)
EST. GFR  (NO RACE VARIABLE): >60 ML/MIN/1.73 M^2
FERRITIN SERPL-MCNC: 84 NG/ML (ref 20–300)
GLUCOSE SERPL-MCNC: 85 MG/DL (ref 70–110)
HCT VFR BLD AUTO: 40.3 % (ref 37–48.5)
HGB BLD-MCNC: 13.5 G/DL (ref 12–16)
IMM GRANULOCYTES # BLD AUTO: 0.01 K/UL (ref 0–0.04)
IMM GRANULOCYTES NFR BLD AUTO: 0.2 % (ref 0–0.5)
IRON SERPL-MCNC: 96 UG/DL (ref 30–160)
LYMPHOCYTES # BLD AUTO: 2 K/UL (ref 1–4.8)
LYMPHOCYTES NFR BLD: 41.5 % (ref 18–48)
MCH RBC QN AUTO: 31 PG (ref 27–31)
MCHC RBC AUTO-ENTMCNC: 33.5 G/DL (ref 32–36)
MCV RBC AUTO: 93 FL (ref 82–98)
MONOCYTES # BLD AUTO: 0.4 K/UL (ref 0.3–1)
MONOCYTES NFR BLD: 7.4 % (ref 4–15)
NEUTROPHILS # BLD AUTO: 2.4 K/UL (ref 1.8–7.7)
NEUTROPHILS NFR BLD: 49.7 % (ref 38–73)
NRBC BLD-RTO: 0 /100 WBC
PLATELET # BLD AUTO: 265 K/UL (ref 150–450)
PMV BLD AUTO: 9.3 FL (ref 9.2–12.9)
POTASSIUM SERPL-SCNC: 4.6 MMOL/L (ref 3.5–5.1)
PROT SERPL-MCNC: 7.4 G/DL (ref 6–8.4)
RBC # BLD AUTO: 4.35 M/UL (ref 4–5.4)
SATURATED IRON: 29 % (ref 20–50)
SODIUM SERPL-SCNC: 141 MMOL/L (ref 136–145)
TOTAL IRON BINDING CAPACITY: 336 UG/DL (ref 250–450)
TRANSFERRIN SERPL-MCNC: 227 MG/DL (ref 200–375)
WBC # BLD AUTO: 4.87 K/UL (ref 3.9–12.7)

## 2022-09-09 PROCEDURE — 85025 COMPLETE CBC W/AUTO DIFF WBC: CPT | Performed by: NURSE PRACTITIONER

## 2022-09-09 PROCEDURE — 36415 COLL VENOUS BLD VENIPUNCTURE: CPT | Mod: PO | Performed by: NURSE PRACTITIONER

## 2022-09-09 PROCEDURE — 80053 COMPREHEN METABOLIC PANEL: CPT | Performed by: NURSE PRACTITIONER

## 2022-09-09 PROCEDURE — 84466 ASSAY OF TRANSFERRIN: CPT | Performed by: NURSE PRACTITIONER

## 2022-09-09 PROCEDURE — 82728 ASSAY OF FERRITIN: CPT | Performed by: NURSE PRACTITIONER

## 2022-09-23 ENCOUNTER — OFFICE VISIT (OUTPATIENT)
Dept: OPTOMETRY | Facility: CLINIC | Age: 32
End: 2022-09-23
Payer: COMMERCIAL

## 2022-09-23 DIAGNOSIS — H10.13 ALLERGIC CONJUNCTIVITIS, BILATERAL: Primary | ICD-10-CM

## 2022-09-23 PROCEDURE — 92002 INTRM OPH EXAM NEW PATIENT: CPT | Mod: S$GLB,,, | Performed by: OPTOMETRIST

## 2022-09-23 PROCEDURE — 92002 PR EYE EXAM, NEW PATIENT,INTERMED: ICD-10-PCS | Mod: S$GLB,,, | Performed by: OPTOMETRIST

## 2022-09-23 PROCEDURE — 1160F RVW MEDS BY RX/DR IN RCRD: CPT | Mod: CPTII,S$GLB,, | Performed by: OPTOMETRIST

## 2022-09-23 PROCEDURE — 1159F PR MEDICATION LIST DOCUMENTED IN MEDICAL RECORD: ICD-10-PCS | Mod: CPTII,S$GLB,, | Performed by: OPTOMETRIST

## 2022-09-23 PROCEDURE — 1159F MED LIST DOCD IN RCRD: CPT | Mod: CPTII,S$GLB,, | Performed by: OPTOMETRIST

## 2022-09-23 PROCEDURE — 1160F PR REVIEW ALL MEDS BY PRESCRIBER/CLIN PHARMACIST DOCUMENTED: ICD-10-PCS | Mod: CPTII,S$GLB,, | Performed by: OPTOMETRIST

## 2022-09-23 PROCEDURE — 99999 PR PBB SHADOW E&M-EST. PATIENT-LVL II: CPT | Mod: PBBFAC,,, | Performed by: OPTOMETRIST

## 2022-09-23 PROCEDURE — 99999 PR PBB SHADOW E&M-EST. PATIENT-LVL II: ICD-10-PCS | Mod: PBBFAC,,, | Performed by: OPTOMETRIST

## 2022-09-23 RX ORDER — PROGESTERONE 200 MG/1
CAPSULE ORAL
COMMUNITY
Start: 2022-08-29

## 2022-09-23 RX ORDER — TOBRAMYCIN AND DEXAMETHASONE 3; 1 MG/ML; MG/ML
1 SUSPENSION/ DROPS OPHTHALMIC 4 TIMES DAILY
Qty: 2.5 ML | Refills: 0 | Status: SHIPPED | OUTPATIENT
Start: 2022-09-23 | End: 2022-09-30

## 2022-09-23 NOTE — PROGRESS NOTES
HPI    Pt here for possible eye infection OD     Pt states for two weeks ou was feeling itchy. This past week OD started   watering and having green mucus in the mornings, has to use a warm   compress to get it all cleaned up. Also having pain around OD. Has not   used any gtts or geoff.   Last edited by Deidra Garcia on 9/23/2022 10:13 AM.            Assessment /Plan     For exam results, see Encounter Report.    Allergic conjunctivitis, bilateral  -     tobramycin-dexamethasone 0.3-0.1% (TOBRADEX) 0.3-0.1 % DrpS; Place 1 drop into both eyes 4 (four) times daily. for 7 days  Dispense: 2.5 mL; Refill: 0      Start Tobradex drops 4x/day x 1 week, RTC or call if no better, if better can use Pataday long term.

## 2022-09-28 ENCOUNTER — PATIENT MESSAGE (OUTPATIENT)
Dept: RHEUMATOLOGY | Facility: CLINIC | Age: 32
End: 2022-09-28

## 2022-09-28 ENCOUNTER — OFFICE VISIT (OUTPATIENT)
Dept: RHEUMATOLOGY | Facility: CLINIC | Age: 32
End: 2022-09-28
Payer: COMMERCIAL

## 2022-09-28 DIAGNOSIS — R76.8 ANA POSITIVE: Primary | ICD-10-CM

## 2022-09-28 DIAGNOSIS — K90.41 NCGS (NON-CELIAC GLUTEN SENSITIVITY): ICD-10-CM

## 2022-09-28 PROCEDURE — 99214 OFFICE O/P EST MOD 30 MIN: CPT | Mod: 95,,, | Performed by: INTERNAL MEDICINE

## 2022-09-28 PROCEDURE — 99214 PR OFFICE/OUTPT VISIT, EST, LEVL IV, 30-39 MIN: ICD-10-PCS | Mod: 95,,, | Performed by: INTERNAL MEDICINE

## 2022-09-28 NOTE — PROGRESS NOTES
Subjective:       Patient ID: Cheryl Peter is a 32 y.o. female.    Chief Complaint: No chief complaint on file.    Follow up: 33 yo female with a h/o positive lashonda and rosacea myalgia, late teens and early twenties with fatigue,  She did [place] dancing. Lashonda  1:40,  She starte d myalgia and joint pain, Patient complains of arthralgias and myalgias for which has been present for a few years. Pain is located in multiple joints, both shoulder(s), both elbow(s), both wrist(s), both MCP(s): 1st, 2nd, 3rd, 4th and 5th, both PIP(s): 1st, 2nd, 3rd, 4th and 5th, both DIP(s): 1st and 2nd, both hip(s), both knee(s) and both MTP(s): 1st, 2nd, 3rd, 4th and 5th, is described as aching, pulsating, shooting and throbbing, and is constant, moderate .  Associated symptoms include: crepitation, decreased range of motion, edema, effusion, tenderness and warmth. She has tried trazadone, ambien,  c section,  S    Review of Systems   Constitutional:  Positive for activity change and fatigue. Negative for appetite change, chills, diaphoresis, fever and unexpected weight change.   HENT:  Negative for congestion, dental problem, ear discharge, ear pain, facial swelling, mouth sores, nosebleeds, postnasal drip, rhinorrhea, sinus pressure, sneezing, sore throat, tinnitus, trouble swallowing and voice change.    Eyes:  Negative for photophobia, pain, discharge, redness and itching.   Respiratory:  Negative for apnea, cough, chest tightness, shortness of breath and wheezing.    Cardiovascular:  Positive for leg swelling. Negative for chest pain and palpitations.   Gastrointestinal:  Positive for constipation. Negative for abdominal distention, abdominal pain, diarrhea, nausea and vomiting.   Endocrine: Negative for cold intolerance, heat intolerance, polydipsia and polyuria.   Genitourinary:  Negative for decreased urine volume, difficulty urinating, dysuria, flank pain, frequency, genital sores, hematuria and urgency.    Musculoskeletal:  Positive for arthralgias, back pain, myalgias, neck pain and neck stiffness. Negative for gait problem and joint swelling.   Skin:  Negative for pallor, rash and wound.   Allergic/Immunologic: Negative for immunocompromised state.   Neurological:  Positive for headaches. Negative for dizziness, tremors, weakness and numbness.   Hematological:  Negative for adenopathy. Does not bruise/bleed easily.   Psychiatric/Behavioral:  Negative for sleep disturbance. The patient is not nervous/anxious.        Objective:   There were no vitals taken for this visit.     Physical Exam   Constitutional: She is oriented to person, place, and time.   Neurological: She is alert and oriented to person, place, and time.   Psychiatric: Mood, affect and judgment normal.      Results for orders placed or performed in visit on 09/09/22   CBC W/ AUTO DIFFERENTIAL   Result Value Ref Range    WBC 4.87 3.90 - 12.70 K/uL    RBC 4.35 4.00 - 5.40 M/uL    Hemoglobin 13.5 12.0 - 16.0 g/dL    Hematocrit 40.3 37.0 - 48.5 %    MCV 93 82 - 98 fL    MCH 31.0 27.0 - 31.0 pg    MCHC 33.5 32.0 - 36.0 g/dL    RDW 11.9 11.5 - 14.5 %    Platelets 265 150 - 450 K/uL    MPV 9.3 9.2 - 12.9 fL    Immature Granulocytes 0.2 0.0 - 0.5 %    Gran # (ANC) 2.4 1.8 - 7.7 K/uL    Immature Grans (Abs) 0.01 0.00 - 0.04 K/uL    Lymph # 2.0 1.0 - 4.8 K/uL    Mono # 0.4 0.3 - 1.0 K/uL    Eos # 0.0 0.0 - 0.5 K/uL    Baso # 0.03 0.00 - 0.20 K/uL    nRBC 0 0 /100 WBC    Gran % 49.7 38.0 - 73.0 %    Lymph % 41.5 18.0 - 48.0 %    Mono % 7.4 4.0 - 15.0 %    Eosinophil % 0.6 0.0 - 8.0 %    Basophil % 0.6 0.0 - 1.9 %    Differential Method Automated    COMPREHENSIVE METABOLIC PANEL   Result Value Ref Range    Sodium 141 136 - 145 mmol/L    Potassium 4.6 3.5 - 5.1 mmol/L    Chloride 106 95 - 110 mmol/L    CO2 25 23 - 29 mmol/L    Glucose 85 70 - 110 mg/dL    BUN 9 6 - 20 mg/dL    Creatinine 0.7 0.5 - 1.4 mg/dL    Calcium 9.8 8.7 - 10.5 mg/dL    Total Protein 7.4 6.0  - 8.4 g/dL    Albumin 4.5 3.5 - 5.2 g/dL    Total Bilirubin 0.3 0.1 - 1.0 mg/dL    Alkaline Phosphatase 33 (L) 55 - 135 U/L    AST 13 10 - 40 U/L    ALT 7 (L) 10 - 44 U/L    Anion Gap 10 8 - 16 mmol/L    eGFR >60.0 >60 mL/min/1.73 m^2   FERRITIN   Result Value Ref Range    Ferritin 84 20.0 - 300.0 ng/mL   IRON AND TIBC   Result Value Ref Range    Iron 96 30 - 160 ug/dL    Transferrin 227 200 - 375 mg/dL    TIBC 336 250 - 450 ug/dL    Saturated Iron 29 20 - 50 %        Assessment:       1. LASHONDA positive    2. NCGS (non-celiac gluten sensitivity)              Plan:       Diagnoses and all orders for this visit:    LASHONDA positive  -     Estrogens, Total; Future  -     PROGESTERONE; Future  -     LUTEINIZING HORMONE; Future  -     FOLLICLE STIMULATING HORMONE; Future  -     LASHONDA by IFA, w/Rflx; Future    NCGS (non-celiac gluten sensitivity)  -     Estrogens, Total; Future  -     PROGESTERONE; Future  -     LUTEINIZING HORMONE; Future  -     FOLLICLE STIMULATING HORMONE; Future  -     LASHONDA by IFA, w/Rflx; Future        The patient location is: home  The chief complaint leading to consultation is: lashonda pos    Visit type: audiovisual    Face to Face time with patient: 30  minutes of total time spent on the encounter, which includes face to face time and non-face to face time preparing to see the patient (eg, review of tests), Obtaining and/or reviewing separately obtained history, Documenting clinical information in the electronic or other health record, Independently interpreting results (not separately reported) and communicating results to the patient/family/caregiver, or Care coordination (not separately reported).         Each patient to whom he or she provides medical services by telemedicine is:  (1) informed of the relationship between the physician and patient and the respective role of any other health care provider with respect to management of the patient; and (2) notified that he or she may decline to receive  medical services by telemedicine and may withdraw from such care at any time.    Notes:

## 2022-10-17 ENCOUNTER — OFFICE VISIT (OUTPATIENT)
Dept: OPTOMETRY | Facility: CLINIC | Age: 32
End: 2022-10-17
Payer: COMMERCIAL

## 2022-10-17 ENCOUNTER — TELEPHONE (OUTPATIENT)
Dept: DERMATOLOGY | Facility: CLINIC | Age: 32
End: 2022-10-17
Payer: COMMERCIAL

## 2022-10-17 DIAGNOSIS — H52.223 REGULAR ASTIGMATISM OF BOTH EYES: ICD-10-CM

## 2022-10-17 DIAGNOSIS — H10.13 ALLERGIC CONJUNCTIVITIS OF BOTH EYES: Primary | ICD-10-CM

## 2022-10-17 PROCEDURE — 1159F MED LIST DOCD IN RCRD: CPT | Mod: CPTII,S$GLB,,

## 2022-10-17 PROCEDURE — 99999 PR PBB SHADOW E&M-EST. PATIENT-LVL II: ICD-10-PCS | Mod: PBBFAC,,,

## 2022-10-17 PROCEDURE — 92015 PR REFRACTION: ICD-10-PCS | Mod: S$GLB,,,

## 2022-10-17 PROCEDURE — 92014 PR EYE EXAM, EST PATIENT,COMPREHESV: ICD-10-PCS | Mod: S$GLB,,,

## 2022-10-17 PROCEDURE — 92015 DETERMINE REFRACTIVE STATE: CPT | Mod: S$GLB,,,

## 2022-10-17 PROCEDURE — 99999 PR PBB SHADOW E&M-EST. PATIENT-LVL II: CPT | Mod: PBBFAC,,,

## 2022-10-17 PROCEDURE — 92014 COMPRE OPH EXAM EST PT 1/>: CPT | Mod: S$GLB,,,

## 2022-10-17 PROCEDURE — 1159F PR MEDICATION LIST DOCUMENTED IN MEDICAL RECORD: ICD-10-PCS | Mod: CPTII,S$GLB,,

## 2022-10-17 NOTE — PROGRESS NOTES
HPI    New pt here for eye exam. Last exam- 5-7 yrs    Pt sts she thinks VA is slightly fuzzy. Pt sts she has wore glasses in the   past, has not worn since college. Pt denies flashes/floaters/pain. Pt   using OTC gtt for itching PRN.     Agree above  Pataday 1x/day, artificial tears bid  Last edited by Anna Marie Sneed, OD on 10/17/2022  9:40 AM.        ROS    Positive for: Eyes  Negative for: Constitutional, Gastrointestinal, Neurological, Skin,   Genitourinary, Musculoskeletal, HENT, Endocrine, Cardiovascular,   Respiratory, Psychiatric, Allergic/Imm, Heme/Lymph  Last edited by Anna Marie Sneed, OD on 10/17/2022  9:24 AM.        Assessment /Plan     For exam results, see Encounter Report.    Allergic conjunctivitis of both eyes    Regular astigmatism of both eyes    Ed pt on findings and recommended continued use of Pataday 1x/day along with artificial tears 2-3x/day, especially during allergy season. Recommended pt to use systemic OTC antihistamines to alleviate ocular symptoms as well. Ed pt to RTC if worsening of symptoms.  Ed pt on findings and nature of astigmatism. Released spec rx to pt and ed to wear as needed.    RTC: 1 year for comprehensive exam or sooner prn

## 2022-10-17 NOTE — TELEPHONE ENCOUNTER
----- Message from Damien Nash sent at 10/15/2022 11:18 AM CDT -----  Contact: Patient Portual  .Type:  Needs Medical Advice    Who Called: pt  Symptoms (please be specific):Rosacea  Would the patient rather a call back or a response via [employer]? Call back  Best Call Back Number: [phone]  Additional Information: Pt. Is requesting to be seen by a provider.

## 2022-11-18 ENCOUNTER — TELEPHONE (OUTPATIENT)
Dept: OTOLARYNGOLOGY | Facility: CLINIC | Age: 32
End: 2022-11-18
Payer: COMMERCIAL

## 2022-11-22 ENCOUNTER — OFFICE VISIT (OUTPATIENT)
Dept: OTOLARYNGOLOGY | Facility: CLINIC | Age: 32
End: 2022-11-22
Payer: COMMERCIAL

## 2022-11-22 VITALS — HEIGHT: 62 IN | BODY MASS INDEX: 20 KG/M2 | WEIGHT: 108.69 LBS

## 2022-11-22 DIAGNOSIS — R09.82 PND (POST-NASAL DRIP): ICD-10-CM

## 2022-11-22 DIAGNOSIS — J04.0 LARYNGITIS: Primary | ICD-10-CM

## 2022-11-22 DIAGNOSIS — R05.1 ACUTE COUGH: ICD-10-CM

## 2022-11-22 PROCEDURE — 3008F PR BODY MASS INDEX (BMI) DOCUMENTED: ICD-10-PCS | Mod: CPTII,S$GLB,, | Performed by: STUDENT IN AN ORGANIZED HEALTH CARE EDUCATION/TRAINING PROGRAM

## 2022-11-22 PROCEDURE — 99214 PR OFFICE/OUTPT VISIT, EST, LEVL IV, 30-39 MIN: ICD-10-PCS | Mod: 25,S$GLB,, | Performed by: STUDENT IN AN ORGANIZED HEALTH CARE EDUCATION/TRAINING PROGRAM

## 2022-11-22 PROCEDURE — 31575 DIAGNOSTIC LARYNGOSCOPY: CPT | Mod: S$GLB,,, | Performed by: STUDENT IN AN ORGANIZED HEALTH CARE EDUCATION/TRAINING PROGRAM

## 2022-11-22 PROCEDURE — 99999 PR PBB SHADOW E&M-EST. PATIENT-LVL II: CPT | Mod: PBBFAC,,, | Performed by: STUDENT IN AN ORGANIZED HEALTH CARE EDUCATION/TRAINING PROGRAM

## 2022-11-22 PROCEDURE — 99214 OFFICE O/P EST MOD 30 MIN: CPT | Mod: 25,S$GLB,, | Performed by: STUDENT IN AN ORGANIZED HEALTH CARE EDUCATION/TRAINING PROGRAM

## 2022-11-22 PROCEDURE — 1159F PR MEDICATION LIST DOCUMENTED IN MEDICAL RECORD: ICD-10-PCS | Mod: CPTII,S$GLB,, | Performed by: STUDENT IN AN ORGANIZED HEALTH CARE EDUCATION/TRAINING PROGRAM

## 2022-11-22 PROCEDURE — 1159F MED LIST DOCD IN RCRD: CPT | Mod: CPTII,S$GLB,, | Performed by: STUDENT IN AN ORGANIZED HEALTH CARE EDUCATION/TRAINING PROGRAM

## 2022-11-22 PROCEDURE — 3008F BODY MASS INDEX DOCD: CPT | Mod: CPTII,S$GLB,, | Performed by: STUDENT IN AN ORGANIZED HEALTH CARE EDUCATION/TRAINING PROGRAM

## 2022-11-22 PROCEDURE — 99999 PR PBB SHADOW E&M-EST. PATIENT-LVL II: ICD-10-PCS | Mod: PBBFAC,,, | Performed by: STUDENT IN AN ORGANIZED HEALTH CARE EDUCATION/TRAINING PROGRAM

## 2022-11-22 PROCEDURE — 31575 PR LARYNGOSCOPY, FLEXIBLE; DIAGNOSTIC: ICD-10-PCS | Mod: S$GLB,,, | Performed by: STUDENT IN AN ORGANIZED HEALTH CARE EDUCATION/TRAINING PROGRAM

## 2022-11-22 RX ORDER — BENZONATATE 100 MG/1
100 CAPSULE ORAL 3 TIMES DAILY PRN
Qty: 30 CAPSULE | Refills: 2 | Status: SHIPPED | OUTPATIENT
Start: 2022-11-22 | End: 2022-12-02

## 2022-11-22 RX ORDER — FLUTICASONE PROPIONATE 50 MCG
1 SPRAY, SUSPENSION (ML) NASAL 2 TIMES DAILY
Qty: 16 G | Refills: 11 | Status: SHIPPED | OUTPATIENT
Start: 2022-11-22 | End: 2023-11-15

## 2022-11-22 RX ORDER — AZELASTINE 1 MG/ML
1 SPRAY, METERED NASAL 2 TIMES DAILY
Qty: 30 ML | Refills: 11 | Status: SHIPPED | OUTPATIENT
Start: 2022-11-22 | End: 2023-12-19

## 2022-11-22 RX ORDER — DIPHENHYDRAMINE HCL 50 MG
CAPSULE ORAL
COMMUNITY
End: 2022-11-22

## 2022-11-22 NOTE — PROGRESS NOTES
Otolaryngology Clinic Note    Subjective:       Patient ID: Cheryl Peter is a 32 y.o. female.    Chief Complaint: Cough (Voice change been going on for 2 weeks)      History of Present Illness: Cheryl Peter is a 32 y.o. female presenting with cough and hoarseness for 2 weeks. Was working [employer], 2 sat ago, intermediate through shift, very raspy,  was at [employer]. Got prednisone 5 days 20 mg and polytussin. Still coughing and still hoarse. No fevers. Flu and COVID negative. Cough is not productive. No sinus pressure, no congested. Pressure above her eyes when she coughs a lot. No asthma, non smoker.   Saw [facility] 2021 for similar. CT with left guero, DNS, grossly normal.   Thinks she is using flonase daily. Helps with congestion.       Past Surgical History:   Procedure Laterality Date    ADENOIDECTOMY      ankle arthroscopy       SECTION      DILATION AND CURETTAGE OF UTERUS      TONSILLECTOMY       Past Medical History:   Diagnosis Date    Fibromyalgia      Social Determinants of Health     Tobacco Use: Low Risk     Smoking Tobacco Use: Never    Smokeless Tobacco Use: Never    Passive Exposure: Not on file   Alcohol Use: Not on file   Financial Resource Strain: Not on file   Food Insecurity: Not on file   Transportation Needs: Not on file   Physical Activity: Not on file   Stress: Not on file   Social Connections: Not on file   Housing Stability: Not on file   Depression: Not on file     Review of patient's allergies indicates:   Allergen Reactions    Penicillins Hives and Rash     Current Outpatient Medications   Medication Instructions    azelastine (ASTELIN) 137 mcg, Nasal, 2 times daily    benzonatate (TESSALON) 100 mg, Oral, 3 times daily PRN    fluticasone propionate (FLONASE) 50 mcg, Each Nostril, 2 times daily    progesterone (PROMETRIUM) 200 MG capsule PLACE 1 CAPSULE VAGINALLY 2 DAYS AFTER POSITIVE OPK         14 point ROS below  Answers submitted by the patient for this  visit:  Review of Symptoms Questionnaire  (Submitted on 11/21/2022)  Fatigue (Tiredness)?: Yes  postnasal drip: Yes  sore throat: Yes  Voice Change?: Yes  None of these : Yes  cough: Yes  None of these : Yes  None of these: Yes  None of these: Yes  Muscle aches / pain?: Yes  None of these : Yes  None of these: Yes  None of these : Yes  headaches: Yes  None of these: Yes  sleep disturbance: Yes                Objective:      There were no vitals filed for this visit.    General: NAD, well appearing  Eyes: Normal conjunctiva and lids  Face: symmetric, nerve intact  Nose: The nose is without any evidence of any deformity. The nasal mucosa is moist. The septum is midline. There is no evidence of septal hematoma. The turbinates are without abnormality.   Ears: The ears are with normal-appearing pinna. Examination of the canals is normal appearing bilaterally. There is no drainage or erythema noted. The tympanic membranes are normal appearing with pearly color, normal-appearing landmarks and normal light reflex. Hearing is grossly intact.  Mouth: No obvious abnormalities to the lips. The teeth are unremarkable. The gingivae are without any obvious evidence of infection or lesion. The oral mucosa is moist and pink. There are no obvious masses to the hard or soft palate.   Oropharynx: The uvula is midline.  The tongue is midline. The posterior pharynx has cobblestoning. The tonsils are normal appearing.  Salivary glands: The salivary glands are symmetric and not enlarged, no masses  Neck: No lymphadenopathy, trachea midline, thryoid not enlarged.  Psych: Normal mood and affect.   Neuro: Grossly intact  Speech: fluent    Procedure: Flexible laryngoscopy  Indications: dysphonia, cough  Verbal consent obtained  Anesthesia: lidocaine and phenylephrine nasal spray  Procedure: Scope was passed into right or left nare, to nasopharynx and down to visualize the glottis. Findings below. Patient tolerated procedure well.     - Nose  with thick clear rhinorrhea PND  - Nasopharynx- WNL, no masses, cobblestoning  - Oropharynx- BOT symmetric, no masses  - Hypopharynx and piriform sinuses- no masses on trumpet maneuver  - Supraglottis- Arytenoids intact, no masses, not edematous or erythematous  - Glottis- Bilateral vocal folds intact with full motion, inflamed with calluses at anterior 1/3-2/3 from vocal trauma  - Glottic closure- complete       Assessment and Plan:       1. Laryngitis    2. PND (post-nasal drip)    3. Acute cough        Scope shows vocal cord trauma  Already had steroids  Recommend flonase and astelin BID for PND  Zyrtec daily for now, then PRN  Humidify as able, nasal saline sprays  Try to rest voice  Tessalon for cough    Has pepcid at home, take daily to twice a day for 2 weeks  Drink lots of water    RTC: PRN    Plan of care was discussed in detail with the patient, who agreed with the plan as above. All questions were answered in detail.     Cookie Hightower MD  Otolaryngology ic

## 2022-11-28 ENCOUNTER — PATIENT MESSAGE (OUTPATIENT)
Dept: OTOLARYNGOLOGY | Facility: CLINIC | Age: 32
End: 2022-11-28
Payer: COMMERCIAL

## 2022-11-28 ENCOUNTER — PATIENT MESSAGE (OUTPATIENT)
Dept: RHEUMATOLOGY | Facility: CLINIC | Age: 32
End: 2022-11-28
Payer: COMMERCIAL

## 2022-11-28 RX ORDER — CODEINE PHOSPHATE AND GUAIFENESIN 10; 100 MG/5ML; MG/5ML
10 SOLUTION ORAL EVERY 6 HOURS PRN
Qty: 473 ML | Refills: 0 | Status: SHIPPED | OUTPATIENT
Start: 2022-11-28 | End: 2022-12-08

## 2022-11-29 ENCOUNTER — PATIENT MESSAGE (OUTPATIENT)
Dept: OTOLARYNGOLOGY | Facility: CLINIC | Age: 32
End: 2022-11-29
Payer: COMMERCIAL

## 2022-11-30 ENCOUNTER — PATIENT MESSAGE (OUTPATIENT)
Dept: FAMILY MEDICINE | Facility: CLINIC | Age: 32
End: 2022-11-30
Payer: COMMERCIAL

## 2022-12-06 ENCOUNTER — OFFICE VISIT (OUTPATIENT)
Dept: FAMILY MEDICINE | Facility: CLINIC | Age: 32
End: 2022-12-06
Payer: COMMERCIAL

## 2022-12-06 VITALS
WEIGHT: 107.81 LBS | SYSTOLIC BLOOD PRESSURE: 98 MMHG | HEIGHT: 62 IN | OXYGEN SATURATION: 98 % | TEMPERATURE: 98 F | DIASTOLIC BLOOD PRESSURE: 72 MMHG | BODY MASS INDEX: 19.84 KG/M2 | HEART RATE: 88 BPM

## 2022-12-06 DIAGNOSIS — J22 LOWER RESPIRATORY INFECTION: Primary | ICD-10-CM

## 2022-12-06 PROCEDURE — 3074F SYST BP LT 130 MM HG: CPT | Mod: CPTII,S$GLB,, | Performed by: NURSE PRACTITIONER

## 2022-12-06 PROCEDURE — 1159F MED LIST DOCD IN RCRD: CPT | Mod: CPTII,S$GLB,, | Performed by: NURSE PRACTITIONER

## 2022-12-06 PROCEDURE — 1160F PR REVIEW ALL MEDS BY PRESCRIBER/CLIN PHARMACIST DOCUMENTED: ICD-10-PCS | Mod: CPTII,S$GLB,, | Performed by: NURSE PRACTITIONER

## 2022-12-06 PROCEDURE — 3008F BODY MASS INDEX DOCD: CPT | Mod: CPTII,S$GLB,, | Performed by: NURSE PRACTITIONER

## 2022-12-06 PROCEDURE — 1160F RVW MEDS BY RX/DR IN RCRD: CPT | Mod: CPTII,S$GLB,, | Performed by: NURSE PRACTITIONER

## 2022-12-06 PROCEDURE — 3074F PR MOST RECENT SYSTOLIC BLOOD PRESSURE < 130 MM HG: ICD-10-PCS | Mod: CPTII,S$GLB,, | Performed by: NURSE PRACTITIONER

## 2022-12-06 PROCEDURE — 99999 PR PBB SHADOW E&M-EST. PATIENT-LVL III: CPT | Mod: PBBFAC,,, | Performed by: NURSE PRACTITIONER

## 2022-12-06 PROCEDURE — 1159F PR MEDICATION LIST DOCUMENTED IN MEDICAL RECORD: ICD-10-PCS | Mod: CPTII,S$GLB,, | Performed by: NURSE PRACTITIONER

## 2022-12-06 PROCEDURE — 3078F DIAST BP <80 MM HG: CPT | Mod: CPTII,S$GLB,, | Performed by: NURSE PRACTITIONER

## 2022-12-06 PROCEDURE — 99214 OFFICE O/P EST MOD 30 MIN: CPT | Mod: S$GLB,,, | Performed by: NURSE PRACTITIONER

## 2022-12-06 PROCEDURE — 3078F PR MOST RECENT DIASTOLIC BLOOD PRESSURE < 80 MM HG: ICD-10-PCS | Mod: CPTII,S$GLB,, | Performed by: NURSE PRACTITIONER

## 2022-12-06 PROCEDURE — 99999 PR PBB SHADOW E&M-EST. PATIENT-LVL III: ICD-10-PCS | Mod: PBBFAC,,, | Performed by: NURSE PRACTITIONER

## 2022-12-06 PROCEDURE — 99214 PR OFFICE/OUTPT VISIT, EST, LEVL IV, 30-39 MIN: ICD-10-PCS | Mod: S$GLB,,, | Performed by: NURSE PRACTITIONER

## 2022-12-06 PROCEDURE — 3008F PR BODY MASS INDEX (BMI) DOCUMENTED: ICD-10-PCS | Mod: CPTII,S$GLB,, | Performed by: NURSE PRACTITIONER

## 2022-12-06 RX ORDER — MONTELUKAST SODIUM 10 MG/1
10 TABLET ORAL NIGHTLY
Qty: 30 TABLET | Refills: 0 | Status: SHIPPED | OUTPATIENT
Start: 2022-12-06 | End: 2023-01-05

## 2022-12-06 RX ORDER — BUDESONIDE AND FORMOTEROL FUMARATE DIHYDRATE 80; 4.5 UG/1; UG/1
2 AEROSOL RESPIRATORY (INHALATION) DAILY
Qty: 6.9 G | Refills: 0 | Status: SHIPPED | OUTPATIENT
Start: 2022-12-06 | End: 2023-03-28

## 2022-12-06 RX ORDER — ALBUTEROL SULFATE 90 UG/1
2 AEROSOL, METERED RESPIRATORY (INHALATION) EVERY 6 HOURS PRN
Qty: 18 G | Refills: 0 | Status: SHIPPED | OUTPATIENT
Start: 2022-12-06 | End: 2023-03-28

## 2022-12-06 RX ORDER — DOXYCYCLINE 100 MG/1
100 CAPSULE ORAL 2 TIMES DAILY
Qty: 14 CAPSULE | Refills: 0 | Status: SHIPPED | OUTPATIENT
Start: 2022-12-06 | End: 2022-12-13

## 2022-12-06 NOTE — PROGRESS NOTES
Subjective:       Patient ID: Cheryl Peter is a 32 y.o. female.    Chief Complaint: Cough, Fever, and URI    In early Nov started with cough and laryngitis 22 went to urgent care and treated with prednisone 20 mg bid x 5 days.   saw ent, did scope, had calluses on vocal chord, gave her tessalon perles and pepcid which did not help.  sent a message to ENT and was prescribed cough medication with covid. Had fever 10 days ago, felt ok otherwise other than the cough. Had fever until Friday, went to Urgent Care Friday COVID and flu negative. Chest xray done. Treated with Augmentin, nothing else, continued with phenergan dm. Cough is less frequent but having increased chest congestion. Still on augmentin, flonase, astelin, pepcid, and phenergan dm daily.    Past Medical History:  No date: Fibromyalgia    Past Surgical History:  No date: ADENOIDECTOMY  No date: ankle arthroscopy  No date:  SECTION  No date: DILATION AND CURETTAGE OF UTERUS  No date: TONSILLECTOMY    Review of patient's family history indicates:      Social History    Socioeconomic History      Marital status:     Tobacco Use      Smoking status: Never      Smokeless tobacco: Never    Substance and Sexual Activity      Alcohol use: Yes        Comment: socially      Drug use: Never      Sexual activity: Yes        Partners: Male        Birth control/protection: I.U.D.      Current Outpatient Medications:  azelastine (ASTELIN) 137 mcg (0.1 %) nasal spray, 1 spray (137 mcg total) by Nasal route 2 (two) times daily., Disp: 30 mL, Rfl: 11  fluticasone propionate (FLONASE) 50 mcg/actuation nasal spray, 1 spray (50 mcg total) by Each Nostril route 2 (two) times a day., Disp: 16 g, Rfl: 11  guaiFENesin-codeine 100-10 mg/5 ml (TUSSI-ORGANIDIN NR)  mg/5 mL syrup, Take 10 mLs by mouth every 6 (six) hours as needed for Cough., Disp: 473 mL, Rfl: 0  progesterone (PROMETRIUM) 200 MG capsule, PLACE 1 CAPSULE VAGINALLY 2 DAYS  AFTER POSITIVE OPK, Disp: , Rfl:     No current facility-administered medications for this visit.      Review of patient's allergies indicates:   -- Penicillins -- Hives and Rash     Cough  Associated symptoms include a fever, postnasal drip and rhinorrhea. Pertinent negatives include no ear pain, sore throat or wheezing.   Fever   Associated symptoms include congestion and coughing. Pertinent negatives include no ear pain, sore throat or wheezing.   URI   Associated symptoms include congestion, coughing and rhinorrhea. Pertinent negatives include no ear pain, sore throat or wheezing.   Review of Systems   Constitutional:  Positive for fever.   HENT:  Positive for nasal congestion, postnasal drip and rhinorrhea. Negative for ear pain, nosebleeds, sore throat and voice change.    Respiratory:  Positive for cough. Negative for chest tightness and wheezing.    Integumentary:  Negative.   Neurological: Negative.        Objective:      Physical Exam  Constitutional:       General: She is not in acute distress.     Appearance: Normal appearance.   HENT:      Head: Normocephalic and atraumatic.      Nose: Rhinorrhea present.      Mouth/Throat:      Pharynx: Posterior oropharyngeal erythema present.   Eyes:      Pupils: Pupils are equal, round, and reactive to light.   Cardiovascular:      Rate and Rhythm: Normal rate and regular rhythm.   Pulmonary:      Effort: Pulmonary effort is normal. No respiratory distress.      Breath sounds: Normal breath sounds.   Neurological:      General: No focal deficit present.      Mental Status: She is alert and oriented to person, place, and time.   Psychiatric:         Mood and Affect: Mood normal.         Behavior: Behavior normal.       Assessment:       Problem List Items Addressed This Visit    None  Visit Diagnoses       Lower respiratory infection    -  Primary    Relevant Medications    albuterol (VENTOLIN HFA) 90 mcg/actuation inhaler    montelukast (SINGULAIR) 10 mg tablet     budesonide-formoterol 80-4.5 mcg (SYMBICORT) 80-4.5 mcg/actuation HFAA    doxycycline (MONODOX) 100 MG capsule            Plan:       1. Lower respiratory infection  7 days of doxycycline, singulair x 30 days, symbicort daily, albuterol 3 times daily, cotninue pepcid and nasal sprays.   - albuterol (VENTOLIN HFA) 90 mcg/actuation inhaler; Inhale 2 puffs into the lungs every 6 (six) hours as needed for Wheezing. Rescue  Dispense: 18 g; Refill: 0  - montelukast (SINGULAIR) 10 mg tablet; Take 1 tablet (10 mg total) by mouth every evening.  Dispense: 30 tablet; Refill: 0  - budesonide-formoterol 80-4.5 mcg (SYMBICORT) 80-4.5 mcg/actuation HFAA; Inhale 2 puffs into the lungs once daily. Controller  Dispense: 6.9 g; Refill: 0  - doxycycline (MONODOX) 100 MG capsule; Take 1 capsule (100 mg total) by mouth 2 (two) times daily. for 7 days  Dispense: 14 capsule; Refill: 0

## 2022-12-07 ENCOUNTER — LAB VISIT (OUTPATIENT)
Dept: LAB | Facility: HOSPITAL | Age: 32
End: 2022-12-07
Attending: INTERNAL MEDICINE
Payer: COMMERCIAL

## 2022-12-07 DIAGNOSIS — R76.8 ANA POSITIVE: ICD-10-CM

## 2022-12-07 DIAGNOSIS — K90.41 NCGS (NON-CELIAC GLUTEN SENSITIVITY): ICD-10-CM

## 2022-12-07 LAB
FSH SERPL-ACNC: 4.92 MIU/ML
LH SERPL-ACNC: 2.2 MIU/ML
PROGEST SERPL-MCNC: 5 NG/ML

## 2022-12-07 PROCEDURE — 86038 ANTINUCLEAR ANTIBODIES: CPT | Performed by: INTERNAL MEDICINE

## 2022-12-07 PROCEDURE — 83002 ASSAY OF GONADOTROPIN (LH): CPT | Performed by: INTERNAL MEDICINE

## 2022-12-07 PROCEDURE — 82672 ASSAY OF ESTROGEN: CPT | Performed by: INTERNAL MEDICINE

## 2022-12-07 PROCEDURE — 84144 ASSAY OF PROGESTERONE: CPT | Performed by: INTERNAL MEDICINE

## 2022-12-07 PROCEDURE — 83001 ASSAY OF GONADOTROPIN (FSH): CPT | Performed by: INTERNAL MEDICINE

## 2022-12-07 PROCEDURE — 86039 ANTINUCLEAR ANTIBODIES (ANA): CPT | Performed by: INTERNAL MEDICINE

## 2022-12-07 PROCEDURE — 36415 COLL VENOUS BLD VENIPUNCTURE: CPT | Mod: PO | Performed by: INTERNAL MEDICINE

## 2022-12-08 LAB
ANA PATTERN 1: NORMAL
ANA SER-ACNC: POSITIVE
ANA TITR SER IF: NORMAL {TITER}

## 2022-12-09 ENCOUNTER — OFFICE VISIT (OUTPATIENT)
Dept: RHEUMATOLOGY | Facility: CLINIC | Age: 32
End: 2022-12-09
Payer: COMMERCIAL

## 2022-12-09 VITALS
WEIGHT: 108 LBS | SYSTOLIC BLOOD PRESSURE: 97 MMHG | HEART RATE: 71 BPM | BODY MASS INDEX: 19.88 KG/M2 | HEIGHT: 62 IN | DIASTOLIC BLOOD PRESSURE: 60 MMHG

## 2022-12-09 DIAGNOSIS — K90.41 NCGS (NON-CELIAC GLUTEN SENSITIVITY): ICD-10-CM

## 2022-12-09 DIAGNOSIS — R76.8 ANA POSITIVE: Primary | ICD-10-CM

## 2022-12-09 DIAGNOSIS — M79.7 FIBROMYALGIA: ICD-10-CM

## 2022-12-09 PROCEDURE — 99999 PR PBB SHADOW E&M-EST. PATIENT-LVL III: CPT | Mod: PBBFAC,,, | Performed by: PHYSICIAN ASSISTANT

## 2022-12-09 PROCEDURE — 3078F PR MOST RECENT DIASTOLIC BLOOD PRESSURE < 80 MM HG: ICD-10-PCS | Mod: CPTII,S$GLB,, | Performed by: PHYSICIAN ASSISTANT

## 2022-12-09 PROCEDURE — 1160F PR REVIEW ALL MEDS BY PRESCRIBER/CLIN PHARMACIST DOCUMENTED: ICD-10-PCS | Mod: CPTII,S$GLB,, | Performed by: PHYSICIAN ASSISTANT

## 2022-12-09 PROCEDURE — 1160F RVW MEDS BY RX/DR IN RCRD: CPT | Mod: CPTII,S$GLB,, | Performed by: PHYSICIAN ASSISTANT

## 2022-12-09 PROCEDURE — 99214 OFFICE O/P EST MOD 30 MIN: CPT | Mod: S$GLB,,, | Performed by: PHYSICIAN ASSISTANT

## 2022-12-09 PROCEDURE — 3074F SYST BP LT 130 MM HG: CPT | Mod: CPTII,S$GLB,, | Performed by: PHYSICIAN ASSISTANT

## 2022-12-09 PROCEDURE — 99999 PR PBB SHADOW E&M-EST. PATIENT-LVL III: ICD-10-PCS | Mod: PBBFAC,,, | Performed by: PHYSICIAN ASSISTANT

## 2022-12-09 PROCEDURE — 1159F PR MEDICATION LIST DOCUMENTED IN MEDICAL RECORD: ICD-10-PCS | Mod: CPTII,S$GLB,, | Performed by: PHYSICIAN ASSISTANT

## 2022-12-09 PROCEDURE — 3078F DIAST BP <80 MM HG: CPT | Mod: CPTII,S$GLB,, | Performed by: PHYSICIAN ASSISTANT

## 2022-12-09 PROCEDURE — 99214 PR OFFICE/OUTPT VISIT, EST, LEVL IV, 30-39 MIN: ICD-10-PCS | Mod: S$GLB,,, | Performed by: PHYSICIAN ASSISTANT

## 2022-12-09 PROCEDURE — 1159F MED LIST DOCD IN RCRD: CPT | Mod: CPTII,S$GLB,, | Performed by: PHYSICIAN ASSISTANT

## 2022-12-09 PROCEDURE — 3008F BODY MASS INDEX DOCD: CPT | Mod: CPTII,S$GLB,, | Performed by: PHYSICIAN ASSISTANT

## 2022-12-09 PROCEDURE — 3074F PR MOST RECENT SYSTOLIC BLOOD PRESSURE < 130 MM HG: ICD-10-PCS | Mod: CPTII,S$GLB,, | Performed by: PHYSICIAN ASSISTANT

## 2022-12-09 PROCEDURE — 3008F PR BODY MASS INDEX (BMI) DOCUMENTED: ICD-10-PCS | Mod: CPTII,S$GLB,, | Performed by: PHYSICIAN ASSISTANT

## 2022-12-09 NOTE — PROGRESS NOTES
Subjective:       Patient ID: Cheryl ePter is a 32 y.o. female.    Chief Complaint: Disease Management    Mrs. Peter is a 32 year old female who presents to clinic for follow up on MERRY positivity and gluten sensitivity. She is a new patient to me. She cut out gluten in mid October and within 1 month noticed a significant improvement in her arthralgias, rash, and fatigue. She has eaten more gluten this week and she does feel her sx have returned. She primarily has pain in her neck, shoulders, thoracic spine, lumbar spine, and hips. Denies peripheral joint pain.     She is recovering from respiratory infection currently on doxycycline.    She has intermittent numbness/burning that involves medial border scapula on the L side, which is unchanged since 2020. Eval w/pain management with MRI was unrevealing. PT x 6 months with no resolution of sx. If she moves a certain way pain/numbness will dissipate.     Sleep is poor--difficulty with sleep initiation. Failed ambien, trazodone, flexeril (caused tachycardia) in the past.     Labs reviewed--MERRY 1:80 speckled. Planning to try to conceive in April 2023.    Review of Systems   Constitutional:  Negative for fever and unexpected weight change.   HENT:  Negative for mouth sores and trouble swallowing.    Eyes:  Negative for redness.   Respiratory:  Positive for cough. Negative for shortness of breath.    Cardiovascular:  Negative for chest pain.   Gastrointestinal:  Negative for constipation and diarrhea.   Genitourinary:  Negative for dysuria and genital sores.   Musculoskeletal:  Positive for arthralgias, back pain, myalgias and neck pain.   Skin:  Negative for rash.   Neurological:  Positive for numbness. Negative for headaches.   Hematological:  Does not bruise/bleed easily.       Objective:     Vitals:    12/09/22 1413   BP: 97/60   Pulse: 71       Past Medical History:   Diagnosis Date    Fibromyalgia      Past Surgical History:   Procedure Laterality Date     ADENOIDECTOMY      ankle arthroscopy       SECTION      DILATION AND CURETTAGE OF UTERUS      TONSILLECTOMY            Physical Exam   Eyes: Right conjunctiva is not injected. Left conjunctiva is not injected.   Neck: No JVD present. No thyromegaly present.   Cardiovascular: Normal rate and regular rhythm. Exam reveals no decreased pulses.   Pulmonary/Chest: Effort normal.   Musculoskeletal:      Right shoulder: Normal.      Left shoulder: Normal.      Right elbow: Normal.      Left elbow: Normal.      Right wrist: Normal.      Left wrist: Normal.      Right knee: Normal.      Left knee: Normal.   Lymphadenopathy:     She has no cervical adenopathy.   Neurological: Gait normal.   Skin: No purpura and no rash noted.   Psychiatric: Mood and affect normal.       Right Side Rheumatological Exam     Examination finds the shoulder, elbow, wrist, knee, 1st PIP, 1st MCP, 2nd PIP, 2nd MCP, 3rd PIP, 3rd MCP, 4th PIP, 4th MCP, 5th PIP and 5th MCP normal.    Left Side Rheumatological Exam     Examination finds the shoulder, elbow, wrist, knee, 1st PIP, 1st MCP, 2nd PIP, 2nd MCP, 3rd PIP, 3rd MCP, 4th PIP, 4th MCP, 5th PIP and 5th MCP normal.      Back/Neck Exam   Tenderness Right paramedian tenderness of the Upper T-Spine, Lower C-Spine and Upper C-Spine.Left paramedian tenderness of the Upper T-Spine, Lower C-Spine and Upper C-Spine.      Labs:  Component      Latest Ref Rng & Units 2022   Progesterone      See Text ng/mL 5.0   LH      See Text mIU/mL 2.2   FSH      See Text mIU/mL 4.92   MERRY      Negative <1:80 Positive (A)   MERRY PATTERN 1       Speckled   MERRY Titer 1       1:80     Assessment:       1. MERRY positive    2. NCGS (non-celiac gluten sensitivity)    3. Fibromyalgia            Plan:       MERRY positive    NCGS (non-celiac gluten sensitivity)    Fibromyalgia        Assessment:  32 year old female with   MERRY 1:80 speckled, prior diagnosis of fibromyalgia  --insomnia  --non-celiac gluten  sensitivity  --MTHFR mutation    Plan:  Cont gluten free diet  Consider elavil 10 mg in the future for sleep/paresthesias    Follow up:  3 months Dr. Hampton, no need for additional labs at this time

## 2022-12-12 LAB — ESTROGEN SERPL-MCNC: 174 PG/ML

## 2022-12-21 ENCOUNTER — PATIENT MESSAGE (OUTPATIENT)
Dept: ADMINISTRATIVE | Facility: HOSPITAL | Age: 32
End: 2022-12-21
Payer: COMMERCIAL

## 2022-12-21 ENCOUNTER — PATIENT OUTREACH (OUTPATIENT)
Dept: ADMINISTRATIVE | Facility: HOSPITAL | Age: 32
End: 2022-12-21
Payer: COMMERCIAL

## 2022-12-25 ENCOUNTER — PATIENT MESSAGE (OUTPATIENT)
Dept: FAMILY MEDICINE | Facility: CLINIC | Age: 32
End: 2022-12-25
Payer: COMMERCIAL

## 2022-12-27 RX ORDER — FLUCONAZOLE 150 MG/1
150 TABLET ORAL DAILY
Qty: 1 TABLET | Refills: 0 | Status: SHIPPED | OUTPATIENT
Start: 2022-12-27 | End: 2022-12-28

## 2023-01-23 ENCOUNTER — OFFICE VISIT (OUTPATIENT)
Dept: NEUROSURGERY | Facility: CLINIC | Age: 33
End: 2023-01-23
Payer: COMMERCIAL

## 2023-01-23 VITALS
HEIGHT: 62 IN | WEIGHT: 108 LBS | DIASTOLIC BLOOD PRESSURE: 64 MMHG | HEART RATE: 75 BPM | BODY MASS INDEX: 19.88 KG/M2 | RESPIRATION RATE: 18 BRPM | SYSTOLIC BLOOD PRESSURE: 118 MMHG

## 2023-01-23 DIAGNOSIS — M47.812 CERVICAL SPONDYLOSIS: Primary | ICD-10-CM

## 2023-01-23 PROCEDURE — 1159F PR MEDICATION LIST DOCUMENTED IN MEDICAL RECORD: ICD-10-PCS | Mod: CPTII,S$GLB,, | Performed by: PHYSICIAN ASSISTANT

## 2023-01-23 PROCEDURE — 99204 OFFICE O/P NEW MOD 45 MIN: CPT | Mod: S$GLB,,, | Performed by: PHYSICIAN ASSISTANT

## 2023-01-23 PROCEDURE — 3008F PR BODY MASS INDEX (BMI) DOCUMENTED: ICD-10-PCS | Mod: CPTII,S$GLB,, | Performed by: PHYSICIAN ASSISTANT

## 2023-01-23 PROCEDURE — 3008F BODY MASS INDEX DOCD: CPT | Mod: CPTII,S$GLB,, | Performed by: PHYSICIAN ASSISTANT

## 2023-01-23 PROCEDURE — 3074F SYST BP LT 130 MM HG: CPT | Mod: CPTII,S$GLB,, | Performed by: PHYSICIAN ASSISTANT

## 2023-01-23 PROCEDURE — 3074F PR MOST RECENT SYSTOLIC BLOOD PRESSURE < 130 MM HG: ICD-10-PCS | Mod: CPTII,S$GLB,, | Performed by: PHYSICIAN ASSISTANT

## 2023-01-23 PROCEDURE — 1159F MED LIST DOCD IN RCRD: CPT | Mod: CPTII,S$GLB,, | Performed by: PHYSICIAN ASSISTANT

## 2023-01-23 PROCEDURE — 3078F DIAST BP <80 MM HG: CPT | Mod: CPTII,S$GLB,, | Performed by: PHYSICIAN ASSISTANT

## 2023-01-23 PROCEDURE — 3078F PR MOST RECENT DIASTOLIC BLOOD PRESSURE < 80 MM HG: ICD-10-PCS | Mod: CPTII,S$GLB,, | Performed by: PHYSICIAN ASSISTANT

## 2023-01-23 PROCEDURE — 99204 PR OFFICE/OUTPT VISIT, NEW, LEVL IV, 45-59 MIN: ICD-10-PCS | Mod: S$GLB,,, | Performed by: PHYSICIAN ASSISTANT

## 2023-01-23 NOTE — PROGRESS NOTES
Neurosurgery History & Physical    Patient ID: Cheryl Peter is a 32 y.o. female.    Chief Complaint   Patient presents with    Cervical Spine Pain (C-spine)     Patient present to clinic today with cervical spine pain. Patient states of 3 years with pain. Increasing pain/symptoms. Numbness/tingling most present in Lt shoulder area.        Review of Systems   Constitutional:  Negative for chills, diaphoresis, fatigue and fever.   HENT:  Negative for congestion, ear pain, rhinorrhea, sneezing, sore throat and tinnitus.    Eyes:  Negative for photophobia, pain, redness and visual disturbance.   Respiratory:  Negative for cough, chest tightness, shortness of breath and wheezing.    Cardiovascular:  Negative for chest pain, palpitations and leg swelling.   Gastrointestinal:  Negative for abdominal distention, abdominal pain, constipation, diarrhea, nausea and vomiting.   Genitourinary:  Negative for difficulty urinating, dysuria, frequency and urgency.   Musculoskeletal:  Positive for myalgias and neck pain. Negative for back pain and gait problem.   Skin:  Negative for pallor and rash.   Neurological:  Negative for dizziness, seizures, speech difficulty, weakness, numbness and headaches.   Psychiatric/Behavioral:  Negative for confusion and hallucinations.      Past Medical History:   Diagnosis Date    Fibromyalgia      Social History     Socioeconomic History    Marital status:    Tobacco Use    Smoking status: Never    Smokeless tobacco: Never   Substance and Sexual Activity    Alcohol use: Yes     Comment: socially    Drug use: Never    Sexual activity: Yes     Partners: Male     Birth control/protection: I.U.D.     Family History   Problem Relation Age of Onset    Diabetes Mother     Hyperlipidemia Mother     Miscarriages / Stillbirths Mother     Heart disease Father     Hyperlipidemia Father     Diabetes Father     Depression Father     Cancer Maternal Grandmother     Arthritis Maternal Grandmother   "   Miscarriages / Stillbirths Maternal Grandmother     Cancer Maternal Grandfather     Glaucoma Neg Hx     Macular degeneration Neg Hx      Review of patient's allergies indicates:   Allergen Reactions    Penicillins Hives and Rash       Current Outpatient Medications:     azelastine (ASTELIN) 137 mcg (0.1 %) nasal spray, 1 spray (137 mcg total) by Nasal route 2 (two) times daily., Disp: 30 mL, Rfl: 11    fluticasone propionate (FLONASE) 50 mcg/actuation nasal spray, 1 spray (50 mcg total) by Each Nostril route 2 (two) times a day., Disp: 16 g, Rfl: 11    progesterone (PROMETRIUM) 200 MG capsule, PLACE 1 CAPSULE VAGINALLY 2 DAYS AFTER POSITIVE OPK, Disp: , Rfl:     albuterol (VENTOLIN HFA) 90 mcg/actuation inhaler, Inhale 2 puffs into the lungs every 6 (six) hours as needed for Wheezing. Rescue, Disp: 18 g, Rfl: 0    budesonide-formoterol 80-4.5 mcg (SYMBICORT) 80-4.5 mcg/actuation HFAA, Inhale 2 puffs into the lungs once daily. Controller, Disp: 6.9 g, Rfl: 0  Blood pressure 118/64, pulse 75, resp. rate 18, height 5' 2" (1.575 m), weight 49 kg (108 lb).      Neurologic Exam     Mental Status   Oriented to person, place, and time.   Oriented to person.   Oriented to place.   Oriented to time.   Follows 3 step commands.   Attention: normal. Concentration: normal.   Speech: speech is normal   Level of consciousness: alert  Knowledge: consistent with education.   Able to name object. Able to read. Able to repeat. Able to write. Normal comprehension.      Cranial Nerves      CN II   Visual acuity: normal  Right visual field deficit: none  Left visual field deficit: none      CN III, IV, VI   Pupils are equal, round, and reactive to light.  Right pupil: Size: 3 mm. Shape: regular. Reactivity: brisk. Consensual response: intact.   Left pupil: Size: 3 mm. Shape: regular. Reactivity: brisk. Consensual response: intact.   CN III: no CN III palsy  CN VI: no CN VI palsy  Nystagmus: none   Diplopia: none  Ophthalmoparesis: " none  Conjugate gaze: present     CN V   Right facial sensation deficit: none  Left facial sensation deficit: none     CN VII   Right facial weakness: none  Left facial weakness: none     CN VIII   Hearing: intact     CN IX, X   CN IX normal.   CN X normal.      CN XI   Right sternocleidomastoid strength: normal  Left sternocleidomastoid strength: normal  Right trapezius strength: normal  Left trapezius strength: normal     CN XII   Fasciculations: absent  Tongue deviation: none     Motor Exam   Muscle bulk: normal  Overall muscle tone: normal  Right arm pronator drift: absent  Left arm pronator drift: absent     Strength   Right deltoid: 5/5  Left deltoid: 5/5  Right biceps: 5/5  Left biceps: 5/5  Right triceps: 5/5  Left triceps: 5/5  Right wrist flexion: 5/5  Left wrist flexion: 5/5  Right wrist extension: 5/5  Left wrist extension: 5/5  Right interossei: 5/5  Left interossei: 5/5  Right iliopsoas: 5/5  Left iliopsoas: 5/5  Right quadriceps: 5/5  Left quadriceps: 5/5  Right hamstrin/5  Right anterior tibial: 5/5  Left anterior tibial: 5/5  Right posterior tibial: 5/5  Left posterior tibial: 5/5  Right peroneal: 5/5  Left peroneal: 5/5  Right gastroc: 5/5  Left gastroc: 5/5     Sensory Exam   Right arm light touch: normal  Left arm light touch: normal  Right leg light touch: normal  Left leg light touch: normal     Gait, Coordination, and Reflexes      Gait  Gait: normal      Coordination   Romberg: negative  Finger to nose coordination: normal  Heel to shin coordination: normal  Tandem walking coordination: normal     Tremor   Resting tremor: absent  Intention tremor: absent  Action tremor: absent     Reflexes   Right brachioradialis: 3+  Left brachioradialis: 3+  Right biceps: 3+  Left biceps: 3+  Right triceps: 3+  Left triceps: 3+  Right patellar: 3+  Left patellar: 3+  Right achilles: 2+  Left achilles: 2+  Right Brown: present  Left Brown: present  Right ankle clonus: absent  Left ankle clonus:  absent  Right plantar: normal  Left plantar: normal       Physical Exam  Constitutional: Oriented to person, place, and time. Appears well-developed and well-nourished.   HENT:   Head: Normocephalic and atraumatic.   Eyes: Pupils are equal, round, and reactive to light.   Neck: Normal range of motion. Neck supple.   Cardiovascular: Normal rate.    Pulmonary/Chest: Effort normal.   Musculoskeletal: Normal range of motion. Exhibits no edema.   Neurological: Alert and oriented to person, place, and time. Normal Finger-Nose-Finger Test, a normal Heel to Shin Test, a normal Romberg Test and a normal Tandem Gait Test. Gait normal.   Skin: Skin is warm, dry and intact.   Psychiatric: Normal mood and affect. Speech is normal and behavior is normal. Judgment and thought content normal.   Nursing note and vitals reviewed.       Provider dictation:    The patient is a 32 year old female who presents for neurosurgical evaluation. She reports chronic neck pain and pain mainly in the left trapezius for the past 3 years. She states it feels like a constant charley horse. She has tried physical therapy with dry needling in 2021 for about 6 months with short term relief. She denies any prior ESIs. She also reports some tingling which is intermittent in the left trapezius which improves with elevating her left arm above her head. Denies any upper extremity weakness or dropping objects from her hands. Denies difficulty with fine motor movements or gait instability.     On exam the patient has full strength and no sensory deficits. Diffusely hyperreflexic and + bilateral jamil sign present.     I will obtain an updated MRI of the Cervical spine to rule out acute compressive pathology. I will call the patient with the results and further plan.     1. Cervical spondylosis  MRI Cervical Spine Without Contrast

## 2023-01-24 ENCOUNTER — HOSPITAL ENCOUNTER (OUTPATIENT)
Dept: RADIOLOGY | Facility: HOSPITAL | Age: 33
Discharge: HOME | End: 2023-01-24
Attending: PHYSICIAN ASSISTANT
Payer: COMMERCIAL

## 2023-01-24 DIAGNOSIS — M47.812 CERVICAL SPONDYLOSIS: ICD-10-CM

## 2023-01-24 PROCEDURE — 72141 MRI CERVICAL SPINE WITHOUT CONTRAST: ICD-10-PCS | Mod: 26,,, | Performed by: RADIOLOGY

## 2023-01-24 PROCEDURE — 72141 MRI NECK SPINE W/O DYE: CPT | Mod: 26,,, | Performed by: RADIOLOGY

## 2023-01-24 PROCEDURE — 72141 MRI NECK SPINE W/O DYE: CPT | Mod: TC,PO

## 2023-03-28 ENCOUNTER — OFFICE VISIT (OUTPATIENT)
Dept: RHEUMATOLOGY | Facility: CLINIC | Age: 33
End: 2023-03-28
Payer: COMMERCIAL

## 2023-03-28 VITALS
BODY MASS INDEX: 19.6 KG/M2 | HEIGHT: 62 IN | SYSTOLIC BLOOD PRESSURE: 111 MMHG | HEART RATE: 98 BPM | DIASTOLIC BLOOD PRESSURE: 75 MMHG | WEIGHT: 106.5 LBS

## 2023-03-28 DIAGNOSIS — M35.00 SJOGREN'S SYNDROME, WITH UNSPECIFIED ORGAN INVOLVEMENT: ICD-10-CM

## 2023-03-28 DIAGNOSIS — Z15.89 HETEROZYGOUS MTHFR MUTATION C677T: ICD-10-CM

## 2023-03-28 DIAGNOSIS — M79.7 FIBROMYALGIA: ICD-10-CM

## 2023-03-28 DIAGNOSIS — K90.41 NCGS (NON-CELIAC GLUTEN SENSITIVITY): ICD-10-CM

## 2023-03-28 DIAGNOSIS — R76.8 ANA POSITIVE: Primary | ICD-10-CM

## 2023-03-28 PROCEDURE — 3008F PR BODY MASS INDEX (BMI) DOCUMENTED: ICD-10-PCS | Mod: CPTII,S$GLB,, | Performed by: INTERNAL MEDICINE

## 2023-03-28 PROCEDURE — 3008F BODY MASS INDEX DOCD: CPT | Mod: CPTII,S$GLB,, | Performed by: INTERNAL MEDICINE

## 2023-03-28 PROCEDURE — 99999 PR PBB SHADOW E&M-EST. PATIENT-LVL III: ICD-10-PCS | Mod: PBBFAC,,, | Performed by: INTERNAL MEDICINE

## 2023-03-28 PROCEDURE — 1160F RVW MEDS BY RX/DR IN RCRD: CPT | Mod: CPTII,S$GLB,, | Performed by: INTERNAL MEDICINE

## 2023-03-28 PROCEDURE — 3074F SYST BP LT 130 MM HG: CPT | Mod: CPTII,S$GLB,, | Performed by: INTERNAL MEDICINE

## 2023-03-28 PROCEDURE — 3078F DIAST BP <80 MM HG: CPT | Mod: CPTII,S$GLB,, | Performed by: INTERNAL MEDICINE

## 2023-03-28 PROCEDURE — 1159F PR MEDICATION LIST DOCUMENTED IN MEDICAL RECORD: ICD-10-PCS | Mod: CPTII,S$GLB,, | Performed by: INTERNAL MEDICINE

## 2023-03-28 PROCEDURE — 99999 PR PBB SHADOW E&M-EST. PATIENT-LVL III: CPT | Mod: PBBFAC,,, | Performed by: INTERNAL MEDICINE

## 2023-03-28 PROCEDURE — 1160F PR REVIEW ALL MEDS BY PRESCRIBER/CLIN PHARMACIST DOCUMENTED: ICD-10-PCS | Mod: CPTII,S$GLB,, | Performed by: INTERNAL MEDICINE

## 2023-03-28 PROCEDURE — 99214 PR OFFICE/OUTPT VISIT, EST, LEVL IV, 30-39 MIN: ICD-10-PCS | Mod: S$GLB,,, | Performed by: INTERNAL MEDICINE

## 2023-03-28 PROCEDURE — 3078F PR MOST RECENT DIASTOLIC BLOOD PRESSURE < 80 MM HG: ICD-10-PCS | Mod: CPTII,S$GLB,, | Performed by: INTERNAL MEDICINE

## 2023-03-28 PROCEDURE — 1159F MED LIST DOCD IN RCRD: CPT | Mod: CPTII,S$GLB,, | Performed by: INTERNAL MEDICINE

## 2023-03-28 PROCEDURE — 99214 OFFICE O/P EST MOD 30 MIN: CPT | Mod: S$GLB,,, | Performed by: INTERNAL MEDICINE

## 2023-03-28 PROCEDURE — 3074F PR MOST RECENT SYSTOLIC BLOOD PRESSURE < 130 MM HG: ICD-10-PCS | Mod: CPTII,S$GLB,, | Performed by: INTERNAL MEDICINE

## 2023-03-28 RX ORDER — HYDROXYCHLOROQUINE SULFATE 200 MG/1
200 TABLET, FILM COATED ORAL DAILY
Qty: 30 TABLET | Refills: 11 | Status: SHIPPED | OUTPATIENT
Start: 2023-03-28 | End: 2023-12-19

## 2023-03-28 NOTE — PROGRESS NOTES
Subjective:       Patient ID: Cheryl Peter is a 32 y.o. female.    Chief Complaint: Disease Management      Follow up: 32 year old female who presents to clinic for follow up on MERRY positivity and gluten sensitivity. . She cut out gluten in mid October and within 1 month noticed a significant improvement in her arthralgias, rash, and fatigue. She has eaten more gluten this week and she does feel her sx have returned. She primarily has pain in her neck, shoulders, thoracic spine, lumbar spine, and hips. Denies peripheral joint pain.           Labs reviewed--MERRY 1:80 speckled. Planning to try to conceive in 2023.    Review of Systems   Constitutional:  Positive for activity change. Negative for unexpected weight change.   HENT:  Negative for mouth sores.    Eyes:  Negative for redness.   Respiratory:  Positive for cough. Negative for shortness of breath.    Cardiovascular:  Negative for chest pain.   Gastrointestinal:  Negative for constipation and diarrhea.   Genitourinary:  Negative for genital sores.   Musculoskeletal:  Positive for arthralgias, back pain and neck pain.   Skin:  Negative for rash.   Neurological:  Positive for numbness.   Hematological:  Does not bruise/bleed easily.       Objective:     Vitals:    23 1613   BP: 111/75   Pulse: 98       Past Medical History:   Diagnosis Date    Fibromyalgia      Past Surgical History:   Procedure Laterality Date    ADENOIDECTOMY      ankle arthroscopy       SECTION      DILATION AND CURETTAGE OF UTERUS      TONSILLECTOMY            Physical Exam   Eyes: Right conjunctiva is not injected. Left conjunctiva is not injected.   Neck: No JVD present. No thyromegaly present.   Cardiovascular: Normal rate and regular rhythm. Exam reveals no decreased pulses.   Pulmonary/Chest: Effort normal.   Musculoskeletal:      Right shoulder: Normal.      Left shoulder: Normal.      Right elbow: Normal.      Left elbow: Normal.      Right wrist: Normal.       Left wrist: Normal.      Right knee: Normal.      Left knee: Normal.   Lymphadenopathy:     She has no cervical adenopathy.   Neurological: Gait normal.   Skin: No purpura and no rash noted.   Psychiatric: Mood and affect normal.       Right Side Rheumatological Exam     Examination finds the shoulder, elbow, wrist, knee, 1st PIP, 1st MCP, 2nd PIP, 2nd MCP, 3rd PIP, 3rd MCP, 4th PIP, 4th MCP, 5th PIP and 5th MCP normal.    Left Side Rheumatological Exam     Examination finds the shoulder, elbow, wrist, knee, 1st PIP, 1st MCP, 2nd PIP, 2nd MCP, 3rd PIP, 3rd MCP, 4th PIP, 4th MCP, 5th PIP and 5th MCP normal.      Back/Neck Exam   Tenderness Right paramedian tenderness of the Upper T-Spine, Lower C-Spine and Upper C-Spine.Left paramedian tenderness of the Upper T-Spine, Lower C-Spine and Upper C-Spine.      Labs:  Component      Latest Ref Rng & Units 12/7/2022   Progesterone      See Text ng/mL 5.0   LH      See Text mIU/mL 2.2   FSH      See Text mIU/mL 4.92   MERRY      Negative <1:80 Positive (A)   MERRY PATTERN 1       Speckled   MERRY Titer 1       1:80     Assessment:       1. MERRY positive    2. NCGS (non-celiac gluten sensitivity)    3. Fibromyalgia    4. Sjogren's syndrome, with unspecified organ involvement    5. Heterozygous MTHFR mutation C677T              Plan:       MERRY positive  -     TESTOSTERONE; Future; Expected date: 03/28/2023  -     DHEA; Future; Expected date: 03/28/2023  -     Sjogrens syndrome-A extractable nuclear antibody; Future; Expected date: 03/28/2023  -     Sjogrens syndrome-B extractable nuclear antibody; Future; Expected date: 03/28/2023  -     hydrOXYchloroQUINE (PLAQUENIL) 200 mg tablet; Take 1 tablet (200 mg total) by mouth once daily. for 365 doses  Dispense: 30 tablet; Refill: 11    NCGS (non-celiac gluten sensitivity)  -     TESTOSTERONE; Future; Expected date: 03/28/2023  -     DHEA; Future; Expected date: 03/28/2023  -     Sjogrens syndrome-A extractable nuclear antibody; Future;  Expected date: 03/28/2023  -     Sjogrens syndrome-B extractable nuclear antibody; Future; Expected date: 03/28/2023  -     hydrOXYchloroQUINE (PLAQUENIL) 200 mg tablet; Take 1 tablet (200 mg total) by mouth once daily. for 365 doses  Dispense: 30 tablet; Refill: 11    Fibromyalgia  -     TESTOSTERONE; Future; Expected date: 03/28/2023  -     DHEA; Future; Expected date: 03/28/2023  -     Sjogrens syndrome-A extractable nuclear antibody; Future; Expected date: 03/28/2023  -     Sjogrens syndrome-B extractable nuclear antibody; Future; Expected date: 03/28/2023  -     hydrOXYchloroQUINE (PLAQUENIL) 200 mg tablet; Take 1 tablet (200 mg total) by mouth once daily. for 365 doses  Dispense: 30 tablet; Refill: 11    Sjogren's syndrome, with unspecified organ involvement  -     TESTOSTERONE; Future; Expected date: 03/28/2023  -     DHEA; Future; Expected date: 03/28/2023  -     Sjogrens syndrome-A extractable nuclear antibody; Future; Expected date: 03/28/2023  -     Sjogrens syndrome-B extractable nuclear antibody; Future; Expected date: 03/28/2023  -     hydrOXYchloroQUINE (PLAQUENIL) 200 mg tablet; Take 1 tablet (200 mg total) by mouth once daily. for 365 doses  Dispense: 30 tablet; Refill: 11    Heterozygous MTHFR mutation C677T  -     TESTOSTERONE; Future; Expected date: 03/28/2023  -     DHEA; Future; Expected date: 03/28/2023  -     Sjogrens syndrome-A extractable nuclear antibody; Future; Expected date: 03/28/2023  -     Sjogrens syndrome-B extractable nuclear antibody; Future; Expected date: 03/28/2023  -     hydrOXYchloroQUINE (PLAQUENIL) 200 mg tablet; Take 1 tablet (200 mg total) by mouth once daily. for 365 doses  Dispense: 30 tablet; Refill: 11            Assessment:  32 year old female with   MERRY 1:80 speckled, prior diagnosis of fibromyalgia  --insomnia  --non-celiac gluten sensitivity  --MTHFR mutation    Plan:  Cont gluten free diet  Add plaquenil daily  Consider osphena  More than 50% of the  30 minute  encounter was spent face to face counseling the patient regarding current status and future plan of care as well as side effects  of the medications. All questions were answered to patient's satisfaction also includes  non-face to face time preparing to see the patient (eg, review of tests), Obtaining and/or reviewing separately obtained history, Documenting clinical information in the electronic or other health record, Independently interpreting results

## 2023-03-29 ENCOUNTER — LAB VISIT (OUTPATIENT)
Dept: LAB | Facility: HOSPITAL | Age: 33
End: 2023-03-29
Attending: INTERNAL MEDICINE
Payer: COMMERCIAL

## 2023-03-29 DIAGNOSIS — R76.8 ANA POSITIVE: ICD-10-CM

## 2023-03-29 DIAGNOSIS — Z15.89 HETEROZYGOUS MTHFR MUTATION C677T: ICD-10-CM

## 2023-03-29 DIAGNOSIS — M79.7 FIBROMYALGIA: ICD-10-CM

## 2023-03-29 DIAGNOSIS — K90.41 NCGS (NON-CELIAC GLUTEN SENSITIVITY): ICD-10-CM

## 2023-03-29 DIAGNOSIS — M35.00 SJOGREN'S SYNDROME, WITH UNSPECIFIED ORGAN INVOLVEMENT: ICD-10-CM

## 2023-03-29 DIAGNOSIS — E61.1 IRON DEFICIENCY: ICD-10-CM

## 2023-03-29 LAB
ALBUMIN SERPL BCP-MCNC: 4.6 G/DL (ref 3.5–5.2)
ALP SERPL-CCNC: 43 U/L (ref 55–135)
ALT SERPL W/O P-5'-P-CCNC: 11 U/L (ref 10–44)
ANION GAP SERPL CALC-SCNC: 7 MMOL/L (ref 8–16)
AST SERPL-CCNC: 17 U/L (ref 10–40)
BASOPHILS # BLD AUTO: 0.04 K/UL (ref 0–0.2)
BASOPHILS NFR BLD: 0.6 % (ref 0–1.9)
BILIRUB SERPL-MCNC: 0.2 MG/DL (ref 0.1–1)
BUN SERPL-MCNC: 11 MG/DL (ref 6–20)
CALCIUM SERPL-MCNC: 10.1 MG/DL (ref 8.7–10.5)
CHLORIDE SERPL-SCNC: 103 MMOL/L (ref 95–110)
CO2 SERPL-SCNC: 28 MMOL/L (ref 23–29)
CREAT SERPL-MCNC: 0.8 MG/DL (ref 0.5–1.4)
DIFFERENTIAL METHOD: NORMAL
EOSINOPHIL # BLD AUTO: 0 K/UL (ref 0–0.5)
EOSINOPHIL NFR BLD: 0.4 % (ref 0–8)
ERYTHROCYTE [DISTWIDTH] IN BLOOD BY AUTOMATED COUNT: 12 % (ref 11.5–14.5)
EST. GFR  (NO RACE VARIABLE): >60 ML/MIN/1.73 M^2
GLUCOSE SERPL-MCNC: 76 MG/DL (ref 70–110)
HCT VFR BLD AUTO: 44.4 % (ref 37–48.5)
HGB BLD-MCNC: 14.3 G/DL (ref 12–16)
IMM GRANULOCYTES # BLD AUTO: 0.03 K/UL (ref 0–0.04)
IMM GRANULOCYTES NFR BLD AUTO: 0.4 % (ref 0–0.5)
LYMPHOCYTES # BLD AUTO: 2.3 K/UL (ref 1–4.8)
LYMPHOCYTES NFR BLD: 32.4 % (ref 18–48)
MCH RBC QN AUTO: 30.7 PG (ref 27–31)
MCHC RBC AUTO-ENTMCNC: 32.2 G/DL (ref 32–36)
MCV RBC AUTO: 95 FL (ref 82–98)
MONOCYTES # BLD AUTO: 0.3 K/UL (ref 0.3–1)
MONOCYTES NFR BLD: 4.8 % (ref 4–15)
NEUTROPHILS # BLD AUTO: 4.4 K/UL (ref 1.8–7.7)
NEUTROPHILS NFR BLD: 61.4 % (ref 38–73)
NRBC BLD-RTO: 0 /100 WBC
PLATELET # BLD AUTO: 379 K/UL (ref 150–450)
PMV BLD AUTO: 9.5 FL (ref 9.2–12.9)
POTASSIUM SERPL-SCNC: 4.5 MMOL/L (ref 3.5–5.1)
PROT SERPL-MCNC: 8 G/DL (ref 6–8.4)
RBC # BLD AUTO: 4.66 M/UL (ref 4–5.4)
SODIUM SERPL-SCNC: 138 MMOL/L (ref 136–145)
WBC # BLD AUTO: 7.09 K/UL (ref 3.9–12.7)

## 2023-03-29 PROCEDURE — 80053 COMPREHEN METABOLIC PANEL: CPT | Performed by: INTERNAL MEDICINE

## 2023-03-29 PROCEDURE — 82626 DEHYDROEPIANDROSTERONE: CPT | Performed by: INTERNAL MEDICINE

## 2023-03-29 PROCEDURE — 86235 NUCLEAR ANTIGEN ANTIBODY: CPT | Performed by: INTERNAL MEDICINE

## 2023-03-29 PROCEDURE — 86235 NUCLEAR ANTIGEN ANTIBODY: CPT | Mod: 59 | Performed by: INTERNAL MEDICINE

## 2023-03-29 PROCEDURE — 85025 COMPLETE CBC W/AUTO DIFF WBC: CPT | Performed by: INTERNAL MEDICINE

## 2023-03-29 PROCEDURE — 84403 ASSAY OF TOTAL TESTOSTERONE: CPT | Performed by: INTERNAL MEDICINE

## 2023-03-30 LAB
ANTI-SSA ANTIBODY: 0.16 RATIO (ref 0–0.99)
ANTI-SSA INTERPRETATION: NEGATIVE
ANTI-SSB ANTIBODY: 0.05 RATIO (ref 0–0.99)
ANTI-SSB INTERPRETATION: NEGATIVE
TESTOST SERPL-MCNC: 20 NG/DL (ref 5–73)

## 2023-04-04 LAB — DHEA SERPL-MCNC: 1.97 NG/ML (ref 1.33–7.78)

## 2023-04-11 ENCOUNTER — PATIENT MESSAGE (OUTPATIENT)
Dept: ADMINISTRATIVE | Facility: HOSPITAL | Age: 33
End: 2023-04-11
Payer: COMMERCIAL

## 2023-05-10 ENCOUNTER — PATIENT MESSAGE (OUTPATIENT)
Dept: RHEUMATOLOGY | Facility: CLINIC | Age: 33
End: 2023-05-10
Payer: COMMERCIAL

## 2023-07-28 ENCOUNTER — LAB VISIT (OUTPATIENT)
Dept: LAB | Facility: HOSPITAL | Age: 33
End: 2023-07-28
Attending: PHYSICIAN ASSISTANT
Payer: COMMERCIAL

## 2023-07-28 ENCOUNTER — OFFICE VISIT (OUTPATIENT)
Dept: RHEUMATOLOGY | Facility: CLINIC | Age: 33
End: 2023-07-28
Payer: COMMERCIAL

## 2023-07-28 VITALS
HEIGHT: 62 IN | DIASTOLIC BLOOD PRESSURE: 68 MMHG | BODY MASS INDEX: 19.32 KG/M2 | SYSTOLIC BLOOD PRESSURE: 116 MMHG | WEIGHT: 105 LBS | HEART RATE: 76 BPM

## 2023-07-28 DIAGNOSIS — K90.41 NCGS (NON-CELIAC GLUTEN SENSITIVITY): ICD-10-CM

## 2023-07-28 DIAGNOSIS — E61.1 IRON DEFICIENCY: ICD-10-CM

## 2023-07-28 DIAGNOSIS — Z79.899 HIGH RISK MEDICATION USE: ICD-10-CM

## 2023-07-28 DIAGNOSIS — M35.00 SJOGREN'S SYNDROME, WITH UNSPECIFIED ORGAN INVOLVEMENT: Primary | ICD-10-CM

## 2023-07-28 DIAGNOSIS — M35.00 SJOGREN'S SYNDROME, WITH UNSPECIFIED ORGAN INVOLVEMENT: ICD-10-CM

## 2023-07-28 LAB
ALBUMIN SERPL BCP-MCNC: 4.3 G/DL (ref 3.5–5.2)
ALP SERPL-CCNC: 33 U/L (ref 55–135)
ALT SERPL W/O P-5'-P-CCNC: 10 U/L (ref 10–44)
ANION GAP SERPL CALC-SCNC: 11 MMOL/L (ref 8–16)
AST SERPL-CCNC: 17 U/L (ref 10–40)
BASOPHILS # BLD AUTO: 0.02 K/UL (ref 0–0.2)
BASOPHILS NFR BLD: 0.5 % (ref 0–1.9)
BILIRUB SERPL-MCNC: 0.3 MG/DL (ref 0.1–1)
BUN SERPL-MCNC: 12 MG/DL (ref 6–20)
CALCIUM SERPL-MCNC: 9.6 MG/DL (ref 8.7–10.5)
CHLORIDE SERPL-SCNC: 107 MMOL/L (ref 95–110)
CO2 SERPL-SCNC: 21 MMOL/L (ref 23–29)
CREAT SERPL-MCNC: 0.7 MG/DL (ref 0.5–1.4)
DIFFERENTIAL METHOD: ABNORMAL
EOSINOPHIL # BLD AUTO: 0 K/UL (ref 0–0.5)
EOSINOPHIL NFR BLD: 0.5 % (ref 0–8)
ERYTHROCYTE [DISTWIDTH] IN BLOOD BY AUTOMATED COUNT: 12.3 % (ref 11.5–14.5)
EST. GFR  (NO RACE VARIABLE): >60 ML/MIN/1.73 M^2
FERRITIN SERPL-MCNC: 58 NG/ML (ref 20–300)
GLUCOSE SERPL-MCNC: 76 MG/DL (ref 70–110)
HCT VFR BLD AUTO: 39 % (ref 37–48.5)
HGB BLD-MCNC: 13.2 G/DL (ref 12–16)
IMM GRANULOCYTES # BLD AUTO: 0.01 K/UL (ref 0–0.04)
IMM GRANULOCYTES NFR BLD AUTO: 0.3 % (ref 0–0.5)
IRON SERPL-MCNC: 74 UG/DL (ref 30–160)
LYMPHOCYTES # BLD AUTO: 1.5 K/UL (ref 1–4.8)
LYMPHOCYTES NFR BLD: 38.6 % (ref 18–48)
MCH RBC QN AUTO: 31.1 PG (ref 27–31)
MCHC RBC AUTO-ENTMCNC: 33.8 G/DL (ref 32–36)
MCV RBC AUTO: 92 FL (ref 82–98)
MONOCYTES # BLD AUTO: 0.4 K/UL (ref 0.3–1)
MONOCYTES NFR BLD: 10 % (ref 4–15)
NEUTROPHILS # BLD AUTO: 2 K/UL (ref 1.8–7.7)
NEUTROPHILS NFR BLD: 50.1 % (ref 38–73)
NRBC BLD-RTO: 0 /100 WBC
PLATELET # BLD AUTO: 215 K/UL (ref 150–450)
PMV BLD AUTO: 10 FL (ref 9.2–12.9)
POTASSIUM SERPL-SCNC: 4.1 MMOL/L (ref 3.5–5.1)
PROT SERPL-MCNC: 7.2 G/DL (ref 6–8.4)
RBC # BLD AUTO: 4.24 M/UL (ref 4–5.4)
SATURATED IRON: 20 % (ref 20–50)
SODIUM SERPL-SCNC: 139 MMOL/L (ref 136–145)
TOTAL IRON BINDING CAPACITY: 364 UG/DL (ref 250–450)
TRANSFERRIN SERPL-MCNC: 246 MG/DL (ref 200–375)
WBC # BLD AUTO: 3.91 K/UL (ref 3.9–12.7)

## 2023-07-28 PROCEDURE — 3008F PR BODY MASS INDEX (BMI) DOCUMENTED: ICD-10-PCS | Mod: CPTII,S$GLB,, | Performed by: PHYSICIAN ASSISTANT

## 2023-07-28 PROCEDURE — 3074F SYST BP LT 130 MM HG: CPT | Mod: CPTII,S$GLB,, | Performed by: PHYSICIAN ASSISTANT

## 2023-07-28 PROCEDURE — 99999 PR PBB SHADOW E&M-EST. PATIENT-LVL III: ICD-10-PCS | Mod: PBBFAC,,, | Performed by: PHYSICIAN ASSISTANT

## 2023-07-28 PROCEDURE — 99213 PR OFFICE/OUTPT VISIT, EST, LEVL III, 20-29 MIN: ICD-10-PCS | Mod: S$GLB,,, | Performed by: PHYSICIAN ASSISTANT

## 2023-07-28 PROCEDURE — 85025 COMPLETE CBC W/AUTO DIFF WBC: CPT | Performed by: PHYSICIAN ASSISTANT

## 2023-07-28 PROCEDURE — 1160F RVW MEDS BY RX/DR IN RCRD: CPT | Mod: CPTII,S$GLB,, | Performed by: PHYSICIAN ASSISTANT

## 2023-07-28 PROCEDURE — 3074F PR MOST RECENT SYSTOLIC BLOOD PRESSURE < 130 MM HG: ICD-10-PCS | Mod: CPTII,S$GLB,, | Performed by: PHYSICIAN ASSISTANT

## 2023-07-28 PROCEDURE — 80053 COMPREHEN METABOLIC PANEL: CPT | Performed by: PHYSICIAN ASSISTANT

## 2023-07-28 PROCEDURE — 1159F MED LIST DOCD IN RCRD: CPT | Mod: CPTII,S$GLB,, | Performed by: PHYSICIAN ASSISTANT

## 2023-07-28 PROCEDURE — 1160F PR REVIEW ALL MEDS BY PRESCRIBER/CLIN PHARMACIST DOCUMENTED: ICD-10-PCS | Mod: CPTII,S$GLB,, | Performed by: PHYSICIAN ASSISTANT

## 2023-07-28 PROCEDURE — 3008F BODY MASS INDEX DOCD: CPT | Mod: CPTII,S$GLB,, | Performed by: PHYSICIAN ASSISTANT

## 2023-07-28 PROCEDURE — 3078F PR MOST RECENT DIASTOLIC BLOOD PRESSURE < 80 MM HG: ICD-10-PCS | Mod: CPTII,S$GLB,, | Performed by: PHYSICIAN ASSISTANT

## 2023-07-28 PROCEDURE — 99999 PR PBB SHADOW E&M-EST. PATIENT-LVL III: CPT | Mod: PBBFAC,,, | Performed by: PHYSICIAN ASSISTANT

## 2023-07-28 PROCEDURE — 82728 ASSAY OF FERRITIN: CPT | Performed by: PHYSICIAN ASSISTANT

## 2023-07-28 PROCEDURE — 1159F PR MEDICATION LIST DOCUMENTED IN MEDICAL RECORD: ICD-10-PCS | Mod: CPTII,S$GLB,, | Performed by: PHYSICIAN ASSISTANT

## 2023-07-28 PROCEDURE — 3078F DIAST BP <80 MM HG: CPT | Mod: CPTII,S$GLB,, | Performed by: PHYSICIAN ASSISTANT

## 2023-07-28 PROCEDURE — 84466 ASSAY OF TRANSFERRIN: CPT | Performed by: PHYSICIAN ASSISTANT

## 2023-07-28 PROCEDURE — 36415 COLL VENOUS BLD VENIPUNCTURE: CPT | Mod: PO | Performed by: PHYSICIAN ASSISTANT

## 2023-07-28 PROCEDURE — 99213 OFFICE O/P EST LOW 20 MIN: CPT | Mod: S$GLB,,, | Performed by: PHYSICIAN ASSISTANT

## 2023-07-28 NOTE — PROGRESS NOTES
Subjective:       Patient ID: Cheryl Peter is a 33 y.o. female.    Chief Complaint: Disease Management      Mrs. Peter is a 33 year old female who presents to clinic for follow up on MERRY positivity and gluten sensitivity. She has noticed an improvement in her joint pain since starting plaquenil at her last visit. She reports taking the med approx 3-4 days per week because she forgets to take it.  She primarily has pain in her neck, shoulders, thoracic spine, lumbar spine, and hips. Denies peripheral joint pain. She complains of more significant fatigue recently and is concerned about low iron. She was previously followed by Hematology and received iron infusions in 2021. She has heavy menstrual cycles. She started medical cannibis which has helped significant with her pain overall.     She has intermittent numbness/burning that involves medial border scapula on the L side, which is unchanged since 2020. Eval w/pain management with MRI was unrevealing. PT x 6 months with no resolution of sx. If she moves a certain way pain/numbness will dissipate. She is scheduled for Ortho eval Dr. Parnell next month.         Review of Systems   Constitutional:  Negative for fever and unexpected weight change.   HENT:  Negative for mouth sores and trouble swallowing.    Eyes:  Negative for redness.   Respiratory:  Positive for cough. Negative for shortness of breath.    Cardiovascular:  Negative for chest pain.   Gastrointestinal:  Negative for constipation and diarrhea.   Genitourinary:  Negative for dysuria and genital sores.   Musculoskeletal:  Positive for arthralgias, back pain, myalgias and neck pain.   Skin:  Negative for rash.   Neurological:  Positive for numbness. Negative for headaches.   Hematological:  Does not bruise/bleed easily.       Objective:     Vitals:    07/28/23 1123   BP: 116/68   Pulse: 76       Past Medical History:   Diagnosis Date    Fibromyalgia      Past Surgical History:   Procedure  Laterality Date    ADENOIDECTOMY      ankle arthroscopy       SECTION      DILATION AND CURETTAGE OF UTERUS      TONSILLECTOMY            Physical Exam   Eyes: Right conjunctiva is not injected. Left conjunctiva is not injected.   Neck: No JVD present. No thyromegaly present.   Cardiovascular: Normal rate and regular rhythm. Exam reveals no decreased pulses.   Pulmonary/Chest: Effort normal.   Musculoskeletal:      Right shoulder: Normal.      Left shoulder: Normal.      Right elbow: Normal.      Left elbow: Normal.      Right wrist: Normal.      Left wrist: Normal.      Right knee: Normal.      Left knee: Normal.   Lymphadenopathy:     She has no cervical adenopathy.   Neurological: Gait normal.   Skin: No purpura and no rash noted.   Psychiatric: Mood and affect normal.       Right Side Rheumatological Exam     Examination finds the shoulder, elbow, wrist, knee, 1st PIP, 1st MCP, 2nd PIP, 2nd MCP, 3rd PIP, 3rd MCP, 4th PIP, 4th MCP, 5th PIP and 5th MCP normal.    Left Side Rheumatological Exam     Examination finds the shoulder, elbow, wrist, knee, 1st PIP, 1st MCP, 2nd PIP, 2nd MCP, 3rd PIP, 3rd MCP, 4th PIP, 4th MCP, 5th PIP and 5th MCP normal.      Back/Neck Exam   Tenderness Right paramedian tenderness of the Upper T-Spine, Lower C-Spine and Upper C-Spine.Left paramedian tenderness of the Upper T-Spine, Lower C-Spine and Upper C-Spine.      Labs:  Component      Latest Ref Rng & Units 3/29/2023   WBC      3.90 - 12.70 K/uL 7.09   RBC      4.00 - 5.40 M/uL 4.66   Hemoglobin      12.0 - 16.0 g/dL 14.3   Hematocrit      37.0 - 48.5 % 44.4   MCV      82 - 98 fL 95   MCH      27.0 - 31.0 pg 30.7   MCHC      32.0 - 36.0 g/dL 32.2   RDW      11.5 - 14.5 % 12.0   Platelets      150 - 450 K/uL 379   MPV      9.2 - 12.9 fL 9.5   Immature Granulocytes      0.0 - 0.5 % 0.4   Gran # (ANC)      1.8 - 7.7 K/uL 4.4   Immature Grans (Abs)      0.00 - 0.04 K/uL 0.03   Lymph #      1.0 - 4.8 K/uL 2.3   Mono #      0.3 -  1.0 K/uL 0.3   Eos #      0.0 - 0.5 K/uL 0.0   Baso #      0.00 - 0.20 K/uL 0.04   nRBC      0 /100 WBC 0   Gran %      38.0 - 73.0 % 61.4   Lymph %      18.0 - 48.0 % 32.4   Mono %      4.0 - 15.0 % 4.8   Eosinophil %      0.0 - 8.0 % 0.4   Basophil %      0.0 - 1.9 % 0.6   Differential Method       Automated   Sodium      136 - 145 mmol/L 138   Potassium      3.5 - 5.1 mmol/L 4.5   Chloride      95 - 110 mmol/L 103   CO2      23 - 29 mmol/L 28   Glucose      70 - 110 mg/dL 76   BUN      6 - 20 mg/dL 11   Creatinine      0.5 - 1.4 mg/dL 0.8   Calcium      8.7 - 10.5 mg/dL 10.1   PROTEIN TOTAL      6.0 - 8.4 g/dL 8.0   Albumin      3.5 - 5.2 g/dL 4.6   BILIRUBIN TOTAL      0.1 - 1.0 mg/dL 0.2   Alkaline Phosphatase      55 - 135 U/L 43 (L)   AST      10 - 40 U/L 17   ALT      10 - 44 U/L 11   Anion Gap      8 - 16 mmol/L 7 (L)   eGFR      >60 mL/min/1.73 m:2 >60.0   Anti-SSA Antibody      0.00 - 0.99 Ratio 0.16   Anti-SSA Interpretation      Negative Negative   Anti-SSB Antibody      0.00 - 0.99 Ratio 0.05   Anti-SSB Interpretation      Negative Negative   Testosterone, Total      5 - 73 ng/dL 20   DHEA      1.330 - 7.780 ng/mL 1.974       Assessment:       1. Sjogren's syndrome, with unspecified organ involvement    2. NCGS (non-celiac gluten sensitivity)    3. Iron deficiency    4. High risk medication use              Plan:       Sjogren's syndrome, with unspecified organ involvement  -     Cancel: CBC W/ AUTO DIFFERENTIAL; Future; Expected date: 07/28/2023  -     Cancel: COMPREHENSIVE METABOLIC PANEL; Future; Expected date: 07/28/2023  -     CBC Auto Differential; Future; Expected date: 07/28/2023  -     Comprehensive Metabolic Panel; Future; Expected date: 07/28/2023    NCGS (non-celiac gluten sensitivity)  -     Cancel: CBC W/ AUTO DIFFERENTIAL; Future; Expected date: 07/28/2023  -     Cancel: COMPREHENSIVE METABOLIC PANEL; Future; Expected date: 07/28/2023  -     CBC Auto Differential; Future; Expected date:  07/28/2023  -     Comprehensive Metabolic Panel; Future; Expected date: 07/28/2023    Iron deficiency  -     Iron and TIBC; Future; Expected date: 07/28/2023  -     Ferritin; Future; Expected date: 07/28/2023  -     CBC Auto Differential; Future; Expected date: 07/28/2023  -     Comprehensive Metabolic Panel; Future; Expected date: 07/28/2023    High risk medication use            Assessment:  33 year old female with   Sjogren's syndrome, MERRY 1:80 speckled, prior diagnosis of fibromyalgia  --insomnia  --non-celiac gluten sensitivity  --MTHFR mutation    Plan:  Cont gluten free diet  Check labs today   Cont plaquenil 200 mg daily    Follow up:  4 mo Dr. Hampton, will order labs needed at her visit

## 2023-09-17 ENCOUNTER — PATIENT MESSAGE (OUTPATIENT)
Dept: OTOLARYNGOLOGY | Facility: CLINIC | Age: 33
End: 2023-09-17
Payer: COMMERCIAL

## 2023-09-18 RX ORDER — LEVOCETIRIZINE DIHYDROCHLORIDE 5 MG/1
5 TABLET, FILM COATED ORAL NIGHTLY
Qty: 30 TABLET | Refills: 0 | Status: SHIPPED | OUTPATIENT
Start: 2023-09-18 | End: 2023-12-19

## 2023-09-18 RX ORDER — BENZONATATE 100 MG/1
100 CAPSULE ORAL 3 TIMES DAILY PRN
Qty: 30 CAPSULE | Refills: 1 | Status: SHIPPED | OUTPATIENT
Start: 2023-09-18 | End: 2023-09-28

## 2023-09-18 RX ORDER — FAMOTIDINE 20 MG/1
20 TABLET, FILM COATED ORAL 2 TIMES DAILY
Qty: 60 TABLET | Refills: 0 | Status: SHIPPED | OUTPATIENT
Start: 2023-09-18 | End: 2023-12-19

## 2023-09-18 RX ORDER — PREDNISONE 20 MG/1
20 TABLET ORAL DAILY
Qty: 5 TABLET | Refills: 0 | Status: SHIPPED | OUTPATIENT
Start: 2023-09-18 | End: 2023-09-23

## 2023-09-28 ENCOUNTER — PATIENT MESSAGE (OUTPATIENT)
Dept: RHEUMATOLOGY | Facility: CLINIC | Age: 33
End: 2023-09-28
Payer: COMMERCIAL

## 2023-10-26 ENCOUNTER — PATIENT MESSAGE (OUTPATIENT)
Dept: FAMILY MEDICINE | Facility: CLINIC | Age: 33
End: 2023-10-26
Payer: COMMERCIAL

## 2023-11-13 ENCOUNTER — PATIENT MESSAGE (OUTPATIENT)
Dept: RHEUMATOLOGY | Facility: CLINIC | Age: 33
End: 2023-11-13
Payer: COMMERCIAL

## 2023-11-15 ENCOUNTER — PATIENT MESSAGE (OUTPATIENT)
Dept: RHEUMATOLOGY | Facility: CLINIC | Age: 33
End: 2023-11-15
Payer: COMMERCIAL

## 2023-11-15 ENCOUNTER — E-VISIT (OUTPATIENT)
Dept: FAMILY MEDICINE | Facility: CLINIC | Age: 33
End: 2023-11-15
Payer: COMMERCIAL

## 2023-11-15 DIAGNOSIS — J32.9 SINUSITIS, UNSPECIFIED CHRONICITY, UNSPECIFIED LOCATION: Primary | ICD-10-CM

## 2023-11-15 PROCEDURE — 99421 PR E&M, ONLINE DIGIT, EST, < 7 DAYS, 5-10 MINS: ICD-10-PCS | Mod: ,,, | Performed by: STUDENT IN AN ORGANIZED HEALTH CARE EDUCATION/TRAINING PROGRAM

## 2023-11-15 PROCEDURE — 99421 OL DIG E/M SVC 5-10 MIN: CPT | Mod: ,,, | Performed by: STUDENT IN AN ORGANIZED HEALTH CARE EDUCATION/TRAINING PROGRAM

## 2023-11-15 RX ORDER — BENZOCAINE .13; .15; .5; 2 G/100G; G/100G; G/100G; G/100G
1 GEL ORAL DAILY
Qty: 8.43 ML | Refills: 1 | Status: SHIPPED | OUTPATIENT
Start: 2023-11-15 | End: 2023-12-19

## 2023-11-15 RX ORDER — CETIRIZINE HYDROCHLORIDE 10 MG/1
10 TABLET ORAL DAILY PRN
Qty: 30 TABLET | Refills: 0 | Status: SHIPPED | OUTPATIENT
Start: 2023-11-15 | End: 2023-12-19

## 2023-11-15 NOTE — PROGRESS NOTES
Patient ID: Cheryl Peter is a 33 y.o. female.    Chief Complaint: URI (Entered automatically based on patient selection in Patient Portal.)          274}  The patient initiated a request through [employer] on 11/15/2023 for evaluation and management with a chief complaint of URI (Entered automatically based on patient selection in Patient Portal.)     I evaluated the questionnaire submission on 11/15/2023 .    [facility] Peq Evisit Upper Respitatory/Cough Questionnaire    11/15/2023 12:28 PM CST - Filed by Patient   Do you agree to participate in an E-Visit? Yes   If you have any of the following symptoms, please present to your local ER or call 911:  I acknowledge   What is the main issue that you would like for your doctor to address today? Severe nasal congestion   Are you able to take your vital signs? Yes   Systolic Blood Pressure:    Diastolic Blood Pressure:    Weight: 108   Height: 62   Pulse:    Temperature: 97.3   Respiration rate:    Pulse Oxygen:    Are you currently pregnant, could you be pregnant, or are you breast feeding? Pregnant   What symptoms do you currently have?  Cough;  Fatigue;  Nasal Congestion;  Runny nose;  Pain around the nose and face   Describe your cough: Dry   Have you ever smoked? I have never smoked   Have you had a fever? No   When did your symptoms first appear? 11/12/2023   In the last two weeks, have you been in close contact with someone who has COVID-19 or the Flu? No   In the last two weeks, have you worked or volunteered in a healthcare facility or as a ? Healthcare facilities include a hospital, medical or dental clinic, long-term care facility, or nursing home No   Do you live in a long-term care facility, nursing home, group home, or homeless shelter? No   List what you have done or taken to help your symptoms. Humidifier for sleep and sleeping on an incline   How severe are your symptoms? Severe   Have your symptoms improved since they first appeared? Worse    Have you taken an at home Covid test? No   Have you taken a Flu test? No   Have you been fully vaccinated for COVID? (2 Pfizer, 2 Moderna or 1 Geoff & Geoff vaccine injections) Yes   Have you received a booster? Yes   Have you recieved a Flu shot? No   Do you have transportation to get tested for COVID if it is indicated and ordered for you at an [facility] location? Yes   Provide any information you feel is important to your history not asked above I really just would like to know what I am allowed to take to help alleviate the symptoms since I'm pregnant. My OB is closed on Wednesday and my congestion is getting worse to the point that it's hard to swallow because it's so stopped up.   Please attach any relevant images or files           Active Problem List with Overview Notes    Diagnosis Date Noted    Fibromyalgia 08/05/2021    Iron deficiency 05/26/2021    Sinus tachycardia 05/03/2018      Recent Labs Obtained:  No visits with results within 7 Day(s) from this visit.   Latest known visit with results is:   Orders Only on 10/27/2023   Component Date Value Ref Range Status    H. pylori Stool Ag, EIA 10/30/2023 Negative  Negative Final    Fats, Neutral 10/30/2023 Normal   Final                                   Normal (<60 Droplets/HPF)    Fats, Total 10/30/2023 Normal   Final                                  Normal (<100 Droplets/HPF)    Stool WBC 10/30/2023 Final report (A)  None Seen Final    Leukocytes 10/30/2023 Comment (A)   Final    Moderate amount of white blood cells.    Rotavirus 10/30/2023 Negative  Negative Final    Calprotectin, Fecal 10/30/2023 188 (H)  0 - 120 ug/g Final    Comment: Concentration     Interpretation   Follow-Up  < 5 - 50 ug/g     Normal           None  >50 -120 ug/g     Borderline       Re-evaluate in 4-6 weeks      >120 ug/g     Abnormal         Repeat as clinically                                     indicated      Giardia Antigen - EIA 10/30/2023 Negative  Negative Final     Cryptosporidium Antigen 10/30/2023 Negative  Negative Final    Ova + Parasite Exam 10/30/2023 Final report   Final    Comment: These results were obtained using wet preparation(s) and trichrome  stained smear. This test does not include testing for Cryptosporidium  parvum, Cyclospora, or Microsporidia.      Ova/Parasites Identified 10/30/2023 Comment   Final    Comment: No ova, cysts, or parasites seen.  One negative specimen does not rule out the possibility of a  parasitic infection.      Clostridium difficile toxin genes 10/30/2023 Negative  Negative Final    Salmonella/Shigella Screen 10/30/2023 Final report   Final    Bacteria 10/30/2023 Comment   Final    No Salmonella or Shigella recovered.    Campylobacter Culture 10/30/2023 Final report   Final    Bacteria 10/30/2023 Comment   Final    No Campylobacter species isolated.    E coli, Shiga toxin Assay 10/30/2023 Negative  Negative Final       Encounter Diagnosis   Name Primary?    Sinusitis, unspecified chronicity, unspecified location Yes        No orders of the defined types were placed in this encounter.     Medications Ordered This Encounter   Medications    budesonide (RINOCORT AQUA) 32 mcg/actuation nasal spray     Si spray (32 mcg total) by Nasal route once daily.     Dispense:  8.43 mL     Refill:  1    cetirizine (ZYRTEC) 10 MG tablet     Sig: Take 1 tablet (10 mg total) by mouth daily as needed for Allergies or Rhinitis.     Dispense:  30 tablet     Refill:  0        E-Visit Time Tracking:    Day 1 Time (in minutes): 6     Total Time (in minutes): 6       274}

## 2023-11-21 ENCOUNTER — PATIENT MESSAGE (OUTPATIENT)
Dept: ADMINISTRATIVE | Facility: OTHER | Age: 33
End: 2023-11-21
Payer: COMMERCIAL

## 2024-03-12 ENCOUNTER — OFFICE VISIT (OUTPATIENT)
Dept: RHEUMATOLOGY | Facility: CLINIC | Age: 34
End: 2024-03-12
Payer: COMMERCIAL

## 2024-03-12 ENCOUNTER — LAB VISIT (OUTPATIENT)
Dept: LAB | Facility: HOSPITAL | Age: 34
End: 2024-03-12
Attending: INTERNAL MEDICINE
Payer: COMMERCIAL

## 2024-03-12 VITALS
DIASTOLIC BLOOD PRESSURE: 58 MMHG | WEIGHT: 130.5 LBS | HEIGHT: 62 IN | SYSTOLIC BLOOD PRESSURE: 112 MMHG | HEART RATE: 96 BPM | BODY MASS INDEX: 24.01 KG/M2

## 2024-03-12 DIAGNOSIS — K90.41 NCGS (NON-CELIAC GLUTEN SENSITIVITY): ICD-10-CM

## 2024-03-12 DIAGNOSIS — Z15.89 HETEROZYGOUS MTHFR MUTATION C677T: ICD-10-CM

## 2024-03-12 DIAGNOSIS — E61.1 IRON DEFICIENCY: Primary | ICD-10-CM

## 2024-03-12 DIAGNOSIS — B37.9 YEAST INFECTION: ICD-10-CM

## 2024-03-12 DIAGNOSIS — M35.00 SJOGREN'S SYNDROME, WITH UNSPECIFIED ORGAN INVOLVEMENT: ICD-10-CM

## 2024-03-12 DIAGNOSIS — E61.1 IRON DEFICIENCY: ICD-10-CM

## 2024-03-12 DIAGNOSIS — Z34.90: ICD-10-CM

## 2024-03-12 LAB
ALBUMIN SERPL BCP-MCNC: 3.1 G/DL (ref 3.5–5.2)
ALP SERPL-CCNC: 66 U/L (ref 55–135)
ALT SERPL W/O P-5'-P-CCNC: 10 U/L (ref 10–44)
ANION GAP SERPL CALC-SCNC: 3 MMOL/L (ref 8–16)
AST SERPL-CCNC: 16 U/L (ref 10–40)
BASOPHILS # BLD AUTO: 0.05 K/UL (ref 0–0.2)
BASOPHILS NFR BLD: 0.4 % (ref 0–1.9)
BILIRUB SERPL-MCNC: 0.3 MG/DL (ref 0.1–1)
BUN SERPL-MCNC: 6 MG/DL (ref 6–20)
CALCIUM SERPL-MCNC: 9.3 MG/DL (ref 8.7–10.5)
CHLORIDE SERPL-SCNC: 107 MMOL/L (ref 95–110)
CO2 SERPL-SCNC: 24 MMOL/L (ref 23–29)
CREAT SERPL-MCNC: 0.6 MG/DL (ref 0.5–1.4)
DIFFERENTIAL METHOD BLD: ABNORMAL
EOSINOPHIL # BLD AUTO: 0.1 K/UL (ref 0–0.5)
EOSINOPHIL NFR BLD: 0.7 % (ref 0–8)
ERYTHROCYTE [DISTWIDTH] IN BLOOD BY AUTOMATED COUNT: 13.1 % (ref 11.5–14.5)
ERYTHROCYTE [SEDIMENTATION RATE] IN BLOOD BY PHOTOMETRIC METHOD: 38 MM/HR (ref 0–36)
EST. GFR  (NO RACE VARIABLE): >60 ML/MIN/1.73 M^2
GLUCOSE SERPL-MCNC: 86 MG/DL (ref 70–110)
HCT VFR BLD AUTO: 33.9 % (ref 37–48.5)
HGB BLD-MCNC: 11.3 G/DL (ref 12–16)
IMM GRANULOCYTES # BLD AUTO: 0.14 K/UL (ref 0–0.04)
IMM GRANULOCYTES NFR BLD AUTO: 1.2 % (ref 0–0.5)
IRON SERPL-MCNC: 166 UG/DL (ref 30–160)
LYMPHOCYTES # BLD AUTO: 2.2 K/UL (ref 1–4.8)
LYMPHOCYTES NFR BLD: 18.2 % (ref 18–48)
MCH RBC QN AUTO: 32.2 PG (ref 27–31)
MCHC RBC AUTO-ENTMCNC: 33.3 G/DL (ref 32–36)
MCV RBC AUTO: 97 FL (ref 82–98)
MONOCYTES # BLD AUTO: 0.5 K/UL (ref 0.3–1)
MONOCYTES NFR BLD: 4.5 % (ref 4–15)
NEUTROPHILS # BLD AUTO: 8.9 K/UL (ref 1.8–7.7)
NEUTROPHILS NFR BLD: 75 % (ref 38–73)
NRBC BLD-RTO: 0 /100 WBC
PLATELET # BLD AUTO: 291 K/UL (ref 150–450)
PMV BLD AUTO: 9.7 FL (ref 9.2–12.9)
POTASSIUM SERPL-SCNC: 4.2 MMOL/L (ref 3.5–5.1)
PROT SERPL-MCNC: 6.3 G/DL (ref 6–8.4)
RBC # BLD AUTO: 3.51 M/UL (ref 4–5.4)
SATURATED IRON: 29 % (ref 20–50)
SODIUM SERPL-SCNC: 134 MMOL/L (ref 136–145)
TOTAL IRON BINDING CAPACITY: 576 UG/DL (ref 250–450)
TRANSFERRIN SERPL-MCNC: 389 MG/DL (ref 200–375)
TRANSFERRIN SERPL-MCNC: 389 MG/DL (ref 200–375)
WBC # BLD AUTO: 11.81 K/UL (ref 3.9–12.7)

## 2024-03-12 PROCEDURE — 86235 NUCLEAR ANTIGEN ANTIBODY: CPT | Mod: 59 | Performed by: INTERNAL MEDICINE

## 2024-03-12 PROCEDURE — 80053 COMPREHEN METABOLIC PANEL: CPT | Performed by: INTERNAL MEDICINE

## 2024-03-12 PROCEDURE — 99999 PR PBB SHADOW E&M-EST. PATIENT-LVL III: CPT | Mod: PBBFAC,,, | Performed by: INTERNAL MEDICINE

## 2024-03-12 PROCEDURE — 83540 ASSAY OF IRON: CPT | Performed by: INTERNAL MEDICINE

## 2024-03-12 PROCEDURE — 1160F RVW MEDS BY RX/DR IN RCRD: CPT | Mod: CPTII,S$GLB,, | Performed by: INTERNAL MEDICINE

## 2024-03-12 PROCEDURE — 1159F MED LIST DOCD IN RCRD: CPT | Mod: CPTII,S$GLB,, | Performed by: INTERNAL MEDICINE

## 2024-03-12 PROCEDURE — 86038 ANTINUCLEAR ANTIBODIES: CPT | Performed by: INTERNAL MEDICINE

## 2024-03-12 PROCEDURE — 3078F DIAST BP <80 MM HG: CPT | Mod: CPTII,S$GLB,, | Performed by: INTERNAL MEDICINE

## 2024-03-12 PROCEDURE — 3008F BODY MASS INDEX DOCD: CPT | Mod: CPTII,S$GLB,, | Performed by: INTERNAL MEDICINE

## 2024-03-12 PROCEDURE — 86235 NUCLEAR ANTIGEN ANTIBODY: CPT | Performed by: INTERNAL MEDICINE

## 2024-03-12 PROCEDURE — 85652 RBC SED RATE AUTOMATED: CPT | Performed by: INTERNAL MEDICINE

## 2024-03-12 PROCEDURE — 86225 DNA ANTIBODY NATIVE: CPT | Performed by: INTERNAL MEDICINE

## 2024-03-12 PROCEDURE — 85025 COMPLETE CBC W/AUTO DIFF WBC: CPT | Performed by: INTERNAL MEDICINE

## 2024-03-12 PROCEDURE — 83516 IMMUNOASSAY NONANTIBODY: CPT | Performed by: INTERNAL MEDICINE

## 2024-03-12 PROCEDURE — 3074F SYST BP LT 130 MM HG: CPT | Mod: CPTII,S$GLB,, | Performed by: INTERNAL MEDICINE

## 2024-03-12 PROCEDURE — 99214 OFFICE O/P EST MOD 30 MIN: CPT | Mod: S$GLB,,, | Performed by: INTERNAL MEDICINE

## 2024-03-12 RX ORDER — NYSTATIN 100000 U/G
CREAM TOPICAL 2 TIMES DAILY
Qty: 30 G | Refills: 2 | Status: SHIPPED | OUTPATIENT
Start: 2024-03-12 | End: 2024-04-10

## 2024-03-12 RX ORDER — FOLIC ACID/MULTIVIT,IRON,MINER 0.4MG-18MG
1 TABLET ORAL
COMMUNITY

## 2024-03-12 RX ORDER — FERROUS SULFATE 324(65)MG
324 TABLET, DELAYED RELEASE (ENTERIC COATED) ORAL
COMMUNITY

## 2024-03-12 NOTE — PROGRESS NOTES
Subjective:     Patient ID:  Cheryl Peter    Chief Complaint:  Disease Management     History of Present Illness:  Mrs. Peter is a 33 year old female who presents to clinic for follow up on MERRY positivity and gluten sensitivity. She has noticed an improvement in her joint pain since starting plaquenil at her last visit. She reports taking the med approx 3-4 days per week because she forgets to take it.  She primarily has pain in her neck, shoulders, thoracic spine, lumbar spine, and hips. Denies peripheral joint pain. She complains of more significant fatigue recently and is concerned about low iron.. She has intermittent numbness/burning that involves medial border scapula on the L side, which is unchanged since . Eval w/pain management with MRI was unrevealing. PT x 6 months with no resolution of sx. If she moves a certain way pain/numbness will dissipate.    - Diagnosis/es:  - Positive serologies:  - Infectious screening labs:  - Previous Treatments:  - Current Treatments:     Interval History:   Hospitalization since last office visit: No    Patient Active Problem List    Diagnosis Date Noted    Fibromyalgia 2021    Iron deficiency 2021    Sinus tachycardia 2018     Past Surgical History:   Procedure Laterality Date    ADENOIDECTOMY      ankle arthroscopy       SECTION      DILATION AND CURETTAGE OF UTERUS      TONSILLECTOMY       Social History     Tobacco Use    Smoking status: Never    Smokeless tobacco: Never   Substance Use Topics    Alcohol use: Yes     Comment: socially    Drug use: Never     Family History   Problem Relation Age of Onset    Diabetes Mother     Hyperlipidemia Mother     Miscarriages / Stillbirths Mother     Heart disease Father     Hyperlipidemia Father     Diabetes Father     Depression Father     Cancer Maternal Grandmother     Arthritis Maternal Grandmother     Miscarriages / Stillbirths Maternal Grandmother     Cancer Maternal Grandfather      Glaucoma Neg Hx     Macular degeneration Neg Hx      Review of patient's allergies indicates:   Allergen Reactions    Flexeril [cyclobenzaprine] Palpitations    Penicillins Hives and Rash       Review of Systems   Review of Systems   Constitutional:  Negative for activity change, appetite change, chills, diaphoresis, fatigue, fever and unexpected weight change.   HENT:  Negative for congestion, dental problem, ear discharge, ear pain, facial swelling, mouth sores, nosebleeds, postnasal drip, rhinorrhea, sinus pressure, sneezing, sore throat, tinnitus, trouble swallowing and voice change.    Eyes:  Negative for photophobia, pain, discharge, redness and itching.   Respiratory:  Negative for apnea, cough, chest tightness, shortness of breath and wheezing.    Cardiovascular:  Positive for leg swelling. Negative for chest pain and palpitations.   Gastrointestinal:  Negative for abdominal distention, abdominal pain, constipation, diarrhea, nausea and vomiting.   Endocrine: Negative for cold intolerance, heat intolerance, polydipsia and polyuria.   Genitourinary:  Negative for decreased urine volume, difficulty urinating, dysuria, flank pain, frequency, genital sores, hematuria and urgency.   Musculoskeletal:  Positive for arthralgias, back pain, gait problem, joint swelling, myalgias, neck pain and neck stiffness.   Skin:  Negative for pallor, rash and wound.   Allergic/Immunologic: Negative for immunocompromised state.   Neurological:  Negative for dizziness, tremors, weakness, numbness and headaches.   Hematological:  Negative for adenopathy. Does not bruise/bleed easily.   Psychiatric/Behavioral:  Negative for sleep disturbance. The patient is not nervous/anxious.         Current Medications:  Current Outpatient Medications   Medication Instructions    aspirin 81 mg, Oral    diphenhydrAMINE (BENADRYL) 25 mg, Oral, Every 6 hours PRN    doxylamine succinate 25 mg, Oral    ferrous sulfate 324 mg, Oral    nystatin  "(MYCOSTATIN) cream Topical (Top), 2 times daily    PNV cmb#95-ferrous fumarate-FA (PRENATAL) 28 mg iron- 800 mcg Tab 1 tablet    progesterone (PROMETRIUM) 200 MG capsule PLACE 1 CAPSULE VAGINALLY 2 DAYS AFTER POSITIVE OPK         Objective:     Vitals:    03/12/24 1259   BP: (!) 112/58   Pulse: 96   Weight: 59.2 kg (130 lb 8.2 oz)   Height: 5' 2.01" (1.575 m)   PainSc:   4      Body mass index is 23.86 kg/m².     Physical Examinations:  Physical Exam     Disease Assessment Scores:  Patient's Global Assessment of arthritis (0-10): 2  Physician's Global Assessment of arthritis (0-10): 2  Number of Tender Joints (0-28): 2  Number of Swollen Joints (0-28): 2    There is currently no information documented on the homunculus. Go to the Rheumatology activity and complete the homunculus joint exam.         3/9/2024     8:14 AM   Rapid3 Question Responses and Scores   MDHAQ Score 0.5   Psychologic Score 4.4   Pain Score 5   When you awakened in the morning OVER THE LAST WEEK, did you feel stiff? Yes   If Yes, please indicate the number of hours until you are as limber as you will be for the day 4   Fatigue Score 4   Global Health Score 5   RAPID3 Score 3.89       Monitoring Lab Results:  Lab Results   Component Value Date    WBC 10.48 03/11/2024    RBC 3.59 (L) 03/11/2024    HGB 11.7 (L) 03/11/2024    HCT 34.6 (L) 03/11/2024    MCV 96 03/11/2024    MCH 32.6 (H) 03/11/2024    MCHC 33.8 03/11/2024    RDW 13.1 03/11/2024     03/11/2024        Lab Results   Component Value Date     07/28/2023    K 4.1 07/28/2023     07/28/2023    CO2 21 (L) 07/28/2023    GLU 76 07/28/2023    BUN 12 07/28/2023    CREATININE 0.7 07/28/2023    CALCIUM 9.6 07/28/2023    PROT 7.2 07/28/2023    ALBUMIN 4.3 07/28/2023    BILITOT 0.3 07/28/2023    ALKPHOS 33 (L) 07/28/2023    AST 17 07/28/2023    ALT 10 07/28/2023    ANIONGAP 11 07/28/2023    EGFRNORACEVR >60.0 07/28/2023       Lab Results   Component Value Date    SEDRATE <2 " "06/30/2022    CRP 1.0 06/30/2022        Lab Results   Component Value Date    WJBSXQNY67QQ 24 (L) 05/19/2021    ZHBSGIPU58 373 04/07/2021        Lab Results   Component Value Date    CHOL 162 04/07/2021    HDL 61 04/07/2021    LDLCALC 83.6 04/07/2021    TRIG 87 04/07/2021       Lab Results   Component Value Date    RF <13.0 06/30/2022    CCPANTIBODIE <0.5 06/30/2022     Lab Results   Component Value Date    ANASCREEN Negative <1:80 06/30/2022    ANATITER 1:80 12/07/2022    DSDNA Negative 1:10 06/30/2022     Lab Results   Component Value Date    HLABB27 Negative 06/30/2022       Infectious Disease Screening:  Lab Results   Component Value Date    HEPBSAG Non-reactive 11/21/2023     No results found for: "HEPCAB", "HEPCVAB", "HCVQUANTRES"  No results found for: "TBGOLDPLUS", "QUANTTBGDPL"  No results found for: "QUANTIFERON", "SVCMT", "QUANTAGVALUE", "QUANTNILVALU", "QUANTMITOGEN", "QFTTBAG", "QINT"     Imaging:  DEXA, Xrays, MRIs, CTs, etc    Old & Outside Medical Records:  Reviewed old and all outside medical records available in Care Everywhere    Current Medication Changes:  Medication List with Changes/Refills   New Medications    NYSTATIN (MYCOSTATIN) CREAM    Apply topically 2 (two) times daily.   Current Medications    ASPIRIN 81 MG CAP    Take 81 mg by mouth.    DIPHENHYDRAMINE (BENADRYL) 25 MG CAPSULE    Take 25 mg by mouth every 6 (six) hours as needed for Itching.    DOXYLAMINE SUCCINATE 25 MG TABLET    Take 25 mg by mouth.    FERROUS SULFATE 324 MG (65 MG IRON) TBEC    Take 324 mg by mouth.    PNV CMB#95-FERROUS FUMARATE-FA (PRENATAL) 28 MG IRON- 800 MCG TAB    1 tablet.    PROGESTERONE (PROMETRIUM) 200 MG CAPSULE    PLACE 1 CAPSULE VAGINALLY 2 DAYS AFTER POSITIVE OPK   Discontinued Medications    PROGESTERONE (PROMETRIUM) 200 MG CAPSULE    Place 1 capsule (200 mg total) vaginally once daily.        Assessment:     Encounter Diagnoses   Name Primary?    Iron deficiency Yes    Sjogren's syndrome, with " unspecified organ involvement     NCGS (non-celiac gluten sensitivity)     Heterozygous MTHFR mutation C677T     Gravid uterus at 28 to 32 weeks size     Yeast infection        Plan:      Encounter Diagnoses   Name Primary?    Iron deficiency Yes    Sjogren's syndrome, with unspecified organ involvement     NCGS (non-celiac gluten sensitivity)     Heterozygous MTHFR mutation C677T     Gravid uterus at 28 to 32 weeks size     Yeast infection      Cheryl was seen today for disease management.    Diagnoses and all orders for this visit:    Iron deficiency  -     Transferrin; Future  -     Iron and TIBC; Future  -     Sedimentation rate; Future  -     Sjogrens syndrome-B extractable nuclear antibody; Future  -     Sjogrens syndrome-A extractable nuclear antibody; Future  -     Comprehensive Metabolic Panel; Future  -     CBC Auto Differential; Future  -     Anti-Smith Antibody; Future  -     Anti-Scleroderma Antibody; Future  -     Anti-Histone Antibody; Future  -     Anti-DNA Ab, Double-Stranded; Future  -     Anti Sm/RNP Antibody; Future  -     MERRY Screen w/Reflex; Future    Sjogren's syndrome, with unspecified organ involvement  -     Sedimentation rate; Future  -     Sjogrens syndrome-B extractable nuclear antibody; Future  -     Sjogrens syndrome-A extractable nuclear antibody; Future  -     Comprehensive Metabolic Panel; Future  -     CBC Auto Differential; Future  -     Anti-Smith Antibody; Future  -     Anti-Scleroderma Antibody; Future  -     Anti-Histone Antibody; Future  -     Anti-DNA Ab, Double-Stranded; Future  -     Anti Sm/RNP Antibody; Future  -     MERRY Screen w/Reflex; Future    NCGS (non-celiac gluten sensitivity)  -     Sedimentation rate; Future  -     Sjogrens syndrome-B extractable nuclear antibody; Future  -     Sjogrens syndrome-A extractable nuclear antibody; Future  -     Comprehensive Metabolic Panel; Future  -     CBC Auto Differential; Future  -     Anti-Smith Antibody; Future  -      Anti-Scleroderma Antibody; Future  -     Anti-Histone Antibody; Future  -     Anti-DNA Ab, Double-Stranded; Future  -     Anti Sm/RNP Antibody; Future  -     MERRY Screen w/Reflex; Future    Heterozygous MTHFR mutation C677T  -     Sedimentation rate; Future  -     Sjogrens syndrome-B extractable nuclear antibody; Future  -     Sjogrens syndrome-A extractable nuclear antibody; Future  -     Comprehensive Metabolic Panel; Future  -     CBC Auto Differential; Future  -     Anti-Smith Antibody; Future  -     Anti-Scleroderma Antibody; Future  -     Anti-Histone Antibody; Future  -     Anti-DNA Ab, Double-Stranded; Future  -     Anti Sm/RNP Antibody; Future  -     MERRY Screen w/Reflex; Future    Gravid uterus at 28 to 32 weeks size  -     Sedimentation rate; Future  -     Sjogrens syndrome-B extractable nuclear antibody; Future  -     Sjogrens syndrome-A extractable nuclear antibody; Future  -     Comprehensive Metabolic Panel; Future  -     CBC Auto Differential; Future  -     Anti-Smith Antibody; Future  -     Anti-Scleroderma Antibody; Future  -     Anti-Histone Antibody; Future  -     Anti-DNA Ab, Double-Stranded; Future  -     Anti Sm/RNP Antibody; Future  -     MERRY Screen w/Reflex; Future    Yeast infection  -     nystatin (MYCOSTATIN) cream; Apply topically 2 (two) times daily.  -     Sedimentation rate; Future  -     Sjogrens syndrome-B extractable nuclear antibody; Future  -     Sjogrens syndrome-A extractable nuclear antibody; Future  -     Comprehensive Metabolic Panel; Future  -     CBC Auto Differential; Future  -     Anti-Smith Antibody; Future  -     Anti-Scleroderma Antibody; Future  -     Anti-Histone Antibody; Future  -     Anti-DNA Ab, Double-Stranded; Future  -     Anti Sm/RNP Antibody; Future  -     MERRY Screen w/Reflex; Future      No additional meds at this time  F/u  4 month     More than 50% of the  35 minute encounter was spent face to face counseling the patient regarding current status and future  plan of care as well as side effects  of the medications. All questions were answered to patient's satisfaction also includes  non-face to face time preparing to see the patient (eg, review of tests), Obtaining and/or reviewing separately obtained history, Documenting clinical information in the electronic or other health record, Independently interpreting results

## 2024-03-13 ENCOUNTER — PATIENT MESSAGE (OUTPATIENT)
Dept: RHEUMATOLOGY | Facility: CLINIC | Age: 34
End: 2024-03-13
Payer: COMMERCIAL

## 2024-03-13 LAB
ANA SER QL IF: NORMAL
DSDNA AB SER-ACNC: NORMAL [IU]/ML

## 2024-03-14 LAB
ANTI SM ANTIBODY: 0.08 RATIO (ref 0–0.99)
ANTI SM/RNP ANTIBODY: 0.06 RATIO (ref 0–0.99)
ANTI-SM INTERPRETATION: NEGATIVE
ANTI-SM/RNP INTERPRETATION: NEGATIVE
ANTI-SSA ANTIBODY: 0.07 RATIO (ref 0–0.99)
ANTI-SSA INTERPRETATION: NEGATIVE
ANTI-SSB ANTIBODY: 0.06 RATIO (ref 0–0.99)
ANTI-SSB INTERPRETATION: NEGATIVE

## 2024-03-15 LAB — ENA SCL70 AB SER-ACNC: <0.6 U/ML

## 2024-03-16 LAB — HISTONE IGG SER IA-ACNC: 0.4 UNITS (ref 0–0.9)

## 2024-06-04 ENCOUNTER — LAB VISIT (OUTPATIENT)
Dept: LAB | Facility: HOSPITAL | Age: 34
End: 2024-06-04
Attending: NURSE PRACTITIONER
Payer: COMMERCIAL

## 2024-06-04 ENCOUNTER — PATIENT MESSAGE (OUTPATIENT)
Dept: RHEUMATOLOGY | Facility: CLINIC | Age: 34
End: 2024-06-04
Payer: COMMERCIAL

## 2024-06-04 DIAGNOSIS — D50.8 OTHER IRON DEFICIENCY ANEMIA: ICD-10-CM

## 2024-06-04 LAB
ALBUMIN SERPL BCP-MCNC: 3.9 G/DL (ref 3.5–5.2)
ALP SERPL-CCNC: 56 U/L (ref 55–135)
ALT SERPL W/O P-5'-P-CCNC: 14 U/L (ref 10–44)
ANION GAP SERPL CALC-SCNC: 9 MMOL/L (ref 8–16)
AST SERPL-CCNC: 18 U/L (ref 10–40)
BASOPHILS # BLD AUTO: 0.04 K/UL (ref 0–0.2)
BASOPHILS NFR BLD: 0.8 % (ref 0–1.9)
BILIRUB SERPL-MCNC: 0.3 MG/DL (ref 0.1–1)
BUN SERPL-MCNC: 7 MG/DL (ref 6–20)
CALCIUM SERPL-MCNC: 10 MG/DL (ref 8.7–10.5)
CHLORIDE SERPL-SCNC: 105 MMOL/L (ref 95–110)
CO2 SERPL-SCNC: 24 MMOL/L (ref 23–29)
CREAT SERPL-MCNC: 0.8 MG/DL (ref 0.5–1.4)
DIFFERENTIAL METHOD BLD: ABNORMAL
EOSINOPHIL # BLD AUTO: 0.2 K/UL (ref 0–0.5)
EOSINOPHIL NFR BLD: 4.4 % (ref 0–8)
ERYTHROCYTE [DISTWIDTH] IN BLOOD BY AUTOMATED COUNT: 12.2 % (ref 11.5–14.5)
EST. GFR  (NO RACE VARIABLE): >60 ML/MIN/1.73 M^2
FERRITIN SERPL-MCNC: 88 NG/ML (ref 20–300)
GLUCOSE SERPL-MCNC: 85 MG/DL (ref 70–110)
HCT VFR BLD AUTO: 42.3 % (ref 37–48.5)
HGB BLD-MCNC: 13.7 G/DL (ref 12–16)
IMM GRANULOCYTES # BLD AUTO: 0.02 K/UL (ref 0–0.04)
IMM GRANULOCYTES NFR BLD AUTO: 0.4 % (ref 0–0.5)
IRON SERPL-MCNC: 61 UG/DL (ref 30–160)
LYMPHOCYTES # BLD AUTO: 1.8 K/UL (ref 1–4.8)
LYMPHOCYTES NFR BLD: 35.2 % (ref 18–48)
MCH RBC QN AUTO: 32.8 PG (ref 27–31)
MCHC RBC AUTO-ENTMCNC: 32.4 G/DL (ref 32–36)
MCV RBC AUTO: 101 FL (ref 82–98)
MONOCYTES # BLD AUTO: 0.4 K/UL (ref 0.3–1)
MONOCYTES NFR BLD: 7.5 % (ref 4–15)
NEUTROPHILS # BLD AUTO: 2.7 K/UL (ref 1.8–7.7)
NEUTROPHILS NFR BLD: 51.7 % (ref 38–73)
NRBC BLD-RTO: 0 /100 WBC
PLATELET # BLD AUTO: 284 K/UL (ref 150–450)
PMV BLD AUTO: 9 FL (ref 9.2–12.9)
POTASSIUM SERPL-SCNC: 4.4 MMOL/L (ref 3.5–5.1)
PROT SERPL-MCNC: 7 G/DL (ref 6–8.4)
RBC # BLD AUTO: 4.18 M/UL (ref 4–5.4)
SATURATED IRON: 15 % (ref 20–50)
SODIUM SERPL-SCNC: 138 MMOL/L (ref 136–145)
TOTAL IRON BINDING CAPACITY: 401 UG/DL (ref 250–450)
TRANSFERRIN SERPL-MCNC: 271 MG/DL (ref 200–375)
WBC # BLD AUTO: 5.2 K/UL (ref 3.9–12.7)

## 2024-06-04 PROCEDURE — 85025 COMPLETE CBC W/AUTO DIFF WBC: CPT | Performed by: NURSE PRACTITIONER

## 2024-06-04 PROCEDURE — 36415 COLL VENOUS BLD VENIPUNCTURE: CPT | Mod: PO | Performed by: NURSE PRACTITIONER

## 2024-06-04 PROCEDURE — 80053 COMPREHEN METABOLIC PANEL: CPT | Performed by: NURSE PRACTITIONER

## 2024-06-04 PROCEDURE — 82728 ASSAY OF FERRITIN: CPT | Performed by: NURSE PRACTITIONER

## 2024-06-04 PROCEDURE — 83540 ASSAY OF IRON: CPT | Performed by: NURSE PRACTITIONER

## 2024-06-24 ENCOUNTER — OFFICE VISIT (OUTPATIENT)
Dept: PODIATRY | Facility: CLINIC | Age: 34
End: 2024-06-24
Payer: COMMERCIAL

## 2024-06-24 VITALS — HEIGHT: 62 IN | BODY MASS INDEX: 23 KG/M2 | WEIGHT: 125 LBS

## 2024-06-24 DIAGNOSIS — L60.3 ONYCHODYSTROPHY: Primary | ICD-10-CM

## 2024-06-24 PROCEDURE — 1160F RVW MEDS BY RX/DR IN RCRD: CPT | Mod: CPTII,S$GLB,, | Performed by: PODIATRIST

## 2024-06-24 PROCEDURE — 99999 PR PBB SHADOW E&M-EST. PATIENT-LVL III: CPT | Mod: PBBFAC,,, | Performed by: PODIATRIST

## 2024-06-24 PROCEDURE — 99203 OFFICE O/P NEW LOW 30 MIN: CPT | Mod: S$GLB,,, | Performed by: PODIATRIST

## 2024-06-24 PROCEDURE — 3008F BODY MASS INDEX DOCD: CPT | Mod: CPTII,S$GLB,, | Performed by: PODIATRIST

## 2024-06-24 PROCEDURE — 1159F MED LIST DOCD IN RCRD: CPT | Mod: CPTII,S$GLB,, | Performed by: PODIATRIST

## 2024-06-24 PROCEDURE — 87101 SKIN FUNGI CULTURE: CPT | Performed by: PODIATRIST

## 2024-06-24 NOTE — PROGRESS NOTES
Subjective:       Patient ID: Cheryl Peter is a 33 y.o. female.    Chief Complaint: Fungus (Possible nail fungus, rate pain 3/10, nondiabetic, pt is wearing slippers /)      HPI: Cheryl Peter presents to the office with complaints of abnormal appearance to the left 2nd  toe, due to dystrophic and thickened nail.  Reports having abnormal sensation with rubbing in shoes or socks.  States 3/10 pain with direct pressure. Reports no signs of cellulitis, ingrowing, or drainage.  States this is been present for proximally 4 years.  She reports she sought previous advice who recommend topical antifungal pains in the utilization Vicks vapor rub.  Reports that over 6 months.  Time, she felt no improvement. Patient's Primary Care Provider is Dayton Pepper MD.     Review of patient's allergies indicates:   Allergen Reactions    Flexeril [cyclobenzaprine] Palpitations    Penicillins Hives and Rash       Past Medical History:   Diagnosis Date    Cervical spondylosis     Fibromyalgia     Insomnia     Positive MERRY (antinuclear antibody)     Sjogren syndrome,     Dr. Hampton       Family History   Problem Relation Name Age of Onset    Diabetes Mother Amparo [place]     Hyperlipidemia Mother Amparo [place]     Miscarriages / Stillbirths Mother Amparo [place]     Heart disease Father Alex     Hyperlipidemia Father Alex     Diabetes Father Alex     Depression Father Alex     Cancer Maternal Grandmother Chacha     Arthritis Maternal Grandmother Chacha     Miscarriages / Stillbirths Maternal Grandmother Chacha     Cancer Maternal Grandfather Bill Bryn     Glaucoma Neg Hx      Macular degeneration Neg Hx         Social History     Socioeconomic History    Marital status:    Tobacco Use    Smoking status: Never    Smokeless tobacco: Never   Substance and Sexual Activity    Alcohol use: Yes     Comment: socially    Drug use: Never    Sexual activity: Yes     Partners: Male     Birth control/protection: I.U.D.  "    Social Determinants of Health     Financial Resource Strain: Low Risk  (2024)    Received from [facility]    Overall Financial Resource Strain (CARDIA)     Difficulty of Paying Living Expenses: Not hard at all   Food Insecurity: No Food Insecurity (2024)    Received from [facility]    Hunger Vital Sign     Worried About Running Out of Food in the Last Year: Never true     Ran Out of Food in the Last Year: Never true   Transportation Needs: No Transportation Needs (2024)    Received from [facility]    PRAPARE - Transportation     Lack of Transportation (Medical): No     Lack of Transportation (Non-Medical): No   Physical Activity: Inactive (2024)    Received from [facility]    Exercise Vital Sign     Days of Exercise per Week: 0 days     Minutes of Exercise per Session: 0 min   Stress: No Stress Concern Present (2024)    Received from [facility]    Citizen of [place] [employer] - Occupational Stress Questionnaire     Feeling of Stress : Only a little   Housing Stability: Low Risk  (2024)    Received from [facility]    Housing Stability Vital Sign     Unable to Pay for Housing in the Last Year: No     Number of Times Moved in the Last Year: 0     Homeless in the Last Year: No       Past Surgical History:   Procedure Laterality Date    ADENOIDECTOMY      ankle arthroscopy       SECTION      DILATION AND CURETTAGE OF UTERUS      TONSILLECTOMY         Review of Systems      Objective:   Ht 5' 2" (1.575 m)   Wt 56.7 kg (125 lb)   BMI 22.86 kg/m²     Physical Exam  LOWER EXTREMITY PHYSICAL EXAMINATION    NEUROLOGY: Sensation to light touch is intact. Proprioception is intact.  Vibratory sensation intact    VASCULAR:  The right dorsalis pedis pulse 2/4 and the right posterior tibial pulse 2/4.  The left dorsalis pedis pulse 2/4 and posterior tibial pulse on the left is 2/4.  Capillary refill is intact.  Pedal hair growth " intact    DERMATOLOGY:  Thickened discolored nail of the left foot of the 2nd toe.  The nail plate is dark and brown in color.  There is chalky subungual debris.  There is no signs of ingrowing to the medial lateral border.  Pain with dorsal plantar compression of the nail plate.  Mild edema noted.  There is no cellulitis noted.  No fluctuance.       Assessment:     1. Onychodystrophy        Plan:     Onychodystrophy  -     CULTURE, FUNGUS - SKIN, HAIR, OR NAILS        Thorough discussion is had with the patient today, concerning the diagnosis, its etiology, and the treatment algorithm at present.    Discussed pathology etiology associated with onychodystrophy versus onychomycosis.    Discussed treatment options regarding to the nail plate.  Discussed conservative options regarding topicals, oral Lamisil, temporary versus permanent avulsion in hopes that a new nail plate would grow in normally.  Discussed the risks and benefits of all of these medications.  Discussed the efficacy as relates to the patient's medical history.    Recommend to obtain KOH sample/fungal nail sample to determine if there is any underlying fungal elements present prior to proceeding with conservative versus other surgical procedures.        Future Appointments   Date Time Provider Department Center   7/18/2024  8:15 AM Chacha Heck PA-C [facility] [place]   11/12/2024 11:30 AM Kirby Hampton MD [facility] [place]

## 2024-06-25 LAB — FUNGUS BLD CULT: NORMAL

## 2024-07-03 DIAGNOSIS — B35.1 ONYCHOMYCOSIS DUE TO DERMATOPHYTE: Primary | ICD-10-CM

## 2024-07-03 RX ORDER — TERBINAFINE HYDROCHLORIDE 250 MG/1
250 TABLET ORAL DAILY
Qty: 30 TABLET | Refills: 2 | Status: SHIPPED | OUTPATIENT
Start: 2024-07-03 | End: 2024-10-01

## 2024-07-09 ENCOUNTER — PATIENT MESSAGE (OUTPATIENT)
Dept: ADMINISTRATIVE | Facility: HOSPITAL | Age: 34
End: 2024-07-09
Payer: COMMERCIAL

## 2024-07-18 ENCOUNTER — PATIENT MESSAGE (OUTPATIENT)
Dept: RHEUMATOLOGY | Facility: CLINIC | Age: 34
End: 2024-07-18
Payer: COMMERCIAL

## 2024-07-18 DIAGNOSIS — M35.00 SJOGREN'S SYNDROME, WITH UNSPECIFIED ORGAN INVOLVEMENT: Primary | ICD-10-CM

## 2024-07-19 RX ORDER — HYDROXYCHLOROQUINE SULFATE 200 MG/1
200 TABLET, FILM COATED ORAL DAILY
Qty: 30 TABLET | Refills: 3 | Status: SHIPPED | OUTPATIENT
Start: 2024-07-19

## 2024-07-19 NOTE — TELEPHONE ENCOUNTER
I reviewed recent labs which are stable. Plaquenil sent until f/u visit with Dr. Hampton in November

## 2024-08-12 ENCOUNTER — PATIENT MESSAGE (OUTPATIENT)
Dept: OTOLARYNGOLOGY | Facility: CLINIC | Age: 34
End: 2024-08-12
Payer: COMMERCIAL

## 2024-08-26 ENCOUNTER — OFFICE VISIT (OUTPATIENT)
Dept: FAMILY MEDICINE | Facility: CLINIC | Age: 34
End: 2024-08-26
Payer: COMMERCIAL

## 2024-08-26 ENCOUNTER — LAB VISIT (OUTPATIENT)
Dept: LAB | Facility: HOSPITAL | Age: 34
End: 2024-08-26
Attending: PHYSICIAN ASSISTANT
Payer: COMMERCIAL

## 2024-08-26 VITALS
BODY MASS INDEX: 23.45 KG/M2 | WEIGHT: 127.44 LBS | HEIGHT: 62 IN | DIASTOLIC BLOOD PRESSURE: 60 MMHG | HEART RATE: 84 BPM | OXYGEN SATURATION: 100 % | SYSTOLIC BLOOD PRESSURE: 110 MMHG

## 2024-08-26 DIAGNOSIS — R53.83 FATIGUE, UNSPECIFIED TYPE: ICD-10-CM

## 2024-08-26 DIAGNOSIS — J30.9 ALLERGIC RHINITIS WITH POSTNASAL DRIP: ICD-10-CM

## 2024-08-26 DIAGNOSIS — Z00.00 PREVENTATIVE HEALTH CARE: ICD-10-CM

## 2024-08-26 DIAGNOSIS — Z00.00 PREVENTATIVE HEALTH CARE: Primary | ICD-10-CM

## 2024-08-26 DIAGNOSIS — R09.82 ALLERGIC RHINITIS WITH POSTNASAL DRIP: ICD-10-CM

## 2024-08-26 LAB
25(OH)D3+25(OH)D2 SERPL-MCNC: 22 NG/ML (ref 30–96)
BASOPHILS # BLD AUTO: 0.02 K/UL (ref 0–0.2)
BASOPHILS NFR BLD: 0.4 % (ref 0–1.9)
CHOLEST SERPL-MCNC: 149 MG/DL (ref 120–199)
CHOLEST/HDLC SERPL: 2.6 {RATIO} (ref 2–5)
CTP QC/QA: YES
CTP QC/QA: YES
DIFFERENTIAL METHOD BLD: ABNORMAL
EOSINOPHIL # BLD AUTO: 0.1 K/UL (ref 0–0.5)
EOSINOPHIL NFR BLD: 1.3 % (ref 0–8)
ERYTHROCYTE [DISTWIDTH] IN BLOOD BY AUTOMATED COUNT: 12.3 % (ref 11.5–14.5)
ESTIMATED AVG GLUCOSE: 100 MG/DL (ref 68–131)
HBA1C MFR BLD: 5.1 % (ref 4–5.6)
HCT VFR BLD AUTO: 38.3 % (ref 37–48.5)
HDLC SERPL-MCNC: 58 MG/DL (ref 40–75)
HDLC SERPL: 38.9 % (ref 20–50)
HGB BLD-MCNC: 12.9 G/DL (ref 12–16)
IMM GRANULOCYTES # BLD AUTO: 0.01 K/UL (ref 0–0.04)
IMM GRANULOCYTES NFR BLD AUTO: 0.2 % (ref 0–0.5)
LDLC SERPL CALC-MCNC: 71.6 MG/DL (ref 63–159)
LYMPHOCYTES # BLD AUTO: 2 K/UL (ref 1–4.8)
LYMPHOCYTES NFR BLD: 36.9 % (ref 18–48)
MCH RBC QN AUTO: 31.8 PG (ref 27–31)
MCHC RBC AUTO-ENTMCNC: 33.7 G/DL (ref 32–36)
MCV RBC AUTO: 94 FL (ref 82–98)
MONOCYTES # BLD AUTO: 0.4 K/UL (ref 0.3–1)
MONOCYTES NFR BLD: 7.1 % (ref 4–15)
NEUTROPHILS # BLD AUTO: 2.9 K/UL (ref 1.8–7.7)
NEUTROPHILS NFR BLD: 54.1 % (ref 38–73)
NONHDLC SERPL-MCNC: 91 MG/DL
NRBC BLD-RTO: 0 /100 WBC
PLATELET # BLD AUTO: 267 K/UL (ref 150–450)
PMV BLD AUTO: 9.8 FL (ref 9.2–12.9)
RBC # BLD AUTO: 4.06 M/UL (ref 4–5.4)
S PYO RRNA THROAT QL PROBE: NEGATIVE
SARS-COV-2 RDRP RESP QL NAA+PROBE: NEGATIVE
TRIGL SERPL-MCNC: 97 MG/DL (ref 30–150)
TSH SERPL DL<=0.005 MIU/L-ACNC: 0.94 UIU/ML (ref 0.4–4)
WBC # BLD AUTO: 5.37 K/UL (ref 3.9–12.7)

## 2024-08-26 PROCEDURE — 80061 LIPID PANEL: CPT | Performed by: PHYSICIAN ASSISTANT

## 2024-08-26 PROCEDURE — 84443 ASSAY THYROID STIM HORMONE: CPT | Performed by: PHYSICIAN ASSISTANT

## 2024-08-26 PROCEDURE — 3078F DIAST BP <80 MM HG: CPT | Mod: CPTII,S$GLB,, | Performed by: PHYSICIAN ASSISTANT

## 2024-08-26 PROCEDURE — 1160F RVW MEDS BY RX/DR IN RCRD: CPT | Mod: CPTII,S$GLB,, | Performed by: PHYSICIAN ASSISTANT

## 2024-08-26 PROCEDURE — 99999 PR PBB SHADOW E&M-EST. PATIENT-LVL III: CPT | Mod: PBBFAC,,, | Performed by: PHYSICIAN ASSISTANT

## 2024-08-26 PROCEDURE — 36415 COLL VENOUS BLD VENIPUNCTURE: CPT | Mod: PO | Performed by: PHYSICIAN ASSISTANT

## 2024-08-26 PROCEDURE — 87635 SARS-COV-2 COVID-19 AMP PRB: CPT | Mod: QW,S$GLB,, | Performed by: PHYSICIAN ASSISTANT

## 2024-08-26 PROCEDURE — 99395 PREV VISIT EST AGE 18-39: CPT | Mod: S$GLB,,, | Performed by: PHYSICIAN ASSISTANT

## 2024-08-26 PROCEDURE — 3074F SYST BP LT 130 MM HG: CPT | Mod: CPTII,S$GLB,, | Performed by: PHYSICIAN ASSISTANT

## 2024-08-26 PROCEDURE — 1159F MED LIST DOCD IN RCRD: CPT | Mod: CPTII,S$GLB,, | Performed by: PHYSICIAN ASSISTANT

## 2024-08-26 PROCEDURE — 3008F BODY MASS INDEX DOCD: CPT | Mod: CPTII,S$GLB,, | Performed by: PHYSICIAN ASSISTANT

## 2024-08-26 PROCEDURE — 83036 HEMOGLOBIN GLYCOSYLATED A1C: CPT | Performed by: PHYSICIAN ASSISTANT

## 2024-08-26 PROCEDURE — 87880 STREP A ASSAY W/OPTIC: CPT | Mod: QW,S$GLB,, | Performed by: PHYSICIAN ASSISTANT

## 2024-08-26 PROCEDURE — 82306 VITAMIN D 25 HYDROXY: CPT | Performed by: PHYSICIAN ASSISTANT

## 2024-08-26 PROCEDURE — 85025 COMPLETE CBC W/AUTO DIFF WBC: CPT | Performed by: PHYSICIAN ASSISTANT

## 2024-08-26 RX ORDER — LEVOCETIRIZINE DIHYDROCHLORIDE 5 MG/1
5 TABLET, FILM COATED ORAL NIGHTLY
Qty: 30 TABLET | Refills: 11 | Status: SHIPPED | OUTPATIENT
Start: 2024-08-26 | End: 2025-08-26

## 2024-08-26 RX ORDER — AZELASTINE 1 MG/ML
1 SPRAY, METERED NASAL 2 TIMES DAILY
Qty: 30 ML | Refills: 3 | Status: SHIPPED | OUTPATIENT
Start: 2024-08-26 | End: 2025-08-26

## 2024-08-26 NOTE — PROGRESS NOTES
Subjective     Patient ID: Cheryl Peter is a 34 y.o. female.    Chief Complaint: Annual Exam      HPI  Pt is known to me, PCP Dr. Pepper.      Pt is a 31 year old female with fibromyalgia and ABA. She presents today for an annual exam. Just had a baby boy on 5/8/24! This is her second. Not breastfeeding. Feeling brain fog the past few months. 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 Feeling run down, throat has been scratchy with postnasal drip last few days, but she does struggle with allergies. Stopped her astelin and xyzal while pregnant and would like a refill. No fever, chills, GI upset, cough. Has aching, but that is her baseline. Recently restarted her plaquenil and hopes that will help with her generalized body aches soon. Followed by rheum for this.       Review of Systems   All other systems reviewed and are negative.         Objective     Physical Exam  Vitals and nursing note reviewed.   Constitutional:       General: She is not in acute distress.     Appearance: Normal appearance. She is not ill-appearing, toxic-appearing or diaphoretic.   HENT:      Head: Normocephalic and atraumatic.      Right Ear: Tympanic membrane, ear canal and external ear normal. There is no impacted cerumen.      Left Ear: Tympanic membrane, ear canal and external ear normal. There is no impacted cerumen.      Nose: No congestion or rhinorrhea.      Mouth/Throat:      Pharynx: No oropharyngeal exudate or posterior oropharyngeal erythema.   Eyes:      General: No scleral icterus.        Right eye: No discharge.         Left eye: No discharge.      Conjunctiva/sclera: Conjunctivae normal.      Pupils: Pupils are equal, round, and reactive to light.   Neck:      Thyroid: No thyroid mass, thyromegaly or thyroid tenderness.   Cardiovascular:      Rate and Rhythm: Normal rate and regular rhythm.      Pulses: Normal pulses.      Heart sounds: Normal heart sounds. No murmur heard.     No friction rub. No gallop.    Pulmonary:      Effort: Pulmonary effort is normal. No respiratory distress.      Breath sounds: Normal breath sounds. No stridor. No wheezing, rhonchi or rales.   Abdominal:      General: Abdomen is flat. Bowel sounds are normal.      Palpations: Abdomen is soft.   Musculoskeletal:         General: No deformity or signs of injury.      Cervical back: Normal range of motion and neck supple.      Right lower leg: No edema.      Left lower leg: No edema.   Lymphadenopathy:      Cervical: No cervical adenopathy.   Skin:     General: Skin is warm.      Coloration: Skin is not jaundiced or pale.      Findings: No lesion or rash.   Neurological:      General: No focal deficit present.      Mental Status: She is alert and oriented to person, place, and time. Mental status is at baseline.   Psychiatric:         Mood and Affect: Mood normal.         Behavior: Behavior normal.         Thought Content: Thought content normal.         Judgment: Judgment normal.       1. Preventative health care  - CBC Auto Differential; Future  - TSH; Future  - Lipid Panel; Future  - Hemoglobin A1C; Future    2. Allergic rhinitis with postnasal drip  - levocetirizine (XYZAL) 5 MG tablet; Take 1 tablet (5 mg total) by mouth every evening.  Dispense: 30 tablet; Refill: 11  - azelastine (ASTELIN) 137 mcg (0.1 %) nasal spray; 1 spray (137 mcg total) by Nasal route 2 (two) times daily.  Dispense: 30 mL; Refill: 3    3. Fatigue, unspecified type  -covid and strep test today  - Vitamin D; Future    RTC/ER precautions given. F/U with Dr. Pepper before end of year    Mercedes Reyes PA-C

## 2024-08-26 NOTE — PATIENT INSTRUCTIONS
A few reminders from today:    Refills sent for xyzal and astelin  Labs today  F/U Dr. Pepper in 3 months      Please go to ER/urgent care if after hours or symptoms persist/worsen.   Do not hesitate to get in touch with me should you have any further questions.     Thank you for trusting me with your care!  I wish you health and happiness.    -Mercedes Reyes PA-C

## 2024-08-27 ENCOUNTER — PATIENT MESSAGE (OUTPATIENT)
Dept: FAMILY MEDICINE | Facility: CLINIC | Age: 34
End: 2024-08-27
Payer: COMMERCIAL

## 2024-08-27 DIAGNOSIS — E55.9 VITAMIN D DEFICIENCY: Primary | ICD-10-CM

## 2024-10-24 ENCOUNTER — PATIENT MESSAGE (OUTPATIENT)
Dept: RHEUMATOLOGY | Facility: CLINIC | Age: 34
End: 2024-10-24
Payer: COMMERCIAL

## 2024-11-05 ENCOUNTER — PATIENT MESSAGE (OUTPATIENT)
Dept: RESEARCH | Facility: HOSPITAL | Age: 34
End: 2024-11-05
Payer: COMMERCIAL

## 2024-11-12 ENCOUNTER — OFFICE VISIT (OUTPATIENT)
Dept: RHEUMATOLOGY | Facility: CLINIC | Age: 34
End: 2024-11-12
Payer: COMMERCIAL

## 2024-11-12 VITALS
BODY MASS INDEX: 23.29 KG/M2 | WEIGHT: 126.56 LBS | DIASTOLIC BLOOD PRESSURE: 66 MMHG | SYSTOLIC BLOOD PRESSURE: 103 MMHG | HEART RATE: 67 BPM | HEIGHT: 62 IN

## 2024-11-12 DIAGNOSIS — Z15.89 HETEROZYGOUS MTHFR MUTATION C677T: ICD-10-CM

## 2024-11-12 DIAGNOSIS — R19.7 DIARRHEA, UNSPECIFIED TYPE: ICD-10-CM

## 2024-11-12 DIAGNOSIS — M79.7 FIBROMYALGIA: ICD-10-CM

## 2024-11-12 DIAGNOSIS — M35.00 SJOGREN'S SYNDROME, WITH UNSPECIFIED ORGAN INVOLVEMENT: Primary | ICD-10-CM

## 2024-11-12 DIAGNOSIS — K90.41 NCGS (NON-CELIAC GLUTEN SENSITIVITY): ICD-10-CM

## 2024-11-12 PROCEDURE — 3008F BODY MASS INDEX DOCD: CPT | Mod: CPTII,S$GLB,, | Performed by: INTERNAL MEDICINE

## 2024-11-12 PROCEDURE — 1160F RVW MEDS BY RX/DR IN RCRD: CPT | Mod: CPTII,S$GLB,, | Performed by: INTERNAL MEDICINE

## 2024-11-12 PROCEDURE — 99999 PR PBB SHADOW E&M-EST. PATIENT-LVL III: CPT | Mod: PBBFAC,,, | Performed by: INTERNAL MEDICINE

## 2024-11-12 PROCEDURE — 3044F HG A1C LEVEL LT 7.0%: CPT | Mod: CPTII,S$GLB,, | Performed by: INTERNAL MEDICINE

## 2024-11-12 PROCEDURE — 3078F DIAST BP <80 MM HG: CPT | Mod: CPTII,S$GLB,, | Performed by: INTERNAL MEDICINE

## 2024-11-12 PROCEDURE — 99215 OFFICE O/P EST HI 40 MIN: CPT | Mod: S$GLB,,, | Performed by: INTERNAL MEDICINE

## 2024-11-12 PROCEDURE — 3074F SYST BP LT 130 MM HG: CPT | Mod: CPTII,S$GLB,, | Performed by: INTERNAL MEDICINE

## 2024-11-12 PROCEDURE — 1159F MED LIST DOCD IN RCRD: CPT | Mod: CPTII,S$GLB,, | Performed by: INTERNAL MEDICINE

## 2024-11-12 RX ORDER — HYDROXYCHLOROQUINE SULFATE 200 MG/1
400 TABLET, FILM COATED ORAL DAILY
Qty: 60 TABLET | Refills: 5 | Status: SHIPPED | OUTPATIENT
Start: 2024-11-12 | End: 2025-05-11

## 2024-11-12 RX ORDER — PREDNISONE 1 MG/1
4 TABLET ORAL DAILY PRN
Qty: 120 TABLET | Refills: 3 | Status: SHIPPED | OUTPATIENT
Start: 2024-11-12 | End: 2025-02-10

## 2024-11-12 NOTE — PROGRESS NOTES
Subjective:     Patient ID:  Cheryl Peter    Chief Complaint:  Disease Management     History of Present Illness:  Pt is a 34 y.o. female MERRY positivity and gluten sensitivity.  last baby She has noticed an improvement in her joint pain since starting plaquenil at her last visit. She reports taking the med approx 3-4 days per week because she forgets to take it.  She primarily has pain in her neck, shoulders, thoracic spine, lumbar spine, and hips. Denies peripheral joint pain. She complains of more significant fatigue recently and is concerned about low iron.. She has intermittent numbness/burning that involves medial border scapula on the L side, which is unchanged since 2020. She is flaring     Current tx:  plaquenil         Rheumatologic History:   - Diagnosis/es:  - Positive serologies:  - Infectious screening labs:  - Previous Treatments:  - Current Treatments:     Interval History:   Hospitalization since last office visit: No    Patient Active Problem List    Diagnosis Date Noted    Vitamin D deficiency 2024    Fibromyalgia 2021    Iron deficiency 2021    Sinus tachycardia 2018     Past Surgical History:   Procedure Laterality Date    ADENOIDECTOMY      ankle arthroscopy       SECTION      DILATION AND CURETTAGE OF UTERUS      TONSILLECTOMY       Social History     Tobacco Use    Smoking status: Never    Smokeless tobacco: Never   Substance Use Topics    Alcohol use: Yes     Comment: socially    Drug use: Never     Family History   Problem Relation Name Age of Onset    Diabetes Mother Amparo Fawn     Hyperlipidemia Mother Amparo Greenwood     Miscarriages / Stillbirths Mother Amparo Greenwood     Heart disease Father Alex     Hyperlipidemia Father Alex     Diabetes Father Alex     Depression Father Alex     Cancer Maternal Grandmother Chacha     Arthritis Maternal Grandmother Chacha     Miscarriages / Stillbirths Maternal Grandmother Chacha     Cancer Maternal  Grandfather Ariel Clemente     Glaucoma Neg Hx      Macular degeneration Neg Hx       Review of patient's allergies indicates:   Allergen Reactions    Flexeril [cyclobenzaprine] Palpitations    Penicillins Hives and Rash       Review of Systems   Review of Systems   Constitutional:  Positive for fatigue. Negative for activity change, appetite change, chills, diaphoresis, fever and unexpected weight change.   HENT:  Negative for congestion, dental problem, ear discharge, ear pain, facial swelling, mouth sores, nosebleeds, postnasal drip, rhinorrhea, sinus pressure, sneezing, sore throat, tinnitus, trouble swallowing and voice change.    Eyes:  Negative for photophobia, pain, discharge, redness and itching.   Respiratory:  Positive for cough. Negative for apnea, chest tightness, shortness of breath and wheezing.    Cardiovascular:  Positive for leg swelling. Negative for chest pain and palpitations.   Gastrointestinal:  Positive for abdominal pain. Negative for abdominal distention, constipation, diarrhea, nausea and vomiting.   Endocrine: Negative for cold intolerance, heat intolerance, polydipsia and polyuria.   Genitourinary:  Negative for decreased urine volume, difficulty urinating, dysuria, flank pain, frequency, hematuria and urgency.   Musculoskeletal:  Positive for arthralgias, back pain, joint swelling, myalgias, neck pain and neck stiffness. Negative for gait problem.   Skin:  Negative for pallor, rash and wound.   Allergic/Immunologic: Negative for immunocompromised state.   Neurological:  Negative for dizziness, tremors, numbness and headaches.   Hematological:  Negative for adenopathy. Does not bruise/bleed easily.   Psychiatric/Behavioral:  Negative for sleep disturbance. The patient is not nervous/anxious.         Current Medications:  Current Outpatient Medications   Medication Instructions    azelastine (ASTELIN) 137 mcg, Nasal, 2 times daily    fluconazole (DIFLUCAN) 150 mg, Oral, Daily     "hydroxychloroquine (PLAQUENIL) 400 mg, Oral, Daily    levocetirizine (XYZAL) 5 mg, Oral, Nightly    predniSONE (DELTASONE) 4 mg, Oral, Daily PRN         Objective:     Vitals:    11/12/24 1600   BP: 103/66   Pulse: 67   Weight: 57.4 kg (126 lb 8.7 oz)   Height: 5' 2.01" (1.575 m)   PainSc:   4      Body mass index is 23.14 kg/m².     Physical Examinations:  Physical Exam   Constitutional: She is oriented to person, place, and time. No distress.   HENT:   Head: Normocephalic and atraumatic.   Eyes: Pupils are equal, round, and reactive to light. Right eye exhibits no discharge. Left eye exhibits no discharge.   Neck: No thyromegaly present.   Cardiovascular: Normal rate, regular rhythm and normal heart sounds. Exam reveals no gallop and no friction rub.   No murmur heard.  Pulmonary/Chest: Breath sounds normal. She has no wheezes. She has no rales. She exhibits no tenderness.   Abdominal: There is no abdominal tenderness. There is no rebound and no guarding.   Musculoskeletal:         General: Tenderness present. No deformity.      Right shoulder: Tenderness present.      Left shoulder: Tenderness present.      Right elbow: Normal.      Left elbow: Tenderness present.      Right wrist: Tenderness present.      Left wrist: Tenderness present.      Cervical back: Neck supple.      Right knee: Effusion present. Tenderness present.      Left knee: Effusion present. Tenderness present.   Lymphadenopathy:     She has no cervical adenopathy.   Neurological: She is alert and oriented to person, place, and time. Gait normal.   Skin: Skin is dry. No rash noted. No erythema. There is pallor.   Psychiatric: Mood and affect normal.   Vitals reviewed.      Right Side Rheumatological Exam     Examination finds the elbow normal.    The patient is tender to palpation of the shoulder, wrist, knee, 1st PIP, 1st MCP, 2nd PIP, 2nd MCP, 3rd PIP, 3rd MCP, 4th PIP, 4th MCP, 5th PIP and 5th MCP    She has swelling of the 1st PIP, 1st MCP, " 2nd PIP, 2nd MCP, 3rd PIP, 3rd MCP, 4th PIP, 4th MCP, 5th PIP and 5th MCP    Shoulder Exam   Tenderness Location: no tenderness    Range of Motion   Active abduction:  abnormal   Adduction: abnormal  Sensation: normal    Knee Exam   Patellofemoral Crepitus: positive  Effusion: positive  Sensation: normal    Hip Exam   Tenderness Location: posterior  Sensation: normal    Elbow/Wrist Exam   Tenderness Location: no tenderness  Sensation: normal    Left Side Rheumatological Exam     The patient is tender to palpation of the shoulder, elbow, wrist, knee, 1st PIP, 1st MCP, 2nd PIP, 2nd MCP, 3rd PIP, 3rd MCP, 4th PIP, 4th MCP, 5th PIP and 5th MCP.    She has swelling of the 1st PIP, 1st MCP, 2nd PIP, 2nd MCP, 3rd PIP, 3rd MCP, 4th PIP, 4th MCP, 5th PIP and 5th MCP    Shoulder Exam   Tenderness Location: no tenderness    Range of Motion   Active abduction:  abnormal   Sensation: normal    Knee Exam     Patellofemoral Crepitus: positive  Effusion: positive  Sensation: normal    Hip Exam   Tenderness Location: posterior  Sensation: normal    Elbow/Wrist Exam   Sensation: normal      Back/Neck Exam   General Inspection   Gait: normal          Disease Assessment Scores:  Patient's Global Assessment of arthritis (0-10): 1  Physician's Global Assessment of arthritis (0-10): 1  Number of Tender Joints (0-28): 1  Number of Swollen Joints (0-28): 1        3/9/2024     8:14 AM   Rapid3 Question Responses and Scores   MDHAQ Score 0.5   Psychologic Score 4.4   Pain Score 5   When you awakened in the morning OVER THE LAST WEEK, did you feel stiff? Yes   If Yes, please indicate the number of hours until you are as limber as you will be for the day 4   Fatigue Score 4   Global Health Score 5   RAPID3 Score 3.89       Monitoring Lab Results:  Lab Results   Component Value Date    WBC 5.37 08/26/2024    RBC 4.06 08/26/2024    HGB 12.9 08/26/2024    HCT 38.3 08/26/2024    MCV 94 08/26/2024    MCH 31.8 (H) 08/26/2024    MCHC 33.7 08/26/2024  "   RDW 12.3 08/26/2024     08/26/2024        Lab Results   Component Value Date     06/04/2024    K 4.4 06/04/2024     06/04/2024    CO2 24 06/04/2024    GLU 85 06/04/2024    BUN 7 06/04/2024    CREATININE 0.8 06/04/2024    CALCIUM 10.0 06/04/2024    PROT 7.0 06/04/2024    ALBUMIN 3.9 06/04/2024    BILITOT 0.3 06/04/2024    ALKPHOS 56 06/04/2024    AST 18 06/04/2024    ALT 14 06/04/2024    ANIONGAP 9 06/04/2024    EGFRNORACEVR >60.0 06/04/2024       Lab Results   Component Value Date    SEDRATE 38 (H) 03/12/2024    CRP 1.0 06/30/2022        Lab Results   Component Value Date    XYNYURXP96WZ 22 (L) 08/26/2024    FKYEFTIR93 373 04/07/2021        Lab Results   Component Value Date    CHOL 149 08/26/2024    HDL 58 08/26/2024    LDLCALC 71.6 08/26/2024    TRIG 97 08/26/2024       Lab Results   Component Value Date    RF <13.0 06/30/2022    CCPANTIBODIE <0.5 06/30/2022     Lab Results   Component Value Date    ANASCREEN Negative <1:80 03/12/2024    ANATITER 1:80 12/07/2022    DSDNA Negative 1:10 03/12/2024     Lab Results   Component Value Date    HLABB27 Negative 06/30/2022       Infectious Disease Screening:  Lab Results   Component Value Date    HEPBSAG Non-reactive 11/21/2023     No results found for: "HEPCAB", "HEPCVAB", "HCVQUANTRES"  No results found for: "TBGOLDPLUS", "QUANTTBGDPL"  No results found for: "QUANTIFERON", "SVCMT", "QUANTAGVALUE", "QUANTNILVALU", "QUANTMITOGEN", "QFTTBAG", "QINT"     Imaging:  DEXA, Xrays, MRIs, CTs, etc    Old & Outside Medical Records:  Reviewed old and all outside medical records available in Care Everywhere     Assessment:     Encounter Diagnoses   Name Primary?    Sjogren's syndrome, with unspecified organ involvement Yes    NCGS (non-celiac gluten sensitivity)     Heterozygous MTHFR mutation C677T     Fibromyalgia     Diarrhea, unspecified type        Plan:      Encounter Diagnoses   Name Primary?    Sjogren's syndrome, with unspecified organ involvement Yes "    NCGS (non-celiac gluten sensitivity)     Heterozygous MTHFR mutation C677T     Fibromyalgia     Diarrhea, unspecified type      Cheryl was seen today for disease management.    Diagnoses and all orders for this visit:    Sjogren's syndrome, with unspecified organ involvement  -     Sedimentation rate; Future  -     Sjogrens syndrome-B extractable nuclear antibody; Future  -     Sjogrens syndrome-A extractable nuclear antibody; Future  -     Comprehensive Metabolic Panel; Future  -     CBC Auto Differential; Future  -     Anti-Smith Antibody; Future  -     Anti-Scleroderma Antibody; Future  -     Anti-Histone Antibody; Future  -     Anti-DNA Ab, Double-Stranded; Future  -     Anti Sm/RNP Antibody; Future  -     MERRY Screen w/Reflex; Future  -     Clostridium difficile EIA; Future  -     H. pylori antigen, stool; Future  -     Giardia / Cryptosporidum, EIA; Future  -     Calprotectin, Stool; Future  -     Pancreatic elastase, fecal; Future  -     Lactoferrin, fecal, quantitative; Future  -     Stool culture; Future  -     Stool Exam-Ova,Cysts,Parasites; Future  -     predniSONE (DELTASONE) 1 MG tablet; Take 4 tablets (4 mg total) by mouth daily as needed (flare).  -     hydroxychloroquine (PLAQUENIL) 200 mg tablet; Take 2 tablets (400 mg total) by mouth once daily.    NCGS (non-celiac gluten sensitivity)  -     Sedimentation rate; Future  -     Sjogrens syndrome-B extractable nuclear antibody; Future  -     Sjogrens syndrome-A extractable nuclear antibody; Future  -     Comprehensive Metabolic Panel; Future  -     CBC Auto Differential; Future  -     Anti-Smith Antibody; Future  -     Anti-Scleroderma Antibody; Future  -     Anti-Histone Antibody; Future  -     Anti-DNA Ab, Double-Stranded; Future  -     Anti Sm/RNP Antibody; Future  -     MERRY Screen w/Reflex; Future  -     Clostridium difficile EIA; Future  -     H. pylori antigen, stool; Future  -     Giardia / Cryptosporidum, EIA; Future  -     Calprotectin,  Stool; Future  -     Pancreatic elastase, fecal; Future  -     Lactoferrin, fecal, quantitative; Future  -     Stool culture; Future  -     Stool Exam-Ova,Cysts,Parasites; Future  -     predniSONE (DELTASONE) 1 MG tablet; Take 4 tablets (4 mg total) by mouth daily as needed (flare).    Heterozygous MTHFR mutation C677T  -     Sedimentation rate; Future  -     Sjogrens syndrome-B extractable nuclear antibody; Future  -     Sjogrens syndrome-A extractable nuclear antibody; Future  -     Comprehensive Metabolic Panel; Future  -     CBC Auto Differential; Future  -     Anti-Smith Antibody; Future  -     Anti-Scleroderma Antibody; Future  -     Anti-Histone Antibody; Future  -     Anti-DNA Ab, Double-Stranded; Future  -     Anti Sm/RNP Antibody; Future  -     MERRY Screen w/Reflex; Future  -     Clostridium difficile EIA; Future  -     H. pylori antigen, stool; Future  -     Giardia / Cryptosporidum, EIA; Future  -     Calprotectin, Stool; Future  -     Pancreatic elastase, fecal; Future  -     Lactoferrin, fecal, quantitative; Future  -     Stool culture; Future  -     Stool Exam-Ova,Cysts,Parasites; Future  -     predniSONE (DELTASONE) 1 MG tablet; Take 4 tablets (4 mg total) by mouth daily as needed (flare).    Fibromyalgia  -     Sedimentation rate; Future  -     Sjogrens syndrome-B extractable nuclear antibody; Future  -     Sjogrens syndrome-A extractable nuclear antibody; Future  -     Comprehensive Metabolic Panel; Future  -     CBC Auto Differential; Future  -     Anti-Smith Antibody; Future  -     Anti-Scleroderma Antibody; Future  -     Anti-Histone Antibody; Future  -     Anti-DNA Ab, Double-Stranded; Future  -     Anti Sm/RNP Antibody; Future  -     MERRY Screen w/Reflex; Future  -     Clostridium difficile EIA; Future  -     H. pylori antigen, stool; Future  -     Giardia / Cryptosporidum, EIA; Future  -     Calprotectin, Stool; Future  -     Pancreatic elastase, fecal; Future  -     Lactoferrin, fecal,  quantitative; Future  -     Stool culture; Future  -     Stool Exam-Ova,Cysts,Parasites; Future  -     predniSONE (DELTASONE) 1 MG tablet; Take 4 tablets (4 mg total) by mouth daily as needed (flare).    Diarrhea, unspecified type  -     Clostridium difficile EIA; Future  -     H. pylori antigen, stool; Future  -     Giardia / Cryptosporidum, EIA; Future  -     Calprotectin, Stool; Future  -     Pancreatic elastase, fecal; Future  -     Lactoferrin, fecal, quantitative; Future  -     Stool culture; Future  -     Stool Exam-Ova,Cysts,Parasites; Future       Restart 400 mg  add prn prednisone 1 mg to 3 mg  1. Labs ordered  2. stool test  3.  F/u 4 month     Follow-up 4 months    More than 50% of the  54 minute encounter was spent face to face counseling the patient regarding current status and future plan of care as well as side effects  of the medications. All questions were answered to patient's satisfaction also includes  non-face to face time preparing to see the patient (eg, review of tests), Obtaining and/or reviewing separately obtained history, Documenting clinical information in the electronic or other health record, Independently interpreting results

## 2025-01-13 ENCOUNTER — LAB VISIT (OUTPATIENT)
Dept: LAB | Facility: HOSPITAL | Age: 35
End: 2025-01-13
Attending: PODIATRIST
Payer: COMMERCIAL

## 2025-01-13 DIAGNOSIS — E55.9 VITAMIN D DEFICIENCY: ICD-10-CM

## 2025-01-13 DIAGNOSIS — Z15.89 HETEROZYGOUS MTHFR MUTATION C677T: ICD-10-CM

## 2025-01-13 DIAGNOSIS — K90.41 NCGS (NON-CELIAC GLUTEN SENSITIVITY): ICD-10-CM

## 2025-01-13 DIAGNOSIS — B35.1 ONYCHOMYCOSIS DUE TO DERMATOPHYTE: ICD-10-CM

## 2025-01-13 DIAGNOSIS — M79.7 FIBROMYALGIA: ICD-10-CM

## 2025-01-13 DIAGNOSIS — M35.00 SJOGREN'S SYNDROME, WITH UNSPECIFIED ORGAN INVOLVEMENT: ICD-10-CM

## 2025-01-13 LAB
25(OH)D3+25(OH)D2 SERPL-MCNC: 17 NG/ML (ref 30–96)
ALBUMIN SERPL BCP-MCNC: 4.3 G/DL (ref 3.5–5.2)
ALBUMIN SERPL BCP-MCNC: 4.3 G/DL (ref 3.5–5.2)
ALP SERPL-CCNC: 39 U/L (ref 40–150)
ALP SERPL-CCNC: 39 U/L (ref 40–150)
ALT SERPL W/O P-5'-P-CCNC: 11 U/L (ref 10–44)
ALT SERPL W/O P-5'-P-CCNC: 11 U/L (ref 10–44)
ANION GAP SERPL CALC-SCNC: 7 MMOL/L (ref 8–16)
AST SERPL-CCNC: 16 U/L (ref 10–40)
AST SERPL-CCNC: 16 U/L (ref 10–40)
BASOPHILS # BLD AUTO: 0.02 K/UL (ref 0–0.2)
BASOPHILS NFR BLD: 0.3 % (ref 0–1.9)
BILIRUB DIRECT SERPL-MCNC: 0.1 MG/DL (ref 0.1–0.3)
BILIRUB SERPL-MCNC: 0.2 MG/DL (ref 0.1–1)
BILIRUB SERPL-MCNC: 0.2 MG/DL (ref 0.1–1)
BUN SERPL-MCNC: 11 MG/DL (ref 6–20)
CALCIUM SERPL-MCNC: 9.5 MG/DL (ref 8.7–10.5)
CHLORIDE SERPL-SCNC: 105 MMOL/L (ref 95–110)
CO2 SERPL-SCNC: 25 MMOL/L (ref 23–29)
CREAT SERPL-MCNC: 0.7 MG/DL (ref 0.5–1.4)
DIFFERENTIAL METHOD BLD: ABNORMAL
EOSINOPHIL # BLD AUTO: 0.1 K/UL (ref 0–0.5)
EOSINOPHIL NFR BLD: 1.1 % (ref 0–8)
ERYTHROCYTE [DISTWIDTH] IN BLOOD BY AUTOMATED COUNT: 12.8 % (ref 11.5–14.5)
ERYTHROCYTE [SEDIMENTATION RATE] IN BLOOD BY PHOTOMETRIC METHOD: 6 MM/HR (ref 0–36)
EST. GFR  (NO RACE VARIABLE): >60 ML/MIN/1.73 M^2
GLUCOSE SERPL-MCNC: 93 MG/DL (ref 70–110)
HCT VFR BLD AUTO: 37.7 % (ref 37–48.5)
HGB BLD-MCNC: 12 G/DL (ref 12–16)
IMM GRANULOCYTES # BLD AUTO: 0.02 K/UL (ref 0–0.04)
IMM GRANULOCYTES NFR BLD AUTO: 0.3 % (ref 0–0.5)
LYMPHOCYTES # BLD AUTO: 2.1 K/UL (ref 1–4.8)
LYMPHOCYTES NFR BLD: 32.8 % (ref 18–48)
MCH RBC QN AUTO: 30.6 PG (ref 27–31)
MCHC RBC AUTO-ENTMCNC: 31.8 G/DL (ref 32–36)
MCV RBC AUTO: 96 FL (ref 82–98)
MONOCYTES # BLD AUTO: 0.4 K/UL (ref 0.3–1)
MONOCYTES NFR BLD: 6.2 % (ref 4–15)
NEUTROPHILS # BLD AUTO: 3.7 K/UL (ref 1.8–7.7)
NEUTROPHILS NFR BLD: 59.3 % (ref 38–73)
NRBC BLD-RTO: 0 /100 WBC
PLATELET # BLD AUTO: 316 K/UL (ref 150–450)
PMV BLD AUTO: 9.4 FL (ref 9.2–12.9)
POTASSIUM SERPL-SCNC: 3.5 MMOL/L (ref 3.5–5.1)
PROT SERPL-MCNC: 7.6 G/DL (ref 6–8.4)
PROT SERPL-MCNC: 7.6 G/DL (ref 6–8.4)
RBC # BLD AUTO: 3.92 M/UL (ref 4–5.4)
SODIUM SERPL-SCNC: 137 MMOL/L (ref 136–145)
WBC # BLD AUTO: 6.29 K/UL (ref 3.9–12.7)

## 2025-01-13 PROCEDURE — 86235 NUCLEAR ANTIGEN ANTIBODY: CPT | Performed by: INTERNAL MEDICINE

## 2025-01-13 PROCEDURE — 86235 NUCLEAR ANTIGEN ANTIBODY: CPT | Mod: 59 | Performed by: INTERNAL MEDICINE

## 2025-01-13 PROCEDURE — 85652 RBC SED RATE AUTOMATED: CPT | Performed by: INTERNAL MEDICINE

## 2025-01-13 PROCEDURE — 80076 HEPATIC FUNCTION PANEL: CPT | Performed by: PODIATRIST

## 2025-01-13 PROCEDURE — 85025 COMPLETE CBC W/AUTO DIFF WBC: CPT | Performed by: INTERNAL MEDICINE

## 2025-01-13 PROCEDURE — 83516 IMMUNOASSAY NONANTIBODY: CPT | Performed by: INTERNAL MEDICINE

## 2025-01-13 PROCEDURE — 82306 VITAMIN D 25 HYDROXY: CPT | Performed by: PHYSICIAN ASSISTANT

## 2025-01-13 PROCEDURE — 86225 DNA ANTIBODY NATIVE: CPT | Performed by: INTERNAL MEDICINE

## 2025-01-13 PROCEDURE — 80053 COMPREHEN METABOLIC PANEL: CPT | Performed by: INTERNAL MEDICINE

## 2025-01-13 PROCEDURE — 86038 ANTINUCLEAR ANTIBODIES: CPT | Performed by: INTERNAL MEDICINE

## 2025-01-14 ENCOUNTER — TELEPHONE (OUTPATIENT)
Dept: FAMILY MEDICINE | Facility: CLINIC | Age: 35
End: 2025-01-14
Payer: COMMERCIAL

## 2025-01-14 ENCOUNTER — PATIENT MESSAGE (OUTPATIENT)
Dept: FAMILY MEDICINE | Facility: CLINIC | Age: 35
End: 2025-01-14
Payer: COMMERCIAL

## 2025-01-14 DIAGNOSIS — E55.9 VITAMIN D DEFICIENCY: Primary | ICD-10-CM

## 2025-01-14 LAB — ANA SER QL IF: NORMAL

## 2025-01-14 RX ORDER — ASPIRIN 325 MG
50000 TABLET, DELAYED RELEASE (ENTERIC COATED) ORAL
Qty: 4 CAPSULE | Refills: 0 | Status: SHIPPED | OUTPATIENT
Start: 2025-01-14 | End: 2025-02-05

## 2025-01-15 LAB
ANTI SM ANTIBODY: 0.1 RATIO (ref 0–0.99)
ANTI SM/RNP ANTIBODY: 0.08 RATIO (ref 0–0.99)
ANTI-SM INTERPRETATION: NEGATIVE
ANTI-SM/RNP INTERPRETATION: NEGATIVE
ANTI-SSA ANTIBODY: 0.09 RATIO (ref 0–0.99)
ANTI-SSA INTERPRETATION: NEGATIVE
ANTI-SSB ANTIBODY: 0.1 RATIO (ref 0–0.99)
ANTI-SSB INTERPRETATION: NEGATIVE
DSDNA AB SER-ACNC: NORMAL [IU]/ML

## 2025-01-16 LAB — ENA SCL70 AB SER-ACNC: <0.6 U/ML

## 2025-01-17 LAB — HISTONE IGG SER IA-ACNC: 0.4 UNITS (ref 0–0.9)

## 2025-01-29 ENCOUNTER — PATIENT MESSAGE (OUTPATIENT)
Dept: RHEUMATOLOGY | Facility: CLINIC | Age: 35
End: 2025-01-29
Payer: COMMERCIAL

## 2025-05-12 ENCOUNTER — RESULTS FOLLOW-UP (OUTPATIENT)
Dept: FAMILY MEDICINE | Facility: CLINIC | Age: 35
End: 2025-05-12

## 2025-05-12 ENCOUNTER — PATIENT MESSAGE (OUTPATIENT)
Dept: FAMILY MEDICINE | Facility: CLINIC | Age: 35
End: 2025-05-12

## 2025-05-12 ENCOUNTER — OFFICE VISIT (OUTPATIENT)
Dept: FAMILY MEDICINE | Facility: CLINIC | Age: 35
End: 2025-05-12
Payer: COMMERCIAL

## 2025-05-12 VITALS
SYSTOLIC BLOOD PRESSURE: 92 MMHG | OXYGEN SATURATION: 97 % | DIASTOLIC BLOOD PRESSURE: 80 MMHG | BODY MASS INDEX: 23.13 KG/M2 | WEIGHT: 125.69 LBS | HEIGHT: 62 IN

## 2025-05-12 DIAGNOSIS — R39.14 FEELING OF INCOMPLETE BLADDER EMPTYING: ICD-10-CM

## 2025-05-12 DIAGNOSIS — N76.0 ACUTE VAGINITIS: ICD-10-CM

## 2025-05-12 DIAGNOSIS — N76.0 ACUTE VAGINITIS: Primary | ICD-10-CM

## 2025-05-12 DIAGNOSIS — R30.0 DYSURIA: Primary | ICD-10-CM

## 2025-05-12 DIAGNOSIS — N30.01 ACUTE CYSTITIS WITH HEMATURIA: ICD-10-CM

## 2025-05-12 LAB
BACTERIA #/AREA URNS HPF: ABNORMAL /HPF
BACTERIA GENITAL QL WET PREP: ABNORMAL
BILIRUB UR QL STRIP.AUTO: NEGATIVE
CLARITY UR: ABNORMAL
CLUE CELLS VAG QL WET PREP: ABNORMAL
COLOR UR AUTO: YELLOW
FILAMENT FUNGI VAG WET PREP-#/AREA: ABNORMAL
GLUCOSE UR QL STRIP: NEGATIVE
HGB UR QL STRIP: ABNORMAL
KETONES UR QL STRIP: NEGATIVE
LEUKOCYTE ESTERASE UR QL STRIP: ABNORMAL
MICROSCOPIC COMMENT: ABNORMAL
NITRITE UR QL STRIP: NEGATIVE
PH UR STRIP: 6 [PH]
PROT UR QL STRIP: NEGATIVE
RBC #/AREA URNS HPF: 10 /HPF (ref 0–4)
SP GR UR STRIP: >=1.03
SQUAMOUS #/AREA URNS HPF: 5 /HPF
T VAGINALIS GENITAL QL WET PREP: ABNORMAL
WBC #/AREA URNS HPF: 50 /HPF (ref 0–5)
WBC #/AREA VAG WET PREP: ABNORMAL
YEAST GENITAL QL WET PREP: ABNORMAL

## 2025-05-12 PROCEDURE — 1159F MED LIST DOCD IN RCRD: CPT | Mod: CPTII,S$GLB,, | Performed by: PHYSICIAN ASSISTANT

## 2025-05-12 PROCEDURE — 3079F DIAST BP 80-89 MM HG: CPT | Mod: CPTII,S$GLB,, | Performed by: PHYSICIAN ASSISTANT

## 2025-05-12 PROCEDURE — 87210 SMEAR WET MOUNT SALINE/INK: CPT | Mod: PO | Performed by: PHYSICIAN ASSISTANT

## 2025-05-12 PROCEDURE — 99999 PR PBB SHADOW E&M-EST. PATIENT-LVL III: CPT | Mod: PBBFAC,,, | Performed by: PHYSICIAN ASSISTANT

## 2025-05-12 PROCEDURE — 1160F RVW MEDS BY RX/DR IN RCRD: CPT | Mod: CPTII,S$GLB,, | Performed by: PHYSICIAN ASSISTANT

## 2025-05-12 PROCEDURE — 3074F SYST BP LT 130 MM HG: CPT | Mod: CPTII,S$GLB,, | Performed by: PHYSICIAN ASSISTANT

## 2025-05-12 PROCEDURE — 87086 URINE CULTURE/COLONY COUNT: CPT | Performed by: PHYSICIAN ASSISTANT

## 2025-05-12 PROCEDURE — 99214 OFFICE O/P EST MOD 30 MIN: CPT | Mod: S$GLB,,, | Performed by: PHYSICIAN ASSISTANT

## 2025-05-12 PROCEDURE — 3008F BODY MASS INDEX DOCD: CPT | Mod: CPTII,S$GLB,, | Performed by: PHYSICIAN ASSISTANT

## 2025-05-12 PROCEDURE — 81003 URINALYSIS AUTO W/O SCOPE: CPT | Mod: PO | Performed by: PHYSICIAN ASSISTANT

## 2025-05-12 RX ORDER — NITROFURANTOIN 25; 75 MG/1; MG/1
100 CAPSULE ORAL 2 TIMES DAILY
Qty: 10 CAPSULE | Refills: 0 | Status: SHIPPED | OUTPATIENT
Start: 2025-05-12 | End: 2025-05-17

## 2025-05-12 RX ORDER — FLUCONAZOLE 150 MG/1
150 TABLET ORAL DAILY
Qty: 1 TABLET | Refills: 0 | Status: SHIPPED | OUTPATIENT
Start: 2025-05-12 | End: 2025-05-13

## 2025-05-12 NOTE — PROGRESS NOTES
"Subjective     Patient ID: Cheryl Peter is a 34 y.o. female.    Chief Complaint: Referral (urology)      HPI    Pt is known to me, PCP Dr. Pepper.      Pt is a 34 year old female with fibromyalgia and ABA. She presents today to discuss potential referral to urology. Since having her second child about a year ago, she has felt like it is difficult to fully empty her bladder. Also states that the day after she drinks alcohol, she experiences dysuria and pelvic discomfort, although she may not have a UTI. For the last 2 weeks, she has felt dysuria, urinary frequency, pelvic discomfort. No flank pain. No hematuria. No fever, chills. Recently took bactrim for a finger infection, from 4/27-5/4/25. Urinary symptoms did not improve after taking the bactrim.     Since finishing bactrim, she admits to vaginal irritation, itching. Has been using a "otc yeast infection supplement" since symptoms started. LMP started today.     Review of Systems   All other systems reviewed and are negative.         Objective     Physical Exam  Constitutional:       General: She is not in acute distress.     Appearance: Normal appearance. She is not ill-appearing, toxic-appearing or diaphoretic.   HENT:      Head: Normocephalic and atraumatic.   Cardiovascular:      Heart sounds: Normal heart sounds. No murmur heard.     No friction rub. No gallop.   Pulmonary:      Effort: No respiratory distress.      Breath sounds: Normal breath sounds. No stridor. No wheezing, rhonchi or rales.   Chest:      Chest wall: No tenderness.   Abdominal:      General: Abdomen is flat. Bowel sounds are normal. There is no distension.      Palpations: Abdomen is soft.      Tenderness: There is abdominal tenderness (mild suprapubic tenderness).   Musculoskeletal:      Right lower leg: No edema.      Left lower leg: No edema.   Neurological:      General: No focal deficit present.      Mental Status: She is alert and oriented to person, place, and time. Mental " status is at baseline.   Psychiatric:         Mood and Affect: Mood normal.         Behavior: Behavior normal.         Thought Content: Thought content normal.         Judgment: Judgment normal.       1. Dysuria (Primary)  - Urinalysis; Future  - Urine culture; Future  -update, urine shows uti, treat with macrobid  -tylenol prn, increase water intake    2. Feeling of incomplete bladder emptying  - Ambulatory referral/consult to Urology; Future    3. Acute vaginitis  - Wet Prep, Genital  -normal wet prep, treat for suspected yeast with diflucan    RTC/ER precautions given. F/U with me prn if symptoms persist or worsen. F/u in August with Dr. Pepper for annual    Mercedes Reyes PA-C

## 2025-05-12 NOTE — PATIENT INSTRUCTIONS
A few reminders from today:    Schedule urology  Urine sample today  Vaginal swab today  Drink plenty of water  Tylenol as needed for discomfort  F/U August Dr. Pepper for annual    Follow up with me if needed.   Please go to ER/urgent care if symptoms persist/worsen, especially if after hours.     Do not hesitate to get in touch with me should you have any further questions.     Thank you for trusting me with your care!  I wish you health and happiness.    -Mercedes Reyes PA-C

## 2025-05-14 LAB — BACTERIA UR CULT: ABNORMAL

## 2025-05-30 ENCOUNTER — OFFICE VISIT (OUTPATIENT)
Dept: UROLOGY | Facility: CLINIC | Age: 35
End: 2025-05-30
Payer: COMMERCIAL

## 2025-05-30 VITALS — WEIGHT: 117.06 LBS | BODY MASS INDEX: 21.54 KG/M2 | HEIGHT: 62 IN

## 2025-05-30 DIAGNOSIS — R39.14 FEELING OF INCOMPLETE BLADDER EMPTYING: ICD-10-CM

## 2025-05-30 DIAGNOSIS — Z79.2 PROPHYLACTIC ANTIBIOTIC: Primary | ICD-10-CM

## 2025-05-30 LAB
BILIRUBIN, UA POC OHS: NEGATIVE
BLOOD, UA POC OHS: ABNORMAL
CLARITY, UA POC OHS: CLEAR
COLOR, UA POC OHS: YELLOW
GLUCOSE, UA POC OHS: NEGATIVE
KETONES, UA POC OHS: NEGATIVE
LEUKOCYTES, UA POC OHS: ABNORMAL
NITRITE, UA POC OHS: NEGATIVE
PH, UA POC OHS: 6.5
POC RESIDUAL URINE VOLUME: 0 ML (ref 0–100)
PROTEIN, UA POC OHS: NEGATIVE
SPECIFIC GRAVITY, UA POC OHS: 1.01
UROBILINOGEN, UA POC OHS: 0.2

## 2025-05-30 PROCEDURE — 99999 PR PBB SHADOW E&M-EST. PATIENT-LVL III: CPT | Mod: PBBFAC,,,

## 2025-05-30 RX ORDER — HYDROXYCHLOROQUINE SULFATE 200 MG/1
200 TABLET, FILM COATED ORAL DAILY
COMMUNITY

## 2025-05-30 RX ORDER — MUPIROCIN 20 MG/G
OINTMENT TOPICAL 3 TIMES DAILY
COMMUNITY
Start: 2025-04-27

## 2025-05-30 RX ORDER — NITROFURANTOIN MACROCRYSTALS 50 MG/1
100 CAPSULE ORAL NIGHTLY
Qty: 30 CAPSULE | Refills: 6 | Status: SHIPPED | OUTPATIENT
Start: 2025-05-30 | End: 2026-05-30

## 2025-05-30 NOTE — PROGRESS NOTES
[facility] Note  Staff: Nhung Ahmadi FNP-C    PCP: MD Shantelle    Chief Complaint: feeling incomplete emptying    Subjective:        HPI: Cheryl Peter is a 34 y.o. female NEW PATIENT presents today for evaluation of feeling like she does not empty her bladder. She states since having her children, she feels she doesn't empty her bladder. She states when she leans forward, more urine will come out. She denies dysuria, hematuria, fever, flank pain, and straining to urinate. She denies constipation.    We discussed a referral to PFPT and she will consider.     Questions asked pt during office visit today:  Urgency:No, incontinence with urgency? No;   DysuriaNo  Gross HematuriaNo  History of UTI: Yes     History of Kidney Stones?:  no    Constipation issues?:  no    PVR by bladder scan performed by MA today:  0 mL    REVIEW OF SYSTEMS:  Review of Systems   Constitutional: Negative.  Negative for chills and fever.   Gastrointestinal: Negative.  Negative for abdominal pain, constipation, diarrhea, nausea and vomiting.   Genitourinary: Negative.  Negative for dysuria, flank pain, frequency, hematuria and urgency.   Musculoskeletal: Negative.  Negative for back pain.       PMHx:  Past Medical History:   Diagnosis Date    Cervical spondylosis     Fibromyalgia     Insomnia     Positive MERRY (antinuclear antibody)     Sjogren syndrome,     Dr. Hampton       PSHx:  Past Surgical History:   Procedure Laterality Date    ADENOIDECTOMY      ankle arthroscopy       SECTION      DILATION AND CURETTAGE OF UTERUS      TONSILLECTOMY         Fam Hx:   malignancies: Yes - maternal grandfather prostate cancer , gyn malignancies: No   kidney stones: Yes - father     Soc Hx:  , lives in [place]    Allergies:  Flexeril [cyclobenzaprine] and Penicillins    Medications: reviewed     Objective:   There were no vitals filed for this visit.    Physical Exam  Constitutional:       Appearance:  Normal appearance.   HENT:      Head: Normocephalic.   Pulmonary:      Effort: Pulmonary effort is normal.   Abdominal:      General: There is no distension.      Palpations: Abdomen is soft.      Tenderness: There is no abdominal tenderness.   Musculoskeletal:         General: Normal range of motion.      Cervical back: Normal range of motion.   Skin:     General: Skin is warm.   Neurological:      Mental Status: She is oriented to person, place, and time.   Psychiatric:         Mood and Affect: Mood normal.         Behavior: Behavior normal.         LABS REVIEW:  UA today:   Color:Clear, Yellow  Spec. Grav.  1.010  PH  6.5  Negative for nitrates, protein, glucose, ketones, urobili, bili  Trace blood and leuks    Assessment:       1. Prophylactic antibiotic    2. Feeling of incomplete bladder emptying          Plan:      Macrobid 50mg as needed prescribed    For your recurrent UTIs:  Behavioral changes to help decrease UTI recurrences   -increase water intake (6 to 8 bottles water per day)   -don't hold urine, void every 2 to 3 hours   -prevent constipation (miralax capful daily if necessary) to have a bowel movement daily and keep stools soft  -cut down on caffeine,drink mainly water  -no douching   -void immediately after sexual intercourse  -wipe front to back     OTC meds to try (go to the pharmacist and ask where they are, they are over the counter)  -cranberry pills 500mg tabs TID, can buy over the counter  -take a probiotic daily designed for vaginal and urinary health  -D-Mannose once daily over the counter    IMPORTANT: If you feel like you have a UTI coming on, call our office and talk to one of the nurses. We will have you drop off a urine here in clinic or at one of our [facility] facilities. I do not typically like to write for antibiotics unless we have a urine culture. Avoid going to urgent care. We need to start documenting your urine cultures here in our office to see if they are really recurrent UTIs  or sx of UTIs.     If you have fever and it doesn't improve with tylenol or ibuprofen or if you have flank pain associated with the uti symptoms go to the ER.     If you do continue to have positive urine cultures then we may proceed with doing a cystoscopy (to look in your bladder) and an imaging study.     F/u as needed    [facility]: active    Nhung Ahmadi, LISA

## 2025-07-14 ENCOUNTER — LAB VISIT (OUTPATIENT)
Dept: LAB | Facility: HOSPITAL | Age: 35
End: 2025-07-14
Attending: INTERNAL MEDICINE
Payer: COMMERCIAL

## 2025-07-14 ENCOUNTER — OFFICE VISIT (OUTPATIENT)
Dept: RHEUMATOLOGY | Facility: CLINIC | Age: 35
End: 2025-07-14
Payer: COMMERCIAL

## 2025-07-14 VITALS
DIASTOLIC BLOOD PRESSURE: 62 MMHG | SYSTOLIC BLOOD PRESSURE: 100 MMHG | HEIGHT: 62 IN | HEART RATE: 91 BPM | WEIGHT: 114.88 LBS | BODY MASS INDEX: 21.14 KG/M2

## 2025-07-14 DIAGNOSIS — M35.7 BENIGN JOINT HYPERMOBILITY: Primary | ICD-10-CM

## 2025-07-14 DIAGNOSIS — M35.00 SJOGREN'S SYNDROME, WITH UNSPECIFIED ORGAN INVOLVEMENT: ICD-10-CM

## 2025-07-14 DIAGNOSIS — Z15.89 HETEROZYGOUS MTHFR MUTATION C677T: ICD-10-CM

## 2025-07-14 DIAGNOSIS — M79.7 FIBROMYALGIA: ICD-10-CM

## 2025-07-14 DIAGNOSIS — K90.41 NCGS (NON-CELIAC GLUTEN SENSITIVITY): ICD-10-CM

## 2025-07-14 DIAGNOSIS — Z79.899 HIGH RISK MEDICATION USE: ICD-10-CM

## 2025-07-14 DIAGNOSIS — M35.7 BENIGN JOINT HYPERMOBILITY: ICD-10-CM

## 2025-07-14 LAB
25(OH)D3+25(OH)D2 SERPL-MCNC: 28 NG/ML (ref 30–96)
ABSOLUTE EOSINOPHIL (OHS): 0.03 K/UL
ABSOLUTE MONOCYTE (OHS): 0.47 K/UL (ref 0.3–1)
ABSOLUTE NEUTROPHIL COUNT (OHS): 4.36 K/UL (ref 1.8–7.7)
ALBUMIN SERPL BCP-MCNC: 4.8 G/DL (ref 3.5–5.2)
ALP SERPL-CCNC: 40 UNIT/L (ref 40–150)
ALT SERPL W/O P-5'-P-CCNC: 10 UNIT/L (ref 10–44)
ANION GAP (OHS): 6 MMOL/L (ref 8–16)
AST SERPL-CCNC: 13 UNIT/L (ref 11–45)
BASOPHILS # BLD AUTO: 0.03 K/UL
BASOPHILS NFR BLD AUTO: 0.4 %
BILIRUB SERPL-MCNC: 0.2 MG/DL (ref 0.1–1)
BUN SERPL-MCNC: 12 MG/DL (ref 6–20)
CALCIUM SERPL-MCNC: 10 MG/DL (ref 8.7–10.5)
CHLORIDE SERPL-SCNC: 106 MMOL/L (ref 95–110)
CO2 SERPL-SCNC: 27 MMOL/L (ref 23–29)
CREAT SERPL-MCNC: 0.8 MG/DL (ref 0.5–1.4)
ERYTHROCYTE [DISTWIDTH] IN BLOOD BY AUTOMATED COUNT: 13.2 % (ref 11.5–14.5)
ERYTHROCYTE [SEDIMENTATION RATE] IN BLOOD BY PHOTOMETRIC METHOD: 3 MM/HR
GFR SERPLBLD CREATININE-BSD FMLA CKD-EPI: >60 ML/MIN/1.73/M2
GLUCOSE SERPL-MCNC: 85 MG/DL (ref 70–110)
HCT VFR BLD AUTO: 41.1 % (ref 37–48.5)
HGB BLD-MCNC: 13.5 GM/DL (ref 12–16)
IMM GRANULOCYTES # BLD AUTO: 0.02 K/UL (ref 0–0.04)
IMM GRANULOCYTES NFR BLD AUTO: 0.3 % (ref 0–0.5)
IRON SATN MFR SERPL: 7 % (ref 20–50)
IRON SERPL-MCNC: 33 UG/DL (ref 30–160)
LYMPHOCYTES # BLD AUTO: 1.89 K/UL (ref 1–4.8)
MCH RBC QN AUTO: 30.8 PG (ref 27–31)
MCHC RBC AUTO-ENTMCNC: 32.8 G/DL (ref 32–36)
MCV RBC AUTO: 94 FL (ref 82–98)
NUCLEATED RBC (/100WBC) (OHS): 0 /100 WBC
PLATELET # BLD AUTO: 344 K/UL (ref 150–450)
PMV BLD AUTO: 10.2 FL (ref 9.2–12.9)
POTASSIUM SERPL-SCNC: 4.3 MMOL/L (ref 3.5–5.1)
PROT SERPL-MCNC: 7.9 GM/DL (ref 6–8.4)
RBC # BLD AUTO: 4.39 M/UL (ref 4–5.4)
RELATIVE EOSINOPHIL (OHS): 0.4 %
RELATIVE LYMPHOCYTE (OHS): 27.8 % (ref 18–48)
RELATIVE MONOCYTE (OHS): 6.9 % (ref 4–15)
RELATIVE NEUTROPHIL (OHS): 64.2 % (ref 38–73)
SODIUM SERPL-SCNC: 139 MMOL/L (ref 136–145)
TIBC SERPL-MCNC: 481 UG/DL (ref 250–450)
TRANSFERRIN SERPL-MCNC: 325 MG/DL (ref 200–375)
WBC # BLD AUTO: 6.8 K/UL (ref 3.9–12.7)

## 2025-07-14 PROCEDURE — 86225 DNA ANTIBODY NATIVE: CPT

## 2025-07-14 PROCEDURE — 3078F DIAST BP <80 MM HG: CPT | Mod: CPTII,S$GLB,, | Performed by: INTERNAL MEDICINE

## 2025-07-14 PROCEDURE — 36415 COLL VENOUS BLD VENIPUNCTURE: CPT | Mod: PO

## 2025-07-14 PROCEDURE — 86235 NUCLEAR ANTIGEN ANTIBODY: CPT | Mod: 59

## 2025-07-14 PROCEDURE — 3008F BODY MASS INDEX DOCD: CPT | Mod: CPTII,S$GLB,, | Performed by: INTERNAL MEDICINE

## 2025-07-14 PROCEDURE — 99999 PR PBB SHADOW E&M-EST. PATIENT-LVL III: CPT | Mod: PBBFAC,,, | Performed by: INTERNAL MEDICINE

## 2025-07-14 PROCEDURE — 3074F SYST BP LT 130 MM HG: CPT | Mod: CPTII,S$GLB,, | Performed by: INTERNAL MEDICINE

## 2025-07-14 PROCEDURE — 82306 VITAMIN D 25 HYDROXY: CPT

## 2025-07-14 PROCEDURE — 86038 ANTINUCLEAR ANTIBODIES: CPT

## 2025-07-14 PROCEDURE — 84466 ASSAY OF TRANSFERRIN: CPT

## 2025-07-14 PROCEDURE — 1160F RVW MEDS BY RX/DR IN RCRD: CPT | Mod: CPTII,S$GLB,, | Performed by: INTERNAL MEDICINE

## 2025-07-14 PROCEDURE — 80053 COMPREHEN METABOLIC PANEL: CPT

## 2025-07-14 PROCEDURE — 85025 COMPLETE CBC W/AUTO DIFF WBC: CPT

## 2025-07-14 PROCEDURE — 96372 THER/PROPH/DIAG INJ SC/IM: CPT | Mod: S$GLB,,, | Performed by: INTERNAL MEDICINE

## 2025-07-14 PROCEDURE — 86235 NUCLEAR ANTIGEN ANTIBODY: CPT

## 2025-07-14 PROCEDURE — 83516 IMMUNOASSAY NONANTIBODY: CPT

## 2025-07-14 PROCEDURE — 85652 RBC SED RATE AUTOMATED: CPT

## 2025-07-14 PROCEDURE — 1159F MED LIST DOCD IN RCRD: CPT | Mod: CPTII,S$GLB,, | Performed by: INTERNAL MEDICINE

## 2025-07-14 PROCEDURE — 99214 OFFICE O/P EST MOD 30 MIN: CPT | Mod: 25,S$GLB,, | Performed by: INTERNAL MEDICINE

## 2025-07-14 RX ORDER — CYANOCOBALAMIN 1000 UG/ML
1000 INJECTION, SOLUTION INTRAMUSCULAR; SUBCUTANEOUS
Status: COMPLETED | OUTPATIENT
Start: 2025-07-14 | End: 2025-07-14

## 2025-07-14 RX ORDER — METHYLPREDNISOLONE ACETATE 80 MG/ML
80 INJECTION, SUSPENSION INTRA-ARTICULAR; INTRALESIONAL; INTRAMUSCULAR; SOFT TISSUE
Status: COMPLETED | OUTPATIENT
Start: 2025-07-14 | End: 2025-07-14

## 2025-07-14 RX ORDER — KETOROLAC TROMETHAMINE 30 MG/ML
60 INJECTION, SOLUTION INTRAMUSCULAR; INTRAVENOUS
Status: COMPLETED | OUTPATIENT
Start: 2025-07-14 | End: 2025-07-14

## 2025-07-14 RX ADMIN — KETOROLAC TROMETHAMINE 60 MG: 30 INJECTION, SOLUTION INTRAMUSCULAR; INTRAVENOUS at 12:07

## 2025-07-14 RX ADMIN — CYANOCOBALAMIN 1000 MCG: 1000 INJECTION, SOLUTION INTRAMUSCULAR; SUBCUTANEOUS at 12:07

## 2025-07-14 RX ADMIN — METHYLPREDNISOLONE ACETATE 80 MG: 80 INJECTION, SUSPENSION INTRA-ARTICULAR; INTRALESIONAL; INTRAMUSCULAR; SOFT TISSUE at 12:07

## 2025-07-14 ASSESSMENT — ROUTINE ASSESSMENT OF PATIENT INDEX DATA (RAPID3)
PATIENT GLOBAL ASSESSMENT SCORE: 3
MDHAQ FUNCTION SCORE: 0.2
PSYCHOLOGICAL DISTRESS SCORE: 1.1
PAIN SCORE: 4
FATIGUE SCORE: 2.2
TOTAL RAPID3 SCORE: 2.56

## 2025-07-14 NOTE — PROGRESS NOTES
2-patient identifiers confirmed, allergies reviewed, injection education discussed with patient.  Injections tolerated well with no C/O of pain or complications, see MAR for injection information.  Patient exited room ambulatory in stable condition.  LELIA Bynum LPN

## 2025-07-14 NOTE — PROGRESS NOTES
Subjective:     Patient ID:  Cheryl Peter    Chief Complaint:  Disease Management     History of Present Illness:  Pt is a 35 y.o. female sjogren's dz, MERRY positivity and gluten sensitivity.  last baby She has noticed an improvement in her joint pain since starting plaquenil at her last visit. She reports taking the med approx 3-4 days per week because she forgets to take it.  She primarily has pain in her neck, shoulders, thoracic spine, lumbar spine, and hips. Denies peripheral joint pain. She complains of more significant fatigue recently and is concerned about low iron.. She has intermittent numbness/burning that involves medial border scapula on the L side, which is unchanged since . She is flaring      Current tx:  plaquenil     Rheumatologic History:   - Diagnosis/es:  - Positive serologies:  - Infectious screening labs:  - Previous Treatments:  - Current Treatments:     Interval History:   Hospitalization since last office visit: No    Patient Active Problem List    Diagnosis Date Noted    Vitamin D deficiency 2024    Fibromyalgia 2021    Iron deficiency 2021    Sinus tachycardia 2018     Past Surgical History:   Procedure Laterality Date    ADENOIDECTOMY      ankle arthroscopy       SECTION      DILATION AND CURETTAGE OF UTERUS      TONSILLECTOMY       Social History[1]  Family History   Problem Relation Name Age of Onset    Diabetes Mother Amparo Fawn     Hyperlipidemia Mother Amparo Fawn     Miscarriages / Stillbirths Mother Amparo Afwn     Heart disease Father Alex     Hyperlipidemia Father Alex     Diabetes Father Alex     Depression Father Alex     Cancer Maternal Grandmother Chacha     Arthritis Maternal Grandmother Chacha     Miscarriages / Stillbirths Maternal Grandmother Chacha     Cancer Maternal Grandfather Ariel Clemente     Glaucoma Neg Hx      Macular degeneration Neg Hx       Review of patient's allergies indicates:   Allergen Reactions     Flexeril [cyclobenzaprine] Palpitations    Penicillins Hives and Rash       Review of Systems   Review of Systems   Constitutional:  Positive for chills and fatigue. Negative for activity change, appetite change, diaphoresis, fever and unexpected weight change.   HENT:  Negative for congestion, dental problem, ear discharge, ear pain, facial swelling, mouth sores, nosebleeds, postnasal drip, rhinorrhea, sinus pressure, sneezing, sore throat, tinnitus, trouble swallowing and voice change.    Eyes:  Negative for photophobia, pain, discharge, redness and itching.   Respiratory:  Positive for cough. Negative for apnea, chest tightness, shortness of breath and wheezing.    Cardiovascular:  Positive for leg swelling. Negative for chest pain and palpitations.   Gastrointestinal:  Positive for abdominal pain. Negative for abdominal distention, constipation, diarrhea, nausea and vomiting.   Endocrine: Negative for cold intolerance, heat intolerance, polydipsia and polyuria.   Genitourinary:  Negative for decreased urine volume, difficulty urinating, dysuria, flank pain, frequency, hematuria and urgency.   Musculoskeletal:  Positive for arthralgias, back pain, gait problem, joint swelling, myalgias, neck pain and neck stiffness.   Skin:  Negative for pallor, rash and wound.   Allergic/Immunologic: Positive for immunocompromised state.   Neurological:  Negative for dizziness, tremors, numbness and headaches.   Hematological:  Negative for adenopathy. Does not bruise/bleed easily.   Psychiatric/Behavioral:  Negative for sleep disturbance. The patient is not nervous/anxious.         Current Medications:  Current Outpatient Medications   Medication Instructions    azelastine (ASTELIN) 137 mcg, Nasal, 2 times daily    hydroxychloroquine (PLAQUENIL) 200 mg, Daily    levocetirizine (XYZAL) 5 mg, Oral, Nightly    mupirocin (BACTROBAN) 2 % ointment 3 times daily    nitrofurantoin (MACRODANTIN) 100 mg, Oral, Nightly         Objective:  "    Vitals:    07/14/25 1046   BP: 100/62   Pulse: 91   Weight: 52.1 kg (114 lb 13.8 oz)   Height: 5' 2.01" (1.575 m)   PainSc:   4      Body mass index is 21 kg/m².     Physical Examinations:  Physical Exam   Constitutional: She is oriented to person, place, and time.   HENT:   Head: Normocephalic and atraumatic.   Mouth/Throat: Oropharynx is clear and moist.   Eyes: Pupils are equal, round, and reactive to light.   Neck: No thyromegaly present.   Cardiovascular: Normal rate, regular rhythm and normal heart sounds. Exam reveals no gallop and no friction rub.   No murmur heard.  Pulmonary/Chest: Breath sounds normal. She has no wheezes. She has no rales. She exhibits no tenderness.   Abdominal: There is no abdominal tenderness. There is no rebound and no guarding.   Musculoskeletal:         General: Tenderness present. No deformity.      Right shoulder: Tenderness present.      Left shoulder: Tenderness present.      Right elbow: Normal.      Left elbow: Tenderness present.      Right wrist: Tenderness present.      Left wrist: Tenderness present.      Cervical back: Neck supple.      Right knee: Effusion present. Tenderness present.      Left knee: Effusion present. Tenderness present.   Lymphadenopathy:     She has no cervical adenopathy.   Neurological: She is alert and oriented to person, place, and time. Gait normal.   Skin: Rash noted. No erythema. There is pallor.   Psychiatric: Mood, memory, affect and judgment normal.   Vitals reviewed.      Right Side Rheumatological Exam     Examination finds the elbow normal.    The patient is tender to palpation of the shoulder, wrist, knee, 1st PIP, 1st MCP, 2nd PIP, 2nd MCP, 3rd PIP, 3rd MCP, 4th PIP, 4th MCP, 5th PIP and 5th MCP    Shoulder Exam   Tenderness Location: acromion    Range of Motion   Active abduction:  abnormal   Adduction: abnormal  Sensation: normal    Knee Exam   Tenderness Location: medial joint line and LCL  Patellofemoral Crepitus: " positive  Effusion: positive  Sensation: normal    Hip Exam   Tenderness Location: posterior and lateral  Sensation: normal    Elbow/Wrist Exam   Tenderness Location: no tenderness  Sensation: normal    Left Side Rheumatological Exam     The patient is tender to palpation of the shoulder, elbow, wrist, knee, 1st PIP, 1st MCP, 2nd PIP, 2nd MCP, 3rd PIP, 3rd MCP, 4th PIP, 4th MCP, 5th PIP and 5th MCP.    Shoulder Exam   Tenderness Location: acromion    Range of Motion   Active abduction:  abnormal   Sensation: normal    Knee Exam   Tenderness Location: lateral joint line and medial joint line    Patellofemoral Crepitus: positive  Effusion: positive  Sensation: normal    Hip Exam   Tenderness Location: posterior and lateral  Sensation: normal    Elbow/Wrist Exam   Sensation: normal      Back/Neck Exam   Tenderness Right paramedian tenderness of the Upper C-Spine, Upper T-Spine, Upper L-Spine and SI Joint.Left paramedian tenderness of the Upper C-Spine, Upper T-Spine, SI Joint and Upper L-Spine.         Disease Assessment Scores:  Patient's Global Assessment of arthritis (0-10): 1  Physician's Global Assessment of arthritis (0-10): 1  Number of Tender Joints (0-28): 1  Number of Swollen Joints (0-28): 1        3/9/2024     8:14 AM   Rapid3 Question Responses and Scores   MDHAQ Score 0.5   Psychologic Score 4.4   Pain Score 5   When you awakened in the morning OVER THE LAST WEEK, did you feel stiff? Yes   If Yes, please indicate the number of hours until you are as limber as you will be for the day 4   Fatigue Score 4   Global Health Score 5   RAPID3 Score 3.89       Monitoring Lab Results:  Lab Results   Component Value Date    WBC None 05/12/2025    RBC 3.92 (L) 01/13/2025    HGB 12.0 01/13/2025    HCT 37.7 01/13/2025    MCV 96 01/13/2025    MCH 30.6 01/13/2025    MCHC 31.8 (L) 01/13/2025    RDW 12.8 01/13/2025     01/13/2025        Lab Results   Component Value Date     01/13/2025    K 3.5 01/13/2025     " 01/13/2025    CO2 25 01/13/2025    GLU 93 01/13/2025    BUN 11 01/13/2025    CREATININE 0.7 01/13/2025    CALCIUM 9.5 01/13/2025    PROT 7.6 01/13/2025    PROT 7.6 01/13/2025    ALBUMIN 4.3 01/13/2025    ALBUMIN 4.3 01/13/2025    BILITOT 0.2 01/13/2025    BILITOT 0.2 01/13/2025    ALKPHOS 39 (L) 01/13/2025    ALKPHOS 39 (L) 01/13/2025    AST 16 01/13/2025    AST 16 01/13/2025    ALT 11 01/13/2025    ALT 11 01/13/2025    ANIONGAP 7 (L) 01/13/2025    EGFRNORACEVR >60.0 01/13/2025       Lab Results   Component Value Date    SEDRATE 6 01/13/2025    CRP 1.0 06/30/2022        Lab Results   Component Value Date    DDEWDQWX86BX 17 (L) 01/13/2025    YUPLPHNE18 373 04/07/2021        Lab Results   Component Value Date    CHOL 149 08/26/2024    HDL 58 08/26/2024    LDLCALC 71.6 08/26/2024    TRIG 97 08/26/2024       Lab Results   Component Value Date    RF <13.0 06/30/2022    CCPANTIBODIE <0.5 06/30/2022     Lab Results   Component Value Date    ANASCREEN Negative <1:80 01/13/2025    ANATITER 1:80 12/07/2022    DSDNA Negative 1:10 01/13/2025     Lab Results   Component Value Date    HLABB27 Negative 06/30/2022       Infectious Disease Screening:  Lab Results   Component Value Date    HEPBSAG Non-reactive 11/21/2023     No results found for: "HEPCAB", "HEPCVAB", "HCVQUANTRES"  No results found for: "TBGOLDPLUS", "QUANTTBGDPL"  No results found for: "QUANTIFERON", "SVCMT", "QUANTAGVALUE", "QUANTNILVALU", "QUANTMITOGEN", "QFTTBAG", "QINT"     Imaging: DEXA, Xrays, MRIs, CTs, etc    Old & Outside Medical Records:  Reviewed old and all outside medical records available in Care Everywhere     Assessment:     Encounter Diagnoses   Name Primary?    Benign joint hypermobility Yes    Sjogren's syndrome, with unspecified organ involvement     NCGS (non-celiac gluten sensitivity)     Fibromyalgia     Heterozygous MTHFR mutation [id number]     High risk medication use         Plan:      Encounter Diagnoses   Name Primary?    Benign joint " hypermobility Yes    Sjogren's syndrome, with unspecified organ involvement     NCGS (non-celiac gluten sensitivity)     Fibromyalgia     Heterozygous MTHFR mutation [id number]     High risk medication use      Cheryl was seen today for disease management.    Diagnoses and all orders for this visit:    Benign joint hypermobility  -     methylPREDNISolone acetate injection 80 mg  -     ketorolac injection 60 mg  -     cyanocobalamin injection 1,000 mcg  -     Transferrin; Future  -     Iron and TIBC; Future    Sjogren's syndrome, with unspecified organ involvement  -     Sedimentation rate; Future  -     Sjogrens syndrome-B extractable nuclear antibody; Future  -     Sjogrens syndrome-A extractable nuclear antibody; Future  -     Comprehensive Metabolic Panel; Future  -     CBC Auto Differential; Future  -     Anti-Smith Antibody; Future  -     Anti-Histone Antibody; Future  -     MERRY Screen w/Reflex; Future  -     methylPREDNISolone acetate injection 80 mg  -     ketorolac injection 60 mg  -     cyanocobalamin injection 1,000 mcg  -     Transferrin; Future  -     Iron and TIBC; Future  -     Vitamin D; Future    NCGS (non-celiac gluten sensitivity)  -     Sedimentation rate; Future  -     Sjogrens syndrome-B extractable nuclear antibody; Future  -     Sjogrens syndrome-A extractable nuclear antibody; Future  -     Comprehensive Metabolic Panel; Future  -     CBC Auto Differential; Future  -     Anti-Smith Antibody; Future  -     Anti-Histone Antibody; Future  -     MERRY Screen w/Reflex; Future  -     methylPREDNISolone acetate injection 80 mg  -     ketorolac injection 60 mg  -     cyanocobalamin injection 1,000 mcg  -     Transferrin; Future  -     Iron and TIBC; Future  -     Vitamin D; Future    Fibromyalgia  -     Sedimentation rate; Future  -     Sjogrens syndrome-B extractable nuclear antibody; Future  -     Sjogrens syndrome-A extractable nuclear antibody; Future  -     Comprehensive Metabolic Panel; Future  -      CBC Auto Differential; Future  -     Anti-Smith Antibody; Future  -     Anti-Histone Antibody; Future  -     MERRY Screen w/Reflex; Future  -     methylPREDNISolone acetate injection 80 mg  -     ketorolac injection 60 mg  -     cyanocobalamin injection 1,000 mcg  -     Transferrin; Future  -     Iron and TIBC; Future  -     Vitamin D; Future    Heterozygous MTHFR mutation [id number]  -     methylPREDNISolone acetate injection 80 mg  -     ketorolac injection 60 mg  -     cyanocobalamin injection 1,000 mcg  -     Transferrin; Future  -     Iron and TIBC; Future  -     Vitamin D; Future    High risk medication use  -     methylPREDNISolone acetate injection 80 mg  -     ketorolac injection 60 mg  -     cyanocobalamin injection 1,000 mcg  -     Transferrin; Future  -     Iron and TIBC; Future          1. Continue plaquenil  2. Prn prednisone  3. Nurse injection     Follow-up 6 months    More than 50% of the  33. minute encounter was spent face to face counseling the patient regarding current status and future plan of care as well as side effects  of the medications. All questions were answered to patient's satisfaction also includes  non-face to face time preparing to see the patient (eg, review of tests), Obtaining and/or reviewing separately obtained history, Documenting clinical information in the electronic or other health record, Independently interpreting results           [1]   Social History  Tobacco Use    Smoking status: Never    Smokeless tobacco: Never   Substance Use Topics    Alcohol use: Yes     Comment: socially    Drug use: Never

## 2025-07-15 LAB
ANA (OHS): POSITIVE
ANA PATTERN 1 (OHS): ABNORMAL
ANA TITER 1 (OHS): ABNORMAL
SM  ANTIBODY (OHS): 0.11 RATIO
SM INTERPRETATION (OHS): NEGATIVE
SSA  ANTIBODY (OHS): 0.06 RATIO (ref 0–0.99)
SSA INTERPRETATION (OHS): NEGATIVE
SSB  ANTIBODY (OHS): 0.06 RATIO
SSB INTERPRETATION (OHS): NEGATIVE

## 2025-07-16 LAB
DSDNA ANTIBODY (OHS): NORMAL
DSDNA ANTIBODY TITER (OHS): NORMAL

## 2025-07-17 LAB
HISTONE IGG SER IA-ACNC: 0.3 UNITS
SM/RNP ANTIBODY (OHS): 0.08 RATIO
SM/RNP INTERPRETATION (OHS): NEGATIVE

## 2025-07-18 ENCOUNTER — LAB VISIT (OUTPATIENT)
Dept: LAB | Facility: HOSPITAL | Age: 35
End: 2025-07-18
Attending: INTERNAL MEDICINE
Payer: COMMERCIAL

## 2025-07-18 DIAGNOSIS — D50.8 OTHER IRON DEFICIENCY ANEMIA: ICD-10-CM

## 2025-07-18 LAB — FERRITIN SERPL-MCNC: 14 NG/ML (ref 20–300)

## 2025-07-18 PROCEDURE — 36415 COLL VENOUS BLD VENIPUNCTURE: CPT | Mod: PO

## 2025-07-18 PROCEDURE — 82728 ASSAY OF FERRITIN: CPT
